# Patient Record
Sex: FEMALE | Race: BLACK OR AFRICAN AMERICAN | NOT HISPANIC OR LATINO
[De-identification: names, ages, dates, MRNs, and addresses within clinical notes are randomized per-mention and may not be internally consistent; named-entity substitution may affect disease eponyms.]

---

## 2018-01-09 ENCOUNTER — RESULT REVIEW (OUTPATIENT)
Age: 58
End: 2018-01-09

## 2018-06-26 ENCOUNTER — INPATIENT (INPATIENT)
Facility: HOSPITAL | Age: 58
LOS: 1 days | Discharge: HOME | DRG: 392 | End: 2018-06-28
Attending: INTERNAL MEDICINE | Admitting: INTERNAL MEDICINE
Payer: COMMERCIAL

## 2018-06-26 VITALS
DIASTOLIC BLOOD PRESSURE: 67 MMHG | RESPIRATION RATE: 20 BRPM | TEMPERATURE: 98 F | OXYGEN SATURATION: 100 % | SYSTOLIC BLOOD PRESSURE: 130 MMHG | HEART RATE: 78 BPM

## 2018-06-26 DIAGNOSIS — R13.10 DYSPHAGIA, UNSPECIFIED: ICD-10-CM

## 2018-06-26 LAB
ALBUMIN SERPL ELPH-MCNC: 4.9 G/DL — SIGNIFICANT CHANGE UP (ref 3.3–5)
ALP SERPL-CCNC: 58 U/L — SIGNIFICANT CHANGE UP (ref 40–120)
ALT FLD-CCNC: 14 U/L — SIGNIFICANT CHANGE UP (ref 10–45)
ANION GAP SERPL CALC-SCNC: 15 MMOL/L — SIGNIFICANT CHANGE UP (ref 5–17)
AST SERPL-CCNC: 12 U/L — SIGNIFICANT CHANGE UP (ref 10–40)
BASOPHILS # BLD AUTO: 0 K/UL — SIGNIFICANT CHANGE UP (ref 0–0.2)
BASOPHILS NFR BLD AUTO: 0.6 % — SIGNIFICANT CHANGE UP (ref 0–2)
BILIRUB SERPL-MCNC: 0.6 MG/DL — SIGNIFICANT CHANGE UP (ref 0.2–1.2)
BUN SERPL-MCNC: 11 MG/DL — SIGNIFICANT CHANGE UP (ref 7–23)
CALCIUM SERPL-MCNC: 10 MG/DL — SIGNIFICANT CHANGE UP (ref 8.4–10.5)
CHLORIDE SERPL-SCNC: 99 MMOL/L — SIGNIFICANT CHANGE UP (ref 96–108)
CO2 SERPL-SCNC: 26 MMOL/L — SIGNIFICANT CHANGE UP (ref 22–31)
CREAT SERPL-MCNC: 0.76 MG/DL — SIGNIFICANT CHANGE UP (ref 0.5–1.3)
EOSINOPHIL # BLD AUTO: 0 K/UL — SIGNIFICANT CHANGE UP (ref 0–0.5)
EOSINOPHIL NFR BLD AUTO: 0.5 % — SIGNIFICANT CHANGE UP (ref 0–6)
GLUCOSE SERPL-MCNC: 91 MG/DL — SIGNIFICANT CHANGE UP (ref 70–99)
HCT VFR BLD CALC: 44.9 % — SIGNIFICANT CHANGE UP (ref 34.5–45)
HGB BLD-MCNC: 15.6 G/DL — HIGH (ref 11.5–15.5)
LYMPHOCYTES # BLD AUTO: 1.6 K/UL — SIGNIFICANT CHANGE UP (ref 1–3.3)
LYMPHOCYTES # BLD AUTO: 27.9 % — SIGNIFICANT CHANGE UP (ref 13–44)
MCHC RBC-ENTMCNC: 31.2 PG — SIGNIFICANT CHANGE UP (ref 27–34)
MCHC RBC-ENTMCNC: 34.7 GM/DL — SIGNIFICANT CHANGE UP (ref 32–36)
MCV RBC AUTO: 90 FL — SIGNIFICANT CHANGE UP (ref 80–100)
MONOCYTES # BLD AUTO: 0.4 K/UL — SIGNIFICANT CHANGE UP (ref 0–0.9)
MONOCYTES NFR BLD AUTO: 6.2 % — SIGNIFICANT CHANGE UP (ref 2–14)
NEUTROPHILS # BLD AUTO: 3.8 K/UL — SIGNIFICANT CHANGE UP (ref 1.8–7.4)
NEUTROPHILS NFR BLD AUTO: 64.9 % — SIGNIFICANT CHANGE UP (ref 43–77)
PLATELET # BLD AUTO: 283 K/UL — SIGNIFICANT CHANGE UP (ref 150–400)
POTASSIUM SERPL-MCNC: 4.1 MMOL/L — SIGNIFICANT CHANGE UP (ref 3.5–5.3)
POTASSIUM SERPL-SCNC: 4.1 MMOL/L — SIGNIFICANT CHANGE UP (ref 3.5–5.3)
PROT SERPL-MCNC: 8 G/DL — SIGNIFICANT CHANGE UP (ref 6–8.3)
RBC # BLD: 4.99 M/UL — SIGNIFICANT CHANGE UP (ref 3.8–5.2)
RBC # FLD: 12 % — SIGNIFICANT CHANGE UP (ref 10.3–14.5)
SODIUM SERPL-SCNC: 140 MMOL/L — SIGNIFICANT CHANGE UP (ref 135–145)
WBC # BLD: 5.8 K/UL — SIGNIFICANT CHANGE UP (ref 3.8–10.5)
WBC # FLD AUTO: 5.8 K/UL — SIGNIFICANT CHANGE UP (ref 3.8–10.5)

## 2018-06-26 PROCEDURE — 99285 EMERGENCY DEPT VISIT HI MDM: CPT

## 2018-06-26 PROCEDURE — 74177 CT ABD & PELVIS W/CONTRAST: CPT | Mod: 26

## 2018-06-26 RX ORDER — SODIUM CHLORIDE 9 MG/ML
1000 INJECTION INTRAMUSCULAR; INTRAVENOUS; SUBCUTANEOUS ONCE
Qty: 0 | Refills: 0 | Status: COMPLETED | OUTPATIENT
Start: 2018-06-26 | End: 2018-06-26

## 2018-06-26 RX ORDER — PANTOPRAZOLE SODIUM 20 MG/1
40 TABLET, DELAYED RELEASE ORAL
Qty: 0 | Refills: 0 | Status: DISCONTINUED | OUTPATIENT
Start: 2018-06-26 | End: 2018-06-28

## 2018-06-26 RX ORDER — AMLODIPINE BESYLATE 2.5 MG/1
5 TABLET ORAL DAILY
Qty: 0 | Refills: 0 | Status: DISCONTINUED | OUTPATIENT
Start: 2018-06-26 | End: 2018-06-28

## 2018-06-26 RX ORDER — FAMOTIDINE 10 MG/ML
20 INJECTION INTRAVENOUS ONCE
Qty: 0 | Refills: 0 | Status: COMPLETED | OUTPATIENT
Start: 2018-06-26 | End: 2018-06-26

## 2018-06-26 RX ORDER — LATANOPROST 0.05 MG/ML
1 SOLUTION/ DROPS OPHTHALMIC; TOPICAL AT BEDTIME
Qty: 0 | Refills: 0 | Status: DISCONTINUED | OUTPATIENT
Start: 2018-06-26 | End: 2018-06-28

## 2018-06-26 RX ORDER — INFLUENZA VIRUS VACCINE 15; 15; 15; 15 UG/.5ML; UG/.5ML; UG/.5ML; UG/.5ML
0.5 SUSPENSION INTRAMUSCULAR ONCE
Qty: 0 | Refills: 0 | Status: COMPLETED | OUTPATIENT
Start: 2018-06-26 | End: 2018-06-26

## 2018-06-26 RX ORDER — VALSARTAN 80 MG/1
160 TABLET ORAL DAILY
Qty: 0 | Refills: 0 | Status: DISCONTINUED | OUTPATIENT
Start: 2018-06-26 | End: 2018-06-28

## 2018-06-26 RX ORDER — SODIUM CHLORIDE 9 MG/ML
1000 INJECTION INTRAMUSCULAR; INTRAVENOUS; SUBCUTANEOUS
Qty: 0 | Refills: 0 | Status: DISCONTINUED | OUTPATIENT
Start: 2018-06-26 | End: 2018-06-28

## 2018-06-26 RX ORDER — HEPARIN SODIUM 5000 [USP'U]/ML
5000 INJECTION INTRAVENOUS; SUBCUTANEOUS EVERY 12 HOURS
Qty: 0 | Refills: 0 | Status: DISCONTINUED | OUTPATIENT
Start: 2018-06-26 | End: 2018-06-28

## 2018-06-26 RX ADMIN — LATANOPROST 1 DROP(S): 0.05 SOLUTION/ DROPS OPHTHALMIC; TOPICAL at 23:46

## 2018-06-26 RX ADMIN — SODIUM CHLORIDE 60 MILLILITER(S): 9 INJECTION INTRAMUSCULAR; INTRAVENOUS; SUBCUTANEOUS at 23:47

## 2018-06-26 RX ADMIN — SODIUM CHLORIDE 1000 MILLILITER(S): 9 INJECTION INTRAMUSCULAR; INTRAVENOUS; SUBCUTANEOUS at 15:03

## 2018-06-26 RX ADMIN — FAMOTIDINE 20 MILLIGRAM(S): 10 INJECTION INTRAVENOUS at 15:03

## 2018-06-26 NOTE — ED PROVIDER NOTE - MEDICAL DECISION MAKING DETAILS
ATTG: : abd / epigastric pain concern for stenosis / GERD / obstruction / will check labs, check US, check lipase, check CT a/p, ivf, pain medication, re eval for dispo

## 2018-06-26 NOTE — CONSULT NOTE ADULT - ASSESSMENT
57 year old female with PMH GERD, HTN Glaucoma presents to ER with 3-4 days history of dysphagia (solids and liquids) and episodes of near syncope - appears clinically dehydrated      PLAN  -follow up ER labs  -Admit to Medicine (discussed with PMD office, admit to Dr Ngo)  -IV fluid, clears PO as tolerated for now (no carbonated beverages)  -Zofran prn  -IV PPI  -esophagram r/o peptic stricture  -possible EGD +/- dilation pending results of esophagram      Discussed with pt and family at bedside  Discussed with ER team    Thank you for the courtesy of this consult.  Shane Shahid PA-C    [facility]  [phone] 57 year old female with PMH GERD, HTN Glaucoma presents to ER with 3-4 days history of dysphagia (solids and liquids) and episodes of near syncope - appears clinically dehydrated    PLAN  -Follow up ER labs  -Admit to Medicine (discussed with PMD office, admit to Dr Ngo)  -IV fluid, clears PO as tolerated for now (no carbonated beverages)  -Zofran prn  -IV PPI  -Esophagram r/o peptic stricture  -Possible EGD +/- dilation pending results of esophagram    Discussed with pt and family at bedside  Discussed with ER team    Thank you for the courtesy of this consult.  Shane Shahid PA-C    [facility]  [phone]

## 2018-06-26 NOTE — ED PROVIDER NOTE - ATTENDING CONTRIBUTION TO CARE
56 y/o f with pmhx HTN, GERD, Esophagitis, Gastritis, fibroids s/p fibroidectomy presents for pain in middle of chest which starts after eating. started on Friday and then got worse on sat. Last BM was on Sat. no vomiting. no diarrhea. able to tolerate small amounts of food. no fever. no chills. no cough. no URI symptoms. patient was seen earlier at the GI MD office and sent to ED for further evaluation. states she started feeling weak on Friday after work. did not take any meds for this. pain is desribed as sharp, non radiating.   Gen.  jovanna cute distress  HEENT:  PERRL EOMI   Lungs:  ctab/l  CVS: S1S2   Abd;  soft, no guarding no distention, + tend to palp llq and epigastric area. no referred tend.  Ext: no edema  Neuro: aaox3 no focal deficits.  MSK: 5/5 x 4 ext

## 2018-06-26 NOTE — CONSULT NOTE ADULT - SUBJECTIVE AND OBJECTIVE BOX
Patient is a 57y old  Female who presents with a chief complaint of dysphagia    HPI:  56 y/o female presents to ED from our office c/o difficulty swallowing and epigastric pain/dyspepsia. Patient reports decreased ability to swallow and decreased PO intake. Patient reports since Saturday she has had difficulty eating and has had several episodes of near syncope. Patient reports nausea, dizziness. Patient reports long history of reflux; she only takes prn PPI for GERD, last EGD 2016.  Patient denies SOB, CP, fever/chills, recent illness/travel, falls/LOC.    She reports was able to eat normally on Friday and developed acute dysphagia with sensation of food feeling "stuck" in mid chest area on Saturday and associated nausea.  Also reports difficulty with PO liquids - had been able to take PO Indonesian and popsicles yesterday. tried PO ginger  ale and felt lightheaded and had more dyspepsia with nausea.  She is able to manage secretions there is no vomiting.  small firm BM yesterday.    no recent NSAIDs or steroids    PMD [facility]  Cardiologist Stiven - recent angio this year at [facility], no intervention    PAST MEDICAL & SURGICAL HISTORY:  Hypertension  Glaucoma  No significant past surgical history      Allergies  No Known Allergies      MEDICATIONS  (STANDING):    MEDICATIONS  (PRN):      Social History:    Denies tobacco or ETOH use    Family History   IBD (  ) Yes   (X  ) No  GI Malignancy (  )  Yes    ( X ) No        Advanced Directives: ( X    ) None    (      ) DNR    (     ) DNI    (     ) Health Care Proxy:     Review of Systems:    General:  No wt loss, fevers, chills, night sweats,   CV:  No pain, palpitations, +near syncope  Resp:  No dyspnea, cough, tachypnea, wheezing  GI:  see HPI  :  No pain, bleeding, incontinence, nocturia  Muscle:  No pain, weakness  Neuro:  No focal weakness, tingling, memory problems +near syncope  Psych:  No fatigue, insomnia, mood problems, depression  Endocrine:  No polyuria, polydypsia, cold/heat intolerance  Heme:  No petechiae, ecchymosis, easy bruisability  Skin:  No rash, tattoos, scars, edema      Vital Signs Last 24 Hrs  T(C): 36.7 (26 Jun 2018 13:24), Max: 36.7 (26 Jun 2018 13:24)  T(F): 98.1 (26 Jun 2018 13:24), Max: 98.1 (26 Jun 2018 13:24)  HR: 78 (26 Jun 2018 13:24) (78 - 78)  BP: 130/67 (26 Jun 2018 13:24) (130/67 - 130/67)  BP(mean): --  RR: 20 (26 Jun 2018 13:24) (20 - 20)  SpO2: 100% (26 Jun 2018 13:24) (100% - 100%)    PHYSICAL EXAM:    Constitutional: NAD, well-developed AA female, mother at bedside. Not drooling, no stridor. conversant and appears comfortable  Skin: decreased turgor, anicteric  Neck: No LAD, supple  no retractions, trachea midline  Respiratory: CTA b/l  Cardiovascular: S1 and S2, RRR, no Murmur  Gastrointestinal: BS+, soft, NT/ND, neg HSM,  Extremities: No peripheral edema, neg clubbing, cyanosis  Vascular: 2+ peripheral pulses  Neurological: A/O x 3, no focal deficits  Psychiatric: Normal mood, normal affect      LABS:                  RADIOLOGY & ADDITIONAL TESTS: Patient is a 57y old  Female who presents with a chief complaint of dysphagia    HPI:  56 y/o female presents to ED from our office c/o difficulty swallowing and epigastric pain/dyspepsia. Patient reports decreased ability to swallow and decreased PO intake. Patient reports since Saturday she has had difficulty eating and has had several episodes of near syncope. Patient reports nausea, dizziness. Patient reports long history of reflux; she only takes prn PPI for GERD, last EGD 2016.  Patient denies SOB, CP, fever/chills, recent illness/travel, falls/LOC.    She reports was able to eat normally on Friday and developed acute dysphagia with sensation of food feeling "stuck" in mid chest area on Saturday and associated nausea.  Also reports difficulty with PO liquids - had been able to take PO Luxembourgish and popsicles yesterday. tried PO ginger  ale and felt lightheaded and had more dyspepsia with nausea.  She is able to manage secretions there is no vomiting.  small firm BM yesterday.    no recent NSAIDs or steroids    PMD [facility]  Cardiologist [place] - recent angio this year at [facility], no intervention    PAST MEDICAL & SURGICAL HISTORY:  Hypertension  Glaucoma  No significant past surgical history    Allergies  No Known Allergies    MEDICATIONS  (STANDING):    MEDICATIONS  (PRN):    Social History:  Denies tobacco or ETOH use    Family History   IBD (  ) Yes   (X  ) No  GI Malignancy (  )  Yes    ( X ) No    Advanced Directives: ( X    ) None    (      ) DNR    (     ) DNI    (     ) Health Care Proxy:     Review of Systems:  General:  No wt loss, fevers, chills, night sweats,   CV:  No pain, palpitations, +near syncope  Resp:  No dyspnea, cough, tachypnea, wheezing  GI:  see HPI  :  No pain, bleeding, incontinence, nocturia  Muscle:  No pain, weakness  Neuro:  No focal weakness, tingling, memory problems +near syncope  Psych:  No fatigue, insomnia, mood problems, depression  Endocrine:  No polyuria, polydypsia, cold/heat intolerance  Heme:  No petechiae, ecchymosis, easy bruisability  Skin:  No rash, tattoos, scars, edema    Vital Signs Last 24 Hrs  T(C): 36.7 (26 Jun 2018 13:24), Max: 36.7 (26 Jun 2018 13:24)  T(F): 98.1 (26 Jun 2018 13:24), Max: 98.1 (26 Jun 2018 13:24)  HR: 78 (26 Jun 2018 13:24) (78 - 78)  BP: 130/67 (26 Jun 2018 13:24) (130/67 - 130/67)  BP(mean): --  RR: 20 (26 Jun 2018 13:24) (20 - 20)  SpO2: 100% (26 Jun 2018 13:24) (100% - 100%)    PHYSICAL EXAM:  Constitutional: NAD, well-developed AA female, mother at bedside. Not drooling, no stridor. conversant and appears comfortable  Skin: decreased turgor, anicteric  Neck: No LAD, supple  no retractions, trachea midline  Respiratory: CTA b/l  Cardiovascular: S1 and S2, RRR, no Murmur  Gastrointestinal: BS+, soft, NT/ND, neg HSM,  Extremities: No peripheral edema, neg clubbing, cyanosis  Vascular: 2+ peripheral pulses  Neurological: A/O x 3, no focal deficits  Psychiatric: Normal mood, normal affect    LABS:    RADIOLOGY & ADDITIONAL TESTS:

## 2018-06-26 NOTE — H&P ADULT - HISTORY OF PRESENT ILLNESS
58 y/o female presents to ED from gi office  c/o difficulty swallowing and epigastric pain/dyspepsia. Patient reports decreased ability to swallow and decreased PO intake. Patient reports since Saturday she has had difficulty eating   . Patient reports nausea, dizziness. Patient reports long history of reflux; she only takes prn PPI for GERD, last EGD 2016.  Patient denies SOB, CP, fever/chills, recent illness/travel, falls/LOC.    She reports was able to eat normally on Friday and developed acute dysphagia with sensation of food feeling "stuck" in mid chest area on Saturday and associated nausea.  Also reports difficulty with PO liquids -

## 2018-06-26 NOTE — ED PROVIDER NOTE - OBJECTIVE STATEMENT
58yo female history of HTN, fibroids, GERD, esophagitis, gastritis pw difficulty swallowing from GI office. Pt. reports difficulty eating and intermittent lightheadedness. Denies cp, sob, fevers, trauma. Pt. rpeorts that she felt that "something was stuck" when eating on Saturday. Pt. able to take PO yesterday, but reports persistent sensation. GI requesting admission for workup.

## 2018-06-26 NOTE — H&P ADULT - NSHPLABSRESULTS_GEN_ALL_CORE
15.6   5.8   )-----------( 283      ( 26 Jun 2018 14:51 )             44.9       06-26    140  |  99  |  11  ----------------------------<  91  4.1   |  26  |  0.76    Ca    10.0      26 Jun 2018 14:51    TPro  8.0  /  Alb  4.9  /  TBili  0.6  /  DBili  x   /  AST  12  /  ALT  14  /  AlkPhos  58  06-26                      Lactate Trend            CAPILLARY BLOOD GLUCOSE

## 2018-06-26 NOTE — ED PROVIDER NOTE - PHYSICAL EXAMINATION
Gen.  jovanna cute distress  HEENT:  PERRL EOMI   Lungs:  ctab/l  CVS: S1S2   Abd;  soft, no guarding no distention, + tend to palp llq and epigastric area. no referred tend.  Ext: no edema  Neuro: aaox3 no focal deficits.  MSK: 5/5 x 4 ext

## 2018-06-26 NOTE — PATIENT PROFILE ADULT. - REASON FOR ADMISSION
c/o difficulty swallowing and epigastric pain/dyspepsia. Patient reports decreased ability to swallow and decreased PO intake.  unconsciousness

## 2018-06-26 NOTE — H&P ADULT - ASSESSMENT
pt w/ dysphagia/epigastric discomfort  r/o stricture  cta/p  egd as per gi  iv fluids  clear  ppis  htn con t  meds

## 2018-06-26 NOTE — ED ADULT NURSE NOTE - OBJECTIVE STATEMENT
58 y/o female presents to ED from home c/o difficulty swallowing and epigastric pain. Patient reports decreased ability to swallow and decreased PO intake. Patient reports for the past week she has had difficulty eating and has had several episodes of near syncope. Patient reports nausea, dizziness. Patient reports long history of reflux; does not take medications.  Patient denies SOB, CP, fever/chills, recent illness/travel, falls/LOC. Patient A&Ox4, breathing spontaneously, airway patent, b/l clear lungs, abdomen tender to palpation, +pulses, cap refill <2 seconds. patient resting in bed, side rails up, plan of care explained. 56 y/o female presents to ED from home c/o difficulty swallowing and epigastric pain. Patient reports decreased ability to swallow and decreased PO intake. Patient reports since Friday she has had difficulty eating and has had several episodes of near syncope. Patient reports nausea, dizziness. Patient reports long history of reflux; does not take medications.  Patient denies SOB, CP, fever/chills, recent illness/travel, falls/LOC. Patient A&Ox4, breathing spontaneously, airway patent, b/l clear lungs, abdomen tender to palpation, +pulses, cap refill <2 seconds. patient resting in bed, side rails up, plan of care explained.

## 2018-06-27 ENCOUNTER — TRANSCRIPTION ENCOUNTER (OUTPATIENT)
Age: 58
End: 2018-06-27

## 2018-06-27 PROCEDURE — 74220 X-RAY XM ESOPHAGUS 1CNTRST: CPT | Mod: 26

## 2018-06-27 RX ORDER — ATORVASTATIN CALCIUM 80 MG/1
10 TABLET, FILM COATED ORAL AT BEDTIME
Qty: 0 | Refills: 0 | Status: DISCONTINUED | OUTPATIENT
Start: 2018-06-27 | End: 2018-06-28

## 2018-06-27 RX ADMIN — PANTOPRAZOLE SODIUM 40 MILLIGRAM(S): 20 TABLET, DELAYED RELEASE ORAL at 06:44

## 2018-06-27 RX ADMIN — SODIUM CHLORIDE 60 MILLILITER(S): 9 INJECTION INTRAMUSCULAR; INTRAVENOUS; SUBCUTANEOUS at 21:02

## 2018-06-27 RX ADMIN — ATORVASTATIN CALCIUM 10 MILLIGRAM(S): 80 TABLET, FILM COATED ORAL at 21:06

## 2018-06-27 RX ADMIN — AMLODIPINE BESYLATE 5 MILLIGRAM(S): 2.5 TABLET ORAL at 06:44

## 2018-06-27 RX ADMIN — HEPARIN SODIUM 5000 UNIT(S): 5000 INJECTION INTRAVENOUS; SUBCUTANEOUS at 06:44

## 2018-06-27 RX ADMIN — LATANOPROST 1 DROP(S): 0.05 SOLUTION/ DROPS OPHTHALMIC; TOPICAL at 21:04

## 2018-06-27 RX ADMIN — VALSARTAN 160 MILLIGRAM(S): 80 TABLET ORAL at 07:57

## 2018-06-27 RX ADMIN — SODIUM CHLORIDE 60 MILLILITER(S): 9 INJECTION INTRAMUSCULAR; INTRAVENOUS; SUBCUTANEOUS at 06:45

## 2018-06-27 NOTE — DISCHARGE NOTE ADULT - HOSPITAL COURSE
56 y/o female presents to ED from our office c/o difficulty swallowing and epigastric pain/dyspepsia. Patient reports decreased ability to swallow and decreased PO intake. Patient reports since Saturday she has had difficulty eating and has had several episodes of near syncope. Patient reports nausea, dizziness. Patient reports long history of reflux; she only takes prn PPI for GERD, last EGD 2016. Esophagram showed Tertiary contractions of the esophagus. EGD with dilation performed .  EGD showed mild gastritis, normal duodenum. Pt can resume regular diet. Start Trial of Reglan 10 mg PO QID; 30 min AC and HS. Await pathology results. Follow-up outpatient with Gastroenerologist Dr. Nascimento in 2 weeks.   Pt feels improved but is concerned with adverse side effects of tardive dyskinesia with  Reglan. 56 y/o female presents to ED from our office c/o difficulty swallowing and epigastric pain/dyspepsia. Patient reports decreased ability to swallow and decreased PO intake. Patient reports since Saturday she has had difficulty eating and has had several episodes of near syncope. Patient reports nausea, dizziness. Patient reports long history of reflux; she only takes prn PPI for GERD, last EGD 2016. Esophagram showed Tertiary contractions of the esophagus. EGD with dilation performed .  EGD showed mild gastritis, normal duodenum. Pt can resume regular diet. Start Trial of Reglan 10 mg PO QID; 30 min AC and HS. Await pathology results. Pt feels improved but is concerned with adverse side effects of tardive dyskinesia with  Reglan. Pt will follow-up outpatient with Dr. Nascimento who will arrange for alternative medication to help with dysphagia.  Pt is cleared by gastroenterologist for discharge.

## 2018-06-27 NOTE — DISCHARGE NOTE ADULT - PATIENT PORTAL LINK FT
You can access the [facility] Patient Portal, offered by [facility], by registering with the following website: [email or web address]

## 2018-06-27 NOTE — DISCHARGE NOTE ADULT - PLAN OF CARE
Improved Upper endoscopy showed mild gastritis.   Continue with medication as prescribed by your doctor.  Follow-up with Gastroenterologist Dr. Nascimento in 2 weeks. Call for appointment. Low salt diet  Activity as tolerated.  Take all medication as prescribed.  Follow up with your medical doctor for routine blood pressure monitoring at your next visit.  Notify your doctor if you have any of the following symptoms:   Dizziness, Lightheadedness, Blurry vision, Headache, Chest pain, Shortness of breath

## 2018-06-27 NOTE — PROGRESS NOTE ADULT - SUBJECTIVE AND OBJECTIVE BOX
CHIEF COMPLAINT:Patient is a 57y old  Female who presents with a chief complaint of c/o difficulty swallowing and epigastric pain/dyspepsia. Patient reports decreased ability to swallow and decreased PO intake.  unconsciousness (26 Jun 2018 21:48)    	        PAST MEDICAL & SURGICAL HISTORY:  Hypertension  No significant past surgical history          REVIEW OF SYSTEMS:  CONSTITUTIONAL: No fever, weight loss, or fatigue  EYES: No eye pain, visual disturbances, or discharge  NECK: No pain or stiffness  RESPIRATORY: No cough, wheezing, chills or hemoptysis; No Shortness of Breath  CARDIOVASCULAR: No chest pain, palpitations, passing out, dizziness, or leg swelling  GASTROINTESTINAL:abd discomfort  GENITOURINARY: No dysuria, frequency, hematuria, or incontinence  NEUROLOGICAL: No headaches, memory loss, loss of strength, numbness, or tremors  SKIN: No itching, burning, rashes, or lesions   LYMPH Nodes: No enlarged glands  ENDOCRINE: No heat or cold intolerance; No hair loss  MUSCULOSKELETAL: No joint pain or swelling; No muscle, back, or extremity pain    Medications:  MEDICATIONS  (STANDING):  amLODIPine   Tablet 5 milliGRAM(s) Oral daily  heparin  Injectable 5000 Unit(s) SubCutaneous every 12 hours  latanoprost 0.005% Ophthalmic Solution 1 Drop(s) Both EYES at bedtime  pantoprazole    Tablet 40 milliGRAM(s) Oral before breakfast  sodium chloride 0.9%. 1000 milliLiter(s) (60 mL/Hr) IV Continuous <Continuous>  valsartan 160 milliGRAM(s) Oral daily    MEDICATIONS  (PRN):    	    PHYSICAL EXAM:  T(C): 36.7 (06-27-18 @ 03:33), Max: 36.7 (06-26-18 @ 13:24)  HR: 68 (06-27-18 @ 03:33) (53 - 78)  BP: 112/60 (06-27-18 @ 03:33) (112/60 - 130/67)  RR: 16 (06-27-18 @ 03:33) (14 - 20)  SpO2: 98% (06-27-18 @ 03:33) (98% - 100%)  Wt(kg): --  I&O's Summary      Appearance: Normal	  HEENT:   Normal oral mucosa, PERRL, EOMI	  Lymphatic: No lymphadenopathy  Cardiovascular: Normal S1 S2, No JVD, No murmurs, No edema  Respiratory: Lungs clear to auscultation	  Psychiatry: A & O x 3, Mood & affect appropriate  Gastrointestinal:  Soft, tender mid epigastric no r/  Skin: No rashes, No ecchymoses, No cyanosis	  Neurologic: Non-focal  Extremities: Normal range of motion, No clubbing, cyanosis or edema  Vascular: Peripheral pulses palpable 2+ bilaterally    TELEMETRY: 	    ECG:  	  RADIOLOGY:  OTHER: 	  	  LABS:	 	    CARDIAC MARKERS:                                15.6   5.8   )-----------( 283      ( 26 Jun 2018 14:51 )             44.9     06-26    140  |  99  |  11  ----------------------------<  91  4.1   |  26  |  0.76    Ca    10.0      26 Jun 2018 14:51    TPro  8.0  /  Alb  4.9  /  TBili  0.6  /  DBili  x   /  AST  12  /  ALT  14  /  AlkPhos  58  06-26    proBNP:   Lipid Profile:   HgA1c:   TSH:

## 2018-06-27 NOTE — DISCHARGE NOTE ADULT - MEDICATION SUMMARY - MEDICATIONS TO TAKE
I will START or STAY ON the medications listed below when I get home from the hospital:    Crestor 5 mg oral tablet  -- 1 tab(s) by mouth once a day (at bedtime)  -- Indication: For High cholesterol    Exforge 5 mg-160 mg oral tablet  -- 1 tab(s) by mouth once a day  -- Indication: For High blood pressure    latanoprost 0.005% ophthalmic solution  -- 1 drop(s) to both eyes once a day (in the evening)  -- Indication: For protect from increased intra-ocular pressure    AcipHex 20 mg oral delayed release tablet  -- 1 tab(s) by mouth once a day  -- Indication: For mild gastritis, dysphagia I will START or STAY ON the medications listed below when I get home from the hospital:    Crestor 5 mg oral tablet  -- 1 tab(s) by mouth once a day (at bedtime)  -- Indication: For High cholesterol    Exforge 5 mg-160 mg oral tablet  -- 1 tab(s) by mouth once a day  -- Indication: For High blood pressure    latanoprost 0.005% ophthalmic solution  -- 1 drop(s) to both eyes once a day (in the evening)  -- Indication: For protect from increased intra-ocular pressure    pantoprazole 40 mg oral delayed release tablet  -- 1 tab(s) by mouth once a day   -- It is very important that you take or use this exactly as directed.  Do not skip doses or discontinue unless directed by your doctor.  Obtain medical advice before taking any non-prescription drugs as some may affect the action of this medication.  Swallow whole.  Do not crush.    -- Indication: For Protect from indigestion, mild gastritis    AcipHex 20 mg oral delayed release tablet  -- 1 tab(s) by mouth once a day  -- Indication: For mild gastritis, dysphagia I will START or STAY ON the medications listed below when I get home from the hospital:    Crestor 5 mg oral tablet  -- 1 tab(s) by mouth once a day (at bedtime)  -- Indication: For High cholesterol    Exforge 5 mg-160 mg oral tablet  -- 1 tab(s) by mouth once a day  -- Indication: For High blood pressure    latanoprost 0.005% ophthalmic solution  -- 1 drop(s) to both eyes once a day (in the evening)  -- Indication: For protect from increased intra-ocular pressure    pantoprazole 40 mg oral delayed release tablet  -- 1 tab(s) by mouth once a day   -- It is very important that you take or use this exactly as directed.  Do not skip doses or discontinue unless directed by your doctor.  Obtain medical advice before taking any non-prescription drugs as some may affect the action of this medication.  Swallow whole.  Do not crush.    -- Indication: For Protect from indigestion, mild gastritis

## 2018-06-27 NOTE — DISCHARGE NOTE ADULT - ADDITIONAL INSTRUCTIONS
Follow-up with your primary care physician within 1 week.   Follow-up with Gastroenterologist,  Dr. Nascimento in 2 weeks. Call for appointment.

## 2018-06-27 NOTE — DISCHARGE NOTE ADULT - MEDICATION SUMMARY - MEDICATIONS TO STOP TAKING
I will STOP taking the medications listed below when I get home from the hospital:  None I will STOP taking the medications listed below when I get home from the hospital:    AcipHex 20 mg oral delayed release tablet  -- 1 tab(s) by mouth once a day

## 2018-06-27 NOTE — PROGRESS NOTE ADULT - SUBJECTIVE AND OBJECTIVE BOX
Patient is a 57y old  Female who presented with a chief complaint of c/o difficulty swallowing and epigastric pain/dyspepsia. Patient reports decreased ability to swallow and decreased PO intake.  unconsciousness (27 Jun 2018 09:54)      INTERVAL HPI/OVERNIGHT EVENTS:  tolerating clears  no vomiting  awaiting esophagram    MEDICATIONS  (STANDING):  amLODIPine   Tablet 5 milliGRAM(s) Oral daily  heparin  Injectable 5000 Unit(s) SubCutaneous every 12 hours  latanoprost 0.005% Ophthalmic Solution 1 Drop(s) Both EYES at bedtime  pantoprazole    Tablet 40 milliGRAM(s) Oral before breakfast  sodium chloride 0.9%. 1000 milliLiter(s) (60 mL/Hr) IV Continuous <Continuous>  valsartan 160 milliGRAM(s) Oral daily      Allergies  No Known Allergies    Review of Systems:  General:  No wt loss, fevers, chills, night sweats   CV:  No pain, palpitations  Resp:  No dyspnea, cough, tachypnea, wheezing  :  No pain, bleeding, incontinence, nocturia  Muscle:  No pain, weakness  Neuro:  No focal weakness, tingling, memory problems   Heme:  No petechiae, ecchymosis, easy bruisability  Skin:  No rash, tattoos, scars, edema    Vital Signs Last 24 Hrs  T(C): 36.7 (27 Jun 2018 03:33), Max: 36.7 (26 Jun 2018 13:24)  T(F): 98.1 (27 Jun 2018 03:33), Max: 98.1 (26 Jun 2018 13:24)  HR: 68 (27 Jun 2018 03:33) (53 - 78)  BP: 112/60 (27 Jun 2018 03:33) (112/60 - 130/67)  BP(mean): --  RR: 16 (27 Jun 2018 03:33) (14 - 20)  SpO2: 98% (27 Jun 2018 03:33) (98% - 100%)    PHYSICAL EXAM:  Constitutional: NAD, well-developed AA female Not drooling, no stridor. conversant and appears comfortable  Skin: improved turgor, anicteric  Neck: No LAD, supple  no retractions, trachea midline  Respiratory: CTA b/l  Cardiovascular: S1 and S2, RRR, no Murmur  Gastrointestinal: BS+, soft, NT/ND, neg HSM,  Extremities: No peripheral edema, neg clubbing, cyanosis  Vascular: 2+ peripheral pulses  Neurological: A/O x 3, no focal deficits  Psychiatric: Normal mood, normal affect    LABS:                        15.6   5.8   )-----------( 283      ( 26 Jun 2018 14:51 )             44.9     06-26    140  |  99  |  11  ----------------------------<  91  4.1   |  26  |  0.76    Ca    10.0      26 Jun 2018 14:51    TPro  8.0  /  Alb  4.9  /  TBili  0.6  /  DBili  x   /  AST  12  /  ALT  14  /  AlkPhos  58  06-26      RADIOLOGY & ADDITIONAL TESTS:  < from: CT Abdomen and Pelvis w/ Oral Cont and w/ IV Cont (06.26.18 @ 16:23) >  PROCEDURE:   CT of the Abdomen and Pelvis was performed with intravenous contrast.   Intravenous contrast: 90 ml Omnipaque 350. 10 ml discarded.  Oral contrast: positive contrast was administered.  Sagittal and coronal reformats were performed.    FINDINGS:    LOWER CHEST: Within normal limits.    LIVER:Within normal limits.  BILE DUCTS: Normal caliber.  GALLBLADDER: Within normal limits.  SPLEEN: Within normal limits.  PANCREAS: Within normal limits.  ADRENALS: Within normal limits.  KIDNEYS/URETERS: 3 mm left-sided nonobstructing stone.    BLADDER: Within normal limits.  REPRODUCTIVE ORGANS: The uterus is markedly enlarged measuring 14.0x 7.3   cm with multiple calcified and degenerating uterine fibroids. The adnexa   are otherwise within normal limits.    BOWEL: No bowel obstruction. Scattered colonic diverticulosis without   evidence of diverticulitis. Appendix is normal.  PERITONEUM: No ascites.  VESSELS:  Within normal limits.  RETROPERITONEUM: No lymphadenopathy.    ABDOMINAL WALL: Within normal limits.  BONES: Degenerative changes ofthe spine.    IMPRESSION:   No acute intra-abdominal pathology.    The uterus is markedly enlarged measuring 14.0x 7.3 cm with multiple   calcified and degenerating uterine fibroids.       < end of copied text > Patient is a 57y old  Female who presented with a chief complaint of c/o difficulty swallowing and epigastric pain/dyspepsia. Patient reports decreased ability to swallow and decreased PO intake.  unconsciousness (27 Jun 2018 09:54)    INTERVAL HPI/OVERNIGHT EVENTS:  tolerating clears  no vomiting  awaiting esophagram    MEDICATIONS  (STANDING):  amLODIPine   Tablet 5 milliGRAM(s) Oral daily  heparin  Injectable 5000 Unit(s) SubCutaneous every 12 hours  latanoprost 0.005% Ophthalmic Solution 1 Drop(s) Both EYES at bedtime  pantoprazole    Tablet 40 milliGRAM(s) Oral before breakfast  sodium chloride 0.9%. 1000 milliLiter(s) (60 mL/Hr) IV Continuous <Continuous>  valsartan 160 milliGRAM(s) Oral daily    Allergies  No Known Allergies    Review of Systems:  General:  No wt loss, fevers, chills, night sweats   CV:  No pain, palpitations  Resp:  No dyspnea, cough, tachypnea, wheezing  :  No pain, bleeding, incontinence, nocturia  Muscle:  No pain, weakness  Neuro:  No focal weakness, tingling, memory problems   Heme:  No petechiae, ecchymosis, easy bruisability  Skin:  No rash, tattoos, scars, edema    Vital Signs Last 24 Hrs  T(C): 36.7 (27 Jun 2018 03:33), Max: 36.7 (26 Jun 2018 13:24)  T(F): 98.1 (27 Jun 2018 03:33), Max: 98.1 (26 Jun 2018 13:24)  HR: 68 (27 Jun 2018 03:33) (53 - 78)  BP: 112/60 (27 Jun 2018 03:33) (112/60 - 130/67)  BP(mean): --  RR: 16 (27 Jun 2018 03:33) (14 - 20)  SpO2: 98% (27 Jun 2018 03:33) (98% - 100%)    PHYSICAL EXAM:  Constitutional: NAD, well-developed AA female Not drooling, no stridor. conversant and appears comfortable  Skin: improved turgor, anicteric  Neck: No LAD, supple  no retractions, trachea midline  Respiratory: CTA b/l  Cardiovascular: S1 and S2, RRR, no Murmur  Gastrointestinal: BS+, soft, NT/ND, neg HSM,  Extremities: No peripheral edema, neg clubbing, cyanosis  Vascular: 2+ peripheral pulses  Neurological: A/O x 3, no focal deficits  Psychiatric: Normal mood, normal affect    LABS:                        15.6   5.8   )-----------( 283      ( 26 Jun 2018 14:51 )             44.9     06-26    140  |  99  |  11  ----------------------------<  91  4.1   |  26  |  0.76    Ca    10.0      26 Jun 2018 14:51    TPro  8.0  /  Alb  4.9  /  TBili  0.6  /  DBili  x   /  AST  12  /  ALT  14  /  AlkPhos  58  06-26    RADIOLOGY & ADDITIONAL TESTS:  < from: CT Abdomen and Pelvis w/ Oral Cont and w/ IV Cont (06.26.18 @ 16:23) >  PROCEDURE:   CT of the Abdomen and Pelvis was performed with intravenous contrast.   Intravenous contrast: 90 ml Omnipaque 350. 10 ml discarded.  Oral contrast: positive contrast was administered.  Sagittal and coronal reformats were performed.    FINDINGS:    LOWER CHEST: Within normal limits.    LIVER:Within normal limits.  BILE DUCTS: Normal caliber.  GALLBLADDER: Within normal limits.  SPLEEN: Within normal limits.  PANCREAS: Within normal limits.  ADRENALS: Within normal limits.  KIDNEYS/URETERS: 3 mm left-sided nonobstructing stone.    BLADDER: Within normal limits.  REPRODUCTIVE ORGANS: The uterus is markedly enlarged measuring 14.0x 7.3   cm with multiple calcified and degenerating uterine fibroids. The adnexa   are otherwise within normal limits.    BOWEL: No bowel obstruction. Scattered colonic diverticulosis without   evidence of diverticulitis. Appendix is normal.  PERITONEUM: No ascites.  VESSELS:  Within normal limits.  RETROPERITONEUM: No lymphadenopathy.    ABDOMINAL WALL: Within normal limits.  BONES: Degenerative changes ofthe spine.    IMPRESSION:   No acute intra-abdominal pathology.    The uterus is markedly enlarged measuring 14.0x 7.3 cm with multiple   calcified and degenerating uterine fibroids.       < end of copied text >

## 2018-06-27 NOTE — PROGRESS NOTE ADULT - ASSESSMENT
pt w/ dysphagia/epigastric discomfort  r/o stricture  cta/p noted  esophagram today  egd as per gi  iv fluids  clears  ppis  htn con t  meds

## 2018-06-27 NOTE — DISCHARGE NOTE ADULT - CARE PLAN
Principal Discharge DX:	Difficulty swallowing  Goal:	Improved  Assessment and plan of treatment:	Upper endoscopy showed mild gastritis.   Continue with medication as prescribed by your doctor.  Follow-up with Gastroenterologist Dr. Nascimento in 2 weeks. Call for appointment.  Secondary Diagnosis:	Hypertension  Assessment and plan of treatment:	Low salt diet  Activity as tolerated.  Take all medication as prescribed.  Follow up with your medical doctor for routine blood pressure monitoring at your next visit.  Notify your doctor if you have any of the following symptoms:   Dizziness, Lightheadedness, Blurry vision, Headache, Chest pain, Shortness of breath

## 2018-06-27 NOTE — PROGRESS NOTE ADULT - ASSESSMENT
57 year old female with PMH GERD, HTN Glaucoma presents to ER with 3-4 days history of dysphagia (solids and liquids) and episodes of near syncope - appears clinically dehydrated    PLAN  -IV fluid, clears PO as tolerated for now (no carbonated beverages)  -Zofran prn  -IV PPI  -Esophagram r/o peptic stricture  -EGD +/- dilation planned tomorrow pending esophagram findings    Discussed with pt at bedside  Discussed with Medicine attending    hSane Shahid PA-C    [facility]  [phone] 57 year old female with PMH GERD, HTN Glaucoma presents to ER with 3-4 days history of dysphagia (solids and liquids) and episodes of near syncope - appears clinically dehydrated.    PLAN  -IV fluid, clears PO as tolerated for now (no carbonated beverages)  -Zofran prn  -IV PPI  -Esophagram r/o peptic stricture  -EGD +/- dilation planned tomorrow pending esophagram findings    Discussed with pt at bedside  Discussed with Medicine attending    Shane Shahid PA-C    [facility]  [phone]

## 2018-06-27 NOTE — DISCHARGE NOTE ADULT - CARE PROVIDER_API CALL
Teresita Nascimento), Gastroenterology; Internal Medicine  [address] [id number]  Phone: [phone]  Fax: [phone]

## 2018-06-28 ENCOUNTER — RESULT REVIEW (OUTPATIENT)
Age: 58
End: 2018-06-28

## 2018-06-28 VITALS
TEMPERATURE: 98 F | HEART RATE: 60 BPM | SYSTOLIC BLOOD PRESSURE: 111 MMHG | RESPIRATION RATE: 18 BRPM | OXYGEN SATURATION: 98 % | DIASTOLIC BLOOD PRESSURE: 65 MMHG

## 2018-06-28 PROCEDURE — 99285 EMERGENCY DEPT VISIT HI MDM: CPT | Mod: 25

## 2018-06-28 PROCEDURE — 88305 TISSUE EXAM BY PATHOLOGIST: CPT | Mod: 26

## 2018-06-28 PROCEDURE — 74177 CT ABD & PELVIS W/CONTRAST: CPT

## 2018-06-28 PROCEDURE — 88312 SPECIAL STAINS GROUP 1: CPT

## 2018-06-28 PROCEDURE — 80053 COMPREHEN METABOLIC PANEL: CPT

## 2018-06-28 PROCEDURE — 85027 COMPLETE CBC AUTOMATED: CPT

## 2018-06-28 PROCEDURE — 96374 THER/PROPH/DIAG INJ IV PUSH: CPT | Mod: XU

## 2018-06-28 PROCEDURE — 88305 TISSUE EXAM BY PATHOLOGIST: CPT

## 2018-06-28 PROCEDURE — 74220 X-RAY XM ESOPHAGUS 1CNTRST: CPT

## 2018-06-28 PROCEDURE — 88312 SPECIAL STAINS GROUP 1: CPT | Mod: 26

## 2018-06-28 RX ORDER — PANTOPRAZOLE SODIUM 20 MG/1
1 TABLET, DELAYED RELEASE ORAL
Qty: 30 | Refills: 0
Start: 2018-06-28 | End: 2018-07-27

## 2018-06-28 RX ORDER — RABEPRAZOLE 20 MG/1
1 TABLET, DELAYED RELEASE ORAL
Qty: 0 | Refills: 0 | COMMUNITY

## 2018-06-28 RX ADMIN — PANTOPRAZOLE SODIUM 40 MILLIGRAM(S): 20 TABLET, DELAYED RELEASE ORAL at 06:16

## 2018-06-28 RX ADMIN — AMLODIPINE BESYLATE 5 MILLIGRAM(S): 2.5 TABLET ORAL at 06:16

## 2018-06-28 RX ADMIN — VALSARTAN 160 MILLIGRAM(S): 80 TABLET ORAL at 06:16

## 2018-06-28 NOTE — CHART NOTE - NSCHARTNOTEFT_GEN_A_CORE
D/w GI Dr. Daniel  re: pt does not tolerate generic Aciphex and prefers pantoprazole. Per Dr. Daniel: okay to discharge on pantoprazole.   Mary Shepherd [facility] [id number]

## 2018-06-28 NOTE — PROGRESS NOTE ADULT - ASSESSMENT
pt w/ dysphagia/epigastric discomfort  cta/p noted  esophagram noted  s/p EGD w/dilation  tolerating PO  diet per GI  ppis  htn con t  meds  d/c planning if cleared by GI

## 2018-06-28 NOTE — PROGRESS NOTE ADULT - ATTENDING COMMENTS
Teresita Nascimento MD, FACP, FACG, AGAF  [facility]  [phone]
Teresita Nascimento MD, FACP, FACG, AGAF  [facility]  [phone]

## 2018-06-28 NOTE — PROGRESS NOTE ADULT - SUBJECTIVE AND OBJECTIVE BOX
Pre-Endoscopy Evaluation      Referring Physician:  Dr. Alexy Ngo                                 Procedure:  EGD    Indication for Procedure: Dysphagia    Pertinent History: 57 year old female with PMH GERD, HTN, Glaucoma with dysphagia     Sedation by Anesthesia [X]    PAST MEDICAL & SURGICAL HISTORY:  Hypertension  No significant past surgical history      PMH of Gastroparesis [ ]  Gastric Surgery [ ]  Gastric Outlet Obstruction [ ]    Allergies:    No Known Allergies    Intolerances:    Latex allergy: [ ] yes [X] no    Medications:MEDICATIONS  (STANDING):  amLODIPine   Tablet 5 milliGRAM(s) Oral daily  atorvastatin 10 milliGRAM(s) Oral at bedtime  heparin  Injectable 5000 Unit(s) SubCutaneous every 12 hours  latanoprost 0.005% Ophthalmic Solution 1 Drop(s) Both EYES at bedtime  pantoprazole    Tablet 40 milliGRAM(s) Oral before breakfast  sodium chloride 0.9%. 1000 milliLiter(s) (60 mL/Hr) IV Continuous <Continuous>  valsartan 160 milliGRAM(s) Oral daily    MEDICATIONS  (PRN):      Smoking: [ ] yes  [X] no    AICD/PPM: [ ] yes   [X] no    Pertinent lab data:                        15.6   5.8   )-----------( 283      ( 26 Jun 2018 14:51 )             44.9     06-26    140  |  99  |  11  ----------------------------<  91  4.1   |  26  |  0.76    Ca    10.0      26 Jun 2018 14:51    TPro  8.0  /  Alb  4.9  /  TBili  0.6  /  DBili  x   /  AST  12  /  ALT  14  /  AlkPhos  58  06-26      Physical Examination:  Daily   Vital Signs Last 24 Hrs  T(C): 37.2 (28 Jun 2018 08:37), Max: 37.2 (28 Jun 2018 08:21)  T(F): 99 (28 Jun 2018 08:37), Max: 99 (28 Jun 2018 08:21)  HR: 62 (28 Jun 2018 08:37) (53 - 62)  BP: 120/69 (28 Jun 2018 08:37) (111/62 - 132/73)  BP(mean): --  RR: 18 (28 Jun 2018 08:37) (17 - 18)  SpO2: 98% (28 Jun 2018 08:37) (98% - 99%)    Drug Dosing Weight  Height (cm): 152.4 (26 Jun 2018 20:25)  Weight (kg): 62.2 (26 Jun 2018 20:25)  BMI (kg/m2): 26.8 (26 Jun 2018 20:25)  BSA (m2): 1.59 (26 Jun 2018 20:25)    Constitutional: NAD    Neck:  No JVD    Respiratory: CTAB/L    Cardiovascular: S1 and S2    Gastrointestinal: BS+, soft, NT/ND    Extremities: No peripheral edema    Neurological: A/O x 3    : No Romero    Skin: No rashes    Comments:    ASA Class: I [ ]  II [X]  III [ ]  IV [ ]    The patient is a suitable candidate for the planned procedure unless box checked [ ]  No, explain: Pre-Endoscopy Evaluation    Referring Physician:  Dr. Alexy Ngo                                 Procedure:  EGD    Indication for Procedure: Dysphagia    Pertinent History: 57 year old female with PMH GERD, HTN, Glaucoma with dysphagia     Sedation by Anesthesia [X]    PAST MEDICAL & SURGICAL HISTORY:  Hypertension  No significant past surgical history    PMH of Gastroparesis [ ]  Gastric Surgery [ ]  Gastric Outlet Obstruction [ ]    Allergies:  No Known Allergies    Latex allergy: [ ] yes [X] no    Medications:MEDICATIONS  (STANDING):  amLODIPine   Tablet 5 milliGRAM(s) Oral daily  atorvastatin 10 milliGRAM(s) Oral at bedtime  heparin  Injectable 5000 Unit(s) SubCutaneous every 12 hours  latanoprost 0.005% Ophthalmic Solution 1 Drop(s) Both EYES at bedtime  pantoprazole    Tablet 40 milliGRAM(s) Oral before breakfast  sodium chloride 0.9%. 1000 milliLiter(s) (60 mL/Hr) IV Continuous <Continuous>  valsartan 160 milliGRAM(s) Oral daily    MEDICATIONS  (PRN):    Smoking: [ ] yes  [X] no    AICD/PPM: [ ] yes   [X] no    Pertinent lab data:                        15.6   5.8   )-----------( 283      ( 26 Jun 2018 14:51 )             44.9     06-26    140  |  99  |  11  ----------------------------<  91  4.1   |  26  |  0.76    Ca    10.0      26 Jun 2018 14:51    TPro  8.0  /  Alb  4.9  /  TBili  0.6  /  DBili  x   /  AST  12  /  ALT  14  /  AlkPhos  58  06-26    Physical Examination:  Daily   Vital Signs Last 24 Hrs  T(C): 37.2 (28 Jun 2018 08:37), Max: 37.2 (28 Jun 2018 08:21)  T(F): 99 (28 Jun 2018 08:37), Max: 99 (28 Jun 2018 08:21)  HR: 62 (28 Jun 2018 08:37) (53 - 62)  BP: 120/69 (28 Jun 2018 08:37) (111/62 - 132/73)  BP(mean): --  RR: 18 (28 Jun 2018 08:37) (17 - 18)  SpO2: 98% (28 Jun 2018 08:37) (98% - 99%)    Drug Dosing Weight  Height (cm): 152.4 (26 Jun 2018 20:25)  Weight (kg): 62.2 (26 Jun 2018 20:25)  BMI (kg/m2): 26.8 (26 Jun 2018 20:25)  BSA (m2): 1.59 (26 Jun 2018 20:25)    Constitutional: NAD    Neck:  No JVD    Respiratory: CTAB/L    Cardiovascular: S1 and S2    Gastrointestinal: BS+, soft, NT/ND    Extremities: No peripheral edema    Neurological: A/O x 3    : No Romero    Skin: No rashes    Comments:    ASA Class: I [ ]  II [X]  III [ ]  IV [ ]

## 2018-06-28 NOTE — PROGRESS NOTE ADULT - SUBJECTIVE AND OBJECTIVE BOX
CHIEF COMPLAINT:Patient is a 57y old  Female who presents with a chief complaint of c/o difficulty swallowing and epigastric pain/dyspepsia. Patient reports decreased ability to swallow and decreased PO intake.  unconsciousness (26 Jun 2018 21:48)    now s/p EGD w/dilation      PAST MEDICAL & SURGICAL HISTORY:  Hypertension  No significant past surgical history          REVIEW OF SYSTEMS:  CONSTITUTIONAL: No fever, weight loss, or fatigue  EYES: No eye pain, visual disturbances, or discharge  NECK: No pain or stiffness  RESPIRATORY: No cough, wheezing, chills or hemoptysis; No Shortness of Breath  CARDIOVASCULAR: No chest pain, palpitations, passing out, dizziness, or leg swelling  GASTROINTESTINAL:abd discomfort  GENITOURINARY: No dysuria, frequency, hematuria, or incontinence  NEUROLOGICAL: No headaches, memory loss, loss of strength, numbness, or tremors  SKIN: No itching, burning, rashes, or lesions   LYMPH Nodes: No enlarged glands  ENDOCRINE: No heat or cold intolerance; No hair loss  MUSCULOSKELETAL: No joint pain or swelling; No muscle, back, or extremity pain    Medications:  MEDICATIONS  (STANDING):  amLODIPine   Tablet 5 milliGRAM(s) Oral daily  heparin  Injectable 5000 Unit(s) SubCutaneous every 12 hours  latanoprost 0.005% Ophthalmic Solution 1 Drop(s) Both EYES at bedtime  pantoprazole    Tablet 40 milliGRAM(s) Oral before breakfast  sodium chloride 0.9%. 1000 milliLiter(s) (60 mL/Hr) IV Continuous <Continuous>  valsartan 160 milliGRAM(s) Oral daily    MEDICATIONS  (PRN):    	    PHYSICAL EXAM:  T(C): 36.7 (06-27-18 @ 03:33), Max: 36.7 (06-26-18 @ 13:24)  HR: 68 (06-27-18 @ 03:33) (53 - 78)  BP: 112/60 (06-27-18 @ 03:33) (112/60 - 130/67)  RR: 16 (06-27-18 @ 03:33) (14 - 20)  SpO2: 98% (06-27-18 @ 03:33) (98% - 100%)  Wt(kg): --  I&O's Summary      Appearance: Normal	  HEENT:   Normal oral mucosa, PERRL, EOMI	  Lymphatic: No lymphadenopathy  Cardiovascular: Normal S1 S2, No JVD, No murmurs, No edema  Respiratory: Lungs clear to auscultation	  Psychiatry: A & O x 3, Mood & affect appropriate  Gastrointestinal:  Soft, tender mid epigastric no r/  Skin: No rashes, No ecchymoses, No cyanosis	  Neurologic: Non-focal  Extremities: Normal range of motion, No clubbing, cyanosis or edema  Vascular: Peripheral pulses palpable 2+ bilaterally    TELEMETRY: 	    ECG:  	  RADIOLOGY:  OTHER: 	  	  LABS:	 	    CARDIAC MARKERS:                                15.6   5.8   )-----------( 283      ( 26 Jun 2018 14:51 )             44.9     06-26    140  |  99  |  11  ----------------------------<  91  4.1   |  26  |  0.76    Ca    10.0      26 Jun 2018 14:51    TPro  8.0  /  Alb  4.9  /  TBili  0.6  /  DBili  x   /  AST  12  /  ALT  14  /  AlkPhos  58  06-26    proBNP:   Lipid Profile:   HgA1c:   TSH:

## 2018-06-29 LAB — SURGICAL PATHOLOGY STUDY: SIGNIFICANT CHANGE UP

## 2019-03-02 ENCOUNTER — EMERGENCY (EMERGENCY)
Facility: HOSPITAL | Age: 59
LOS: 1 days | Discharge: HOME | End: 2019-03-02
Attending: EMERGENCY MEDICINE | Admitting: EMERGENCY MEDICINE
Payer: COMMERCIAL

## 2019-03-02 VITALS
OXYGEN SATURATION: 98 % | WEIGHT: 132.94 LBS | DIASTOLIC BLOOD PRESSURE: 89 MMHG | HEART RATE: 77 BPM | RESPIRATION RATE: 18 BRPM | TEMPERATURE: 99 F | SYSTOLIC BLOOD PRESSURE: 143 MMHG | HEIGHT: 59 IN

## 2019-03-02 LAB
ALBUMIN SERPL ELPH-MCNC: 4.6 G/DL — SIGNIFICANT CHANGE UP (ref 3.3–5)
ALP SERPL-CCNC: 67 U/L — SIGNIFICANT CHANGE UP (ref 40–120)
ALT FLD-CCNC: 11 U/L — SIGNIFICANT CHANGE UP (ref 4–33)
ANION GAP SERPL CALC-SCNC: 15 MMO/L — HIGH (ref 7–14)
AST SERPL-CCNC: 15 U/L — SIGNIFICANT CHANGE UP (ref 4–32)
BILIRUB SERPL-MCNC: 0.3 MG/DL — SIGNIFICANT CHANGE UP (ref 0.2–1.2)
BUN SERPL-MCNC: 12 MG/DL — SIGNIFICANT CHANGE UP (ref 7–23)
CALCIUM SERPL-MCNC: 9.8 MG/DL — SIGNIFICANT CHANGE UP (ref 8.4–10.5)
CHLORIDE SERPL-SCNC: 106 MMOL/L — SIGNIFICANT CHANGE UP (ref 98–107)
CO2 SERPL-SCNC: 22 MMOL/L — SIGNIFICANT CHANGE UP (ref 22–31)
CREAT SERPL-MCNC: 0.89 MG/DL — SIGNIFICANT CHANGE UP (ref 0.5–1.3)
D DIMER BLD IA.RAPID-MCNC: 174 NG/ML — SIGNIFICANT CHANGE UP
GLUCOSE SERPL-MCNC: 97 MG/DL — SIGNIFICANT CHANGE UP (ref 70–99)
HCT VFR BLD CALC: 43.7 % — SIGNIFICANT CHANGE UP (ref 34.5–45)
HGB BLD-MCNC: 14.2 G/DL — SIGNIFICANT CHANGE UP (ref 11.5–15.5)
MCHC RBC-ENTMCNC: 29.3 PG — SIGNIFICANT CHANGE UP (ref 27–34)
MCHC RBC-ENTMCNC: 32.5 % — SIGNIFICANT CHANGE UP (ref 32–36)
MCV RBC AUTO: 90.3 FL — SIGNIFICANT CHANGE UP (ref 80–100)
NRBC # FLD: 0 K/UL — LOW (ref 25–125)
PLATELET # BLD AUTO: 270 K/UL — SIGNIFICANT CHANGE UP (ref 150–400)
PMV BLD: 10.7 FL — SIGNIFICANT CHANGE UP (ref 7–13)
POTASSIUM SERPL-MCNC: 3.9 MMOL/L — SIGNIFICANT CHANGE UP (ref 3.5–5.3)
POTASSIUM SERPL-SCNC: 3.9 MMOL/L — SIGNIFICANT CHANGE UP (ref 3.5–5.3)
PROT SERPL-MCNC: 7.8 G/DL — SIGNIFICANT CHANGE UP (ref 6–8.3)
RBC # BLD: 4.84 M/UL — SIGNIFICANT CHANGE UP (ref 3.8–5.2)
RBC # FLD: 12.8 % — SIGNIFICANT CHANGE UP (ref 10.3–14.5)
SODIUM SERPL-SCNC: 143 MMOL/L — SIGNIFICANT CHANGE UP (ref 135–145)
TROPONIN T, HIGH SENSITIVITY: < 6 NG/L — SIGNIFICANT CHANGE UP (ref ?–14)
WBC # BLD: 7.29 K/UL — SIGNIFICANT CHANGE UP (ref 3.8–10.5)
WBC # FLD AUTO: 7.29 K/UL — SIGNIFICANT CHANGE UP (ref 3.8–10.5)

## 2019-03-02 PROCEDURE — 71046 X-RAY EXAM CHEST 2 VIEWS: CPT | Mod: 26

## 2019-03-02 PROCEDURE — 99284 EMERGENCY DEPT VISIT MOD MDM: CPT

## 2019-03-02 RX ORDER — CYCLOBENZAPRINE HYDROCHLORIDE 10 MG/1
1 TABLET, FILM COATED ORAL
Qty: 21 | Refills: 0
Start: 2019-03-02 | End: 2019-03-08

## 2019-03-02 RX ORDER — IBUPROFEN 200 MG
1 TABLET ORAL
Qty: 30 | Refills: 0
Start: 2019-03-02 | End: 2019-03-11

## 2019-03-02 RX ORDER — KETOROLAC TROMETHAMINE 30 MG/ML
15 SYRINGE (ML) INJECTION ONCE
Qty: 0 | Refills: 0 | Status: DISCONTINUED | OUTPATIENT
Start: 2019-03-02 | End: 2019-03-02

## 2019-03-02 RX ADMIN — Medication 15 MILLIGRAM(S): at 15:16

## 2019-03-02 NOTE — ED PROVIDER NOTE - OBJECTIVE STATEMENT
57 y/o female with PMHx of HLD, GERD, HTN presents to the ED c/o right upper back pain by right scapular. She reports she got up this morning perfectly but after taking her BP medicine, she started experiencing back pain that radiated towards the front. She feels tingling sometimes. Pain upon breathing and motion. No other acute complaints present at time of eval.

## 2019-03-02 NOTE — ED PROVIDER NOTE - CROS ED CONS ALL NEG
Patient had sleep study last night at [facility]  Patient is upset about what happened with sleep study:    Patient had PSG done in March 2017 - scanned into Epic  Patient with RUBENS with AHI of 10.8  Went to Dr. Annamaria Franklin to discuss surgery - states Dr. Anna Marie Kinney negative...

## 2019-03-07 ENCOUNTER — EMERGENCY (EMERGENCY)
Facility: HOSPITAL | Age: 59
LOS: 1 days | Discharge: HOME | End: 2019-03-07
Attending: EMERGENCY MEDICINE | Admitting: EMERGENCY MEDICINE
Payer: COMMERCIAL

## 2019-03-07 VITALS
RESPIRATION RATE: 16 BRPM | DIASTOLIC BLOOD PRESSURE: 86 MMHG | OXYGEN SATURATION: 98 % | TEMPERATURE: 99 F | SYSTOLIC BLOOD PRESSURE: 144 MMHG | HEART RATE: 90 BPM

## 2019-03-07 VITALS
OXYGEN SATURATION: 100 % | DIASTOLIC BLOOD PRESSURE: 90 MMHG | SYSTOLIC BLOOD PRESSURE: 158 MMHG | RESPIRATION RATE: 16 BRPM | HEART RATE: 70 BPM

## 2019-03-07 PROCEDURE — 99283 EMERGENCY DEPT VISIT LOW MDM: CPT

## 2019-03-07 NOTE — ED ADULT TRIAGE NOTE - CHIEF COMPLAINT QUOTE
Pt evaluated on 3/2/19 for back pain and numbness and tingling to left side.   Presents today with complaints that it's getting worse, "I'm losing my balance" since yesterday.  Endorses a pre-syncopal episode yesterday.  As per pt "It felt like I was going to past out, I sat down", pt endorses that she was able to hear what was happening around her.  As per pt my mouth feels like its down.  No facial droop noted. Grasp equal bilaterally, no neuro deficits noted at this time. Pt evaluated on 3/2/19 for back pain and numbness and tingling to left side.   Presents today with complaints that it's getting worse, "I'm losing my balance" since yesterday.  Endorses a pre-syncopal episode yesterday.  As per pt "It felt like I was going to past out, I sat down", pt endorses that she was able to hear what was happening around her.  As per pt my mouth feels like its down.  No facial droop noted. Grasp equal bilaterally, no neuro deficits noted at this time.  FIT evaluation, no stroke

## 2019-03-07 NOTE — ED PROVIDER NOTE - NEUROLOGICAL, MLM
Alert and oriented, no focal deficits, no motor or sensory deficits. Cranial nerves normal, no weakness or decrease sensation on upper and lower extremities.

## 2019-03-07 NOTE — ED PROVIDER NOTE - NSFOLLOWUPINSTRUCTIONS_ED_ALL_ED_FT
1) Follow up with your doctor in 2 - 3 days. Dr. Cruz's office will call you.   2) Return to the ER immediately for new or worsening symptoms including shortness of breath, vomiting or other issues.   3) continue to take your home medications as prescribed.

## 2019-03-07 NOTE — ED ADULT NURSE NOTE - CHIEF COMPLAINT QUOTE
Pt evaluated on 3/2/19 for back pain and numbness and tingling to left side.   Presents today with complaints that it's getting worse, "I'm losing my balance" since yesterday.  Endorses a pre-syncopal episode yesterday.  As per pt "It felt like I was going to past out, I sat down", pt endorses that she was able to hear what was happening around her.  As per pt my mouth feels like its down.  No facial droop noted. Grasp equal bilaterally, no neuro deficits noted at this time.  FIT evaluation, no stroke

## 2019-03-07 NOTE — ED PROVIDER NOTE - CLINICAL SUMMARY MEDICAL DECISION MAKING FREE TEXT BOX
female with vague tingling. normal neuro exam, very well appearing. discussed with Dr. Cruz who will see patient in office in 2-3 days and follow up with outpatient MRI.

## 2019-03-07 NOTE — ED ADULT NURSE NOTE - OBJECTIVE STATEMENT
Patient received to room 1, A&Ox4, respirations even and unlabored, skin warm and dry good for color, no neuro deficits observed, speaks in clear and full sentences, ambulates with slow and steady gait. Patient reports weakness and numbness to left side accompanied by lightheadedness. Denies chest pain, SOB, LOC. As per MD Vega, hold labs at time. Hx HTN. Will continue to monitor patient.

## 2019-03-07 NOTE — ED ADULT NURSE NOTE - NSIMPLEMENTINTERV_GEN_ALL_ED
Implemented All Universal Safety Interventions:  [place] to call system. Call bell, personal items and telephone within reach. Instruct patient to call for assistance. Room bathroom lighting operational. Non-slip footwear when patient is off stretcher. Physically safe environment: no spills, clutter or unnecessary equipment. Stretcher in lowest position, wheels locked, appropriate side rails in place.

## 2019-03-07 NOTE — ED PROVIDER NOTE - OBJECTIVE STATEMENT
58yoF hx of htn pw intermittent tingling and lightheadedness. Tingling is left sided, intermittent, lasting minutes, then resolving, associated with subjective weakness, and some feelings of  off balance. This then improves. Patient has had similar symptoms for months. Denies chest pain, sob, nausea, vomiting, diarrhea, fevers, chills, abd pain, headaches, visual changes, slurred speech, falls or other issues.     Discussed with Dr. Cruz (neurology outpatient) who has worked patient up for this including MRI, carotid doppler with no concerning findings. Requesting follow up for repeat MRI as outpatient.

## 2019-03-12 ENCOUNTER — RESULT REVIEW (OUTPATIENT)
Age: 59
End: 2019-03-12

## 2019-11-02 ENCOUNTER — EMERGENCY (EMERGENCY)
Facility: HOSPITAL | Age: 59
LOS: 1 days | Discharge: HOME | End: 2019-11-02
Attending: EMERGENCY MEDICINE | Admitting: STUDENT IN AN ORGANIZED HEALTH CARE EDUCATION/TRAINING PROGRAM
Payer: COMMERCIAL

## 2019-11-02 VITALS
TEMPERATURE: 98 F | DIASTOLIC BLOOD PRESSURE: 74 MMHG | SYSTOLIC BLOOD PRESSURE: 147 MMHG | RESPIRATION RATE: 16 BRPM | HEART RATE: 62 BPM | OXYGEN SATURATION: 100 %

## 2019-11-02 LAB
ALBUMIN SERPL ELPH-MCNC: 4.7 G/DL — SIGNIFICANT CHANGE UP (ref 3.3–5)
ALP SERPL-CCNC: 59 U/L — SIGNIFICANT CHANGE UP (ref 40–120)
ALT FLD-CCNC: 10 U/L — SIGNIFICANT CHANGE UP (ref 4–33)
ANION GAP SERPL CALC-SCNC: 12 MMO/L — SIGNIFICANT CHANGE UP (ref 7–14)
APPEARANCE UR: CLEAR — SIGNIFICANT CHANGE UP
APTT BLD: 28.8 SEC — SIGNIFICANT CHANGE UP (ref 27.5–36.3)
AST SERPL-CCNC: 14 U/L — SIGNIFICANT CHANGE UP (ref 4–32)
BASOPHILS # BLD AUTO: 0.05 K/UL — SIGNIFICANT CHANGE UP (ref 0–0.2)
BASOPHILS NFR BLD AUTO: 0.9 % — SIGNIFICANT CHANGE UP (ref 0–2)
BILIRUB SERPL-MCNC: 0.4 MG/DL — SIGNIFICANT CHANGE UP (ref 0.2–1.2)
BILIRUB UR-MCNC: NEGATIVE — SIGNIFICANT CHANGE UP
BLD GP AB SCN SERPL QL: NEGATIVE — SIGNIFICANT CHANGE UP
BLOOD UR QL VISUAL: NEGATIVE — SIGNIFICANT CHANGE UP
BUN SERPL-MCNC: 9 MG/DL — SIGNIFICANT CHANGE UP (ref 7–23)
CALCIUM SERPL-MCNC: 9.5 MG/DL — SIGNIFICANT CHANGE UP (ref 8.4–10.5)
CHLORIDE SERPL-SCNC: 103 MMOL/L — SIGNIFICANT CHANGE UP (ref 98–107)
CK MB BLD-MCNC: 1.97 NG/ML — SIGNIFICANT CHANGE UP (ref 1–4.7)
CK SERPL-CCNC: 141 U/L — SIGNIFICANT CHANGE UP (ref 25–170)
CO2 SERPL-SCNC: 25 MMOL/L — SIGNIFICANT CHANGE UP (ref 22–31)
COLOR SPEC: COLORLESS — SIGNIFICANT CHANGE UP
CREAT SERPL-MCNC: 0.77 MG/DL — SIGNIFICANT CHANGE UP (ref 0.5–1.3)
EOSINOPHIL # BLD AUTO: 0.02 K/UL — SIGNIFICANT CHANGE UP (ref 0–0.5)
EOSINOPHIL NFR BLD AUTO: 0.4 % — SIGNIFICANT CHANGE UP (ref 0–6)
GLUCOSE SERPL-MCNC: 122 MG/DL — HIGH (ref 70–99)
GLUCOSE UR-MCNC: NEGATIVE — SIGNIFICANT CHANGE UP
HBA1C BLD-MCNC: 6 % — HIGH (ref 4–5.6)
HCT VFR BLD CALC: 42.1 % — SIGNIFICANT CHANGE UP (ref 34.5–45)
HGB BLD-MCNC: 14.3 G/DL — SIGNIFICANT CHANGE UP (ref 11.5–15.5)
IMM GRANULOCYTES NFR BLD AUTO: 0.2 % — SIGNIFICANT CHANGE UP (ref 0–1.5)
INR BLD: 1.02 — SIGNIFICANT CHANGE UP (ref 0.88–1.17)
KETONES UR-MCNC: NEGATIVE — SIGNIFICANT CHANGE UP
LEUKOCYTE ESTERASE UR-ACNC: NEGATIVE — SIGNIFICANT CHANGE UP
LYMPHOCYTES # BLD AUTO: 1.1 K/UL — SIGNIFICANT CHANGE UP (ref 1–3.3)
LYMPHOCYTES # BLD AUTO: 20.7 % — SIGNIFICANT CHANGE UP (ref 13–44)
MCHC RBC-ENTMCNC: 29.5 PG — SIGNIFICANT CHANGE UP (ref 27–34)
MCHC RBC-ENTMCNC: 34 % — SIGNIFICANT CHANGE UP (ref 32–36)
MCV RBC AUTO: 87 FL — SIGNIFICANT CHANGE UP (ref 80–100)
MONOCYTES # BLD AUTO: 0.4 K/UL — SIGNIFICANT CHANGE UP (ref 0–0.9)
MONOCYTES NFR BLD AUTO: 7.5 % — SIGNIFICANT CHANGE UP (ref 2–14)
NEUTROPHILS # BLD AUTO: 3.74 K/UL — SIGNIFICANT CHANGE UP (ref 1.8–7.4)
NEUTROPHILS NFR BLD AUTO: 70.3 % — SIGNIFICANT CHANGE UP (ref 43–77)
NITRITE UR-MCNC: NEGATIVE — SIGNIFICANT CHANGE UP
NRBC # FLD: 0 K/UL — SIGNIFICANT CHANGE UP (ref 0–0)
PH UR: 7 — SIGNIFICANT CHANGE UP (ref 5–8)
PLATELET # BLD AUTO: 237 K/UL — SIGNIFICANT CHANGE UP (ref 150–400)
PMV BLD: 9.4 FL — SIGNIFICANT CHANGE UP (ref 7–13)
POTASSIUM SERPL-MCNC: 3.4 MMOL/L — LOW (ref 3.5–5.3)
POTASSIUM SERPL-SCNC: 3.4 MMOL/L — LOW (ref 3.5–5.3)
PROT SERPL-MCNC: 7.9 G/DL — SIGNIFICANT CHANGE UP (ref 6–8.3)
PROT UR-MCNC: NEGATIVE — SIGNIFICANT CHANGE UP
PROTHROM AB SERPL-ACNC: 11.3 SEC — SIGNIFICANT CHANGE UP (ref 9.8–13.1)
RBC # BLD: 4.84 M/UL — SIGNIFICANT CHANGE UP (ref 3.8–5.2)
RBC # FLD: 13.1 % — SIGNIFICANT CHANGE UP (ref 10.3–14.5)
RH IG SCN BLD-IMP: POSITIVE — SIGNIFICANT CHANGE UP
SODIUM SERPL-SCNC: 140 MMOL/L — SIGNIFICANT CHANGE UP (ref 135–145)
SP GR SPEC: 1.01 — SIGNIFICANT CHANGE UP (ref 1–1.04)
TROPONIN T, HIGH SENSITIVITY: < 6 NG/L — SIGNIFICANT CHANGE UP (ref ?–14)
UROBILINOGEN FLD QL: NORMAL — SIGNIFICANT CHANGE UP
WBC # BLD: 5.32 K/UL — SIGNIFICANT CHANGE UP (ref 3.8–10.5)
WBC # FLD AUTO: 5.32 K/UL — SIGNIFICANT CHANGE UP (ref 3.8–10.5)

## 2019-11-02 PROCEDURE — 70496 CT ANGIOGRAPHY HEAD: CPT | Mod: 26

## 2019-11-02 PROCEDURE — 70552 MRI BRAIN STEM W/DYE: CPT | Mod: 26

## 2019-11-02 PROCEDURE — 70450 CT HEAD/BRAIN W/O DYE: CPT | Mod: 26,59

## 2019-11-02 PROCEDURE — 70545 MR ANGIOGRAPHY HEAD W/DYE: CPT | Mod: 26,59

## 2019-11-02 PROCEDURE — 70498 CT ANGIOGRAPHY NECK: CPT | Mod: 26

## 2019-11-02 PROCEDURE — 99281 EMR DPT VST MAYX REQ PHY/QHP: CPT

## 2019-11-02 PROCEDURE — 70548 MR ANGIOGRAPHY NECK W/DYE: CPT | Mod: 26

## 2019-11-02 PROCEDURE — 95819 EEG AWAKE AND ASLEEP: CPT | Mod: 26

## 2019-11-02 PROCEDURE — 99218: CPT | Mod: 25

## 2019-11-02 RX ORDER — ASPIRIN/CALCIUM CARB/MAGNESIUM 324 MG
81 TABLET ORAL ONCE
Refills: 0 | Status: COMPLETED | OUTPATIENT
Start: 2019-11-02 | End: 2019-11-02

## 2019-11-02 RX ORDER — AMLODIPINE BESYLATE 2.5 MG/1
5 TABLET ORAL DAILY
Refills: 0 | Status: DISCONTINUED | OUTPATIENT
Start: 2019-11-02 | End: 2019-11-09

## 2019-11-02 RX ORDER — CLOPIDOGREL BISULFATE 75 MG/1
75 TABLET, FILM COATED ORAL EVERY 24 HOURS
Refills: 0 | Status: DISCONTINUED | OUTPATIENT
Start: 2019-11-02 | End: 2019-11-09

## 2019-11-02 RX ORDER — ATORVASTATIN CALCIUM 80 MG/1
40 TABLET, FILM COATED ORAL AT BEDTIME
Refills: 0 | Status: DISCONTINUED | OUTPATIENT
Start: 2019-11-02 | End: 2019-11-09

## 2019-11-02 RX ORDER — ASPIRIN/CALCIUM CARB/MAGNESIUM 324 MG
81 TABLET ORAL EVERY 24 HOURS
Refills: 0 | Status: DISCONTINUED | OUTPATIENT
Start: 2019-11-02 | End: 2019-11-09

## 2019-11-02 RX ORDER — SODIUM CHLORIDE 9 MG/ML
1000 INJECTION INTRAMUSCULAR; INTRAVENOUS; SUBCUTANEOUS ONCE
Refills: 0 | Status: COMPLETED | OUTPATIENT
Start: 2019-11-02 | End: 2019-11-02

## 2019-11-02 RX ORDER — CLOPIDOGREL BISULFATE 75 MG/1
75 TABLET, FILM COATED ORAL ONCE
Refills: 0 | Status: COMPLETED | OUTPATIENT
Start: 2019-11-02 | End: 2019-11-02

## 2019-11-02 RX ORDER — PANTOPRAZOLE SODIUM 20 MG/1
40 TABLET, DELAYED RELEASE ORAL
Refills: 0 | Status: DISCONTINUED | OUTPATIENT
Start: 2019-11-02 | End: 2019-11-09

## 2019-11-02 RX ADMIN — SODIUM CHLORIDE 1000 MILLILITER(S): 9 INJECTION INTRAMUSCULAR; INTRAVENOUS; SUBCUTANEOUS at 12:02

## 2019-11-02 RX ADMIN — ATORVASTATIN CALCIUM 40 MILLIGRAM(S): 80 TABLET, FILM COATED ORAL at 21:29

## 2019-11-02 RX ADMIN — AMLODIPINE BESYLATE 5 MILLIGRAM(S): 2.5 TABLET ORAL at 17:01

## 2019-11-02 RX ADMIN — Medication 81 MILLIGRAM(S): at 13:16

## 2019-11-02 RX ADMIN — CLOPIDOGREL BISULFATE 75 MILLIGRAM(S): 75 TABLET, FILM COATED ORAL at 13:16

## 2019-11-02 NOTE — ED CDU PROVIDER INITIAL DAY NOTE - MEDICAL DECISION MAKING DETAILS
59F hx htn p/w episode of LUE and LLE weakness today that lasted 3 hours and then resolved, with similar episode earlier this week. NIHSS 0 at this time and pt not given tpa. given simlar presentation in march with negative workup, unlikely to be stroke/tia, but will evaluate by keeping on cardiac monitor and obtaining MRI. alternative diagnoses include complex migraine or atypical seizure. per neurology will also obtain spot EEG. neuro to follow as well.

## 2019-11-02 NOTE — ED PROVIDER NOTE - CLINICAL SUMMARY MEDICAL DECISION MAKING FREE TEXT BOX
58 y/o F hx of HTN, HLD presenting with subjective left sided hemiplegia now resolved on exam.  CODE Stroke called prior to primary team assessment. CTH negative. Neurology recommending CTA head/neck  DDx includes neuropathy, complex migraine. Less likely TIA/CVA  Plan for CTA, EKG, IVF, chemistry and reassessment. Likely f/u for outpatient MRI if all results normal.

## 2019-11-02 NOTE — ED ADULT NURSE NOTE - NSIMPLEMENTINTERV_GEN_ALL_ED
Implemented All Universal Safety Interventions:  [place] to call system. Call bell, personal items and telephone within reach. Instruct patient to call for assistance. Room bathroom lighting operational. Non-slip footwear when patient is off stretcher. Physically safe environment: no spills, clutter or unnecessary equipment. Stretcher in lowest position, wheels locked, appropriate side rails in place.

## 2019-11-02 NOTE — ED PROVIDER NOTE - OBJECTIVE STATEMENT
60 y/o F hx of HTN presenting with left sided weakness. 60 y/o F hx of HTN, HLD presenting with left sided weakness.    Patient noted sx around 8am while walking out of grocery store lasting 20-25 min with residual sx upon arrival to ED. Noted light-headedness and nausea but no vomiting, diaphoresis, chest pain, palpitations or syncope. Has been in usual state of health; no fevers/chills, dysuria, diarrhea, cough or abdominal pain. She noted similar sx earlier this week and was seen in ED in 03/2019 for left sided paresthesias/weakness that was followed up with outpatient neurologist with negative MRI and carotid duplex which resulted negative. Since March and this week, patient denies recurrent sx. Treated for HZV earlier this week with no residual post-herpetic pain.

## 2019-11-02 NOTE — CONSULT NOTE ADULT - ATTENDING COMMENTS
I performed a history and physical examination of the patient and discussed the management of the patient with the resident. I reviewed the resident's note and agree with the documented findings and plan of care with the following additions/exceptions.    DOS 11/3/19    she reports monday she felt numb in left arm and leg, felt fatigued and drained, lasted 20 min then resolved. no headache.  same thing happened yesterday. she fele left arm and leg numb, front and back, both started at same time not progressing from 1 limb to next. no involvement of face or chest or trunk. no headache. no visual changes. happened while she was walking. she felt weak but could walk, wasn't falling. she stood for a while to rest and then sat in her car. when resolved after 20 min was able to drive home. no cp or sob with it.  currently feels normal but with little headache    on exam alert, fluent, no paraphasic errors, normal comprehension  eomi, face symmetric  no drift  full strength  no dysmetria  no sensory change now    mri didn't show any stroke  incidental L A2 aneurysm  eeg was normal    unclear etiology. no thalamic stroke which could have been fitting with sx. no hx migraine and no headache with this to tihnk it was migraine. less likely seizure.  could be TIA.  resident d/w vascular attending Dr. Libman overnight who recommended treating as a TIA with asa and plavix for 3 weeks followed by asa, and to f/u with [facility] neuro outpatient. Patient has a neurologist Dr. Dwight Cruz and prefers to f/u with him (sees him for vertigo).

## 2019-11-02 NOTE — CONSULT NOTE ADULT - ASSESSMENT
58 y/o F hx of HTN, HLD presenting with left sided weakness. note symptom onset at 8AM with severe symptoms for 20-30 minutes, mild sxs upon arrival at ED which quickly stopped. No cp no sob no syncope no abd pain no n/v no f/c no ha.noted similar symptoms earlier this week that lasted 20 min and resolved, and similar issues in march for which she had a workup including carotid doppler and brain mri which were wnl at that time, in ED pt was activated as a code stroke. ct and cta were negative for acute pathology.  MRS 0 NIHSS 0.      Impression:  Likely TIA in right MCA syndrome pattern, moderate risk per ABCD2 score of 4  - Left A2 segment anterior cerebral aneuyrsm, 2mm depth and 2mm neck, but please advise patient she needs to have yearly imaging follow up to monitor it.  - EEG  - MRI / MRA  - aspirin and plavix  - follow up with Tamia Meredith NP Neurology [phone] within one week

## 2019-11-02 NOTE — ED PROVIDER NOTE - ATTENDING CONTRIBUTION TO CARE
I, Jennifer Cabot, MD, have performed a history and physical exam of the patient and discussed their management with the resident. I reviewed the resident's note and agree with the documented findings and plan of care. My medical decision making and observations are found above.    Cabot: 59F with PMH of HTN and months of intermittent L sided tingling and weakness, worked up by Dr. Cruz (outpatient neurologist) with MRI and carotid US (both normal), here with recurrent LUE and LLE weakness and tingling and lightheadedness since 8am today.  No vision changes, speech changes, CP, SOB, HA.  No trauma.  On exam at this time, HDS, NAD, MMM, eyes clear, lungs CTAB, heart sounds normal, abd soft, NT, ND, no CVAT, LEs without edema, wwp, skin normal temperature and color, neuro: AAOx3, PERRLA, EOMIB, CN 2-12 intact, 5/5 strength, SILT, no drift.  Code stroke called.  CT head normal.  DDx includes complex migraine, very unlikely stroke.  Pending CTA head.  Will also check basic lytes, trop, EKG, CXR. I, Jennifer Cabot, MD, have performed a history and physical exam of the patient and discussed their management with the resident. I reviewed the resident's note and agree with the documented findings and plan of care. My medical decision making and observations are found above.    Cabot: 59F with PMH of HTN and months of intermittent L sided tingling and weakness, worked up by Dr. Cruz (outpatient neurologist) with MRI and carotid US (both normal), here with recurrent LUE and LLE weakness and tingling and lightheadedness since 8am today.  No vision changes, speech changes, CP, SOB, HA.  No trauma.  Patient has not had an EEG performed.  On exam at this time, HDS, NAD, MMM, eyes clear, lungs CTAB, heart sounds normal, abd soft, NT, ND, no CVAT, LEs without edema, wwp, skin normal temperature and color, neuro: AAOx3, PERRLA, EOMIB, CN 2-12 intact, 5/5 strength, SILT, no drift.  Code stroke called.  CT head normal.  DDx includes complex migraine, seizure, very unlikely stroke.  Pending CTA head.  Will also check basic lytes, trop, EKG, CXR.

## 2019-11-02 NOTE — ED CDU PROVIDER INITIAL DAY NOTE - ATTENDING CONTRIBUTION TO CARE
Emely Lombardi M.D: note written by myself in its entirety  unless otherwise stated and reflects my medical decision making

## 2019-11-02 NOTE — ED ADULT NURSE NOTE - CHIEF COMPLAINT QUOTE
Pt c/o L sided weakness , " heaviness to her head "nausea and "feeling faint "at 8am, Denies dizziness, blurry vision, chest pain, sob.  No slurred speech or facial droop noted.  MD Lombardi called for eval. Stroke Code called

## 2019-11-02 NOTE — ED ADULT NURSE NOTE - OBJECTIVE STATEMENT
patient is 59y female, A&Ox3, ambulatory at baseline, complaining of weakness, nausea, and dizziness at around 8am that lasted 25 minutes. At this time denies pain/dizziness/headache/Chest pain/Shortness of breath. Equal bilateral strength in all four extremities. No facial drooping. Initiated 20gauge IV in Left antecubital. Labs drawn and sent. Normal sinus rhythm on cardiac monitor. Patient's mother is at bedside. Bed in lowest locked position. Call bell in reach. Will continue to monitor.

## 2019-11-02 NOTE — ED ADULT NURSE REASSESSMENT NOTE - NS ED NURSE REASSESS COMMENT FT1
covering primary RN for break pt is in bed A and OX  3 in NAD resting comfortably, dysphagia screening performed, medicated as pe order, pending CT studies, PERRLA extremities are strong and equal, no change in pt's baseline mental status.

## 2019-11-02 NOTE — ED PROVIDER NOTE - PROGRESS NOTE DETAILS
Cabot: Neuro recommends repeat MRI and also a spot EEG. Emely Lombardi M.D: FIT eval- called to eval 59F for possible code stroke. pt with hx htn and hld p/w left arm and leg weakness that started acutely at 8am with near syncope and subjective sensory deficit. notes similar episode earlier this week that lasted 20 minutes and resolved. on exam cn 2-12 intact, 4+/5 strength in LUE and LLE as compared to right, toehrwise rest of neuro exam nonfocal. given within window for possible TPA, code stroke called and pt taken to CT. sign out given to Dr Cabot and neurology. Cabot: Neuro recommends repeat MRI/MRA and also a spot EEG. Cabot: Neuro also recommends ASA 81mg and plavix 75mg daily.

## 2019-11-02 NOTE — ED ADULT TRIAGE NOTE - CHIEF COMPLAINT QUOTE
Pt c/o L sided weakness , " heaviness to her head "nausea and "feeling faint "at 8am, Denies dizziness, blurry vision, chest pain, sob.  No slurred speech or facial droop noted Pt c/o L sided weakness , " heaviness to her head "nausea and "feeling faint "at 8am, Denies dizziness, blurry vision, chest pain, sob.  No slurred speech or facial droop noted.  MD Lombardi called for abdiel Pt c/o L sided weakness , " heaviness to her head "nausea and "feeling faint "at 8am, Denies dizziness, blurry vision, chest pain, sob.  No slurred speech or facial droop noted.  MD Lombardi called for eval. Stroke Code called

## 2019-11-02 NOTE — ED CDU PROVIDER INITIAL DAY NOTE - PHYSICAL EXAMINATION
Emely Lombardi M.D.:   patient awake alert NAD .   LUNGS CTAB no wheeze no crackle.   CARD RRR no m/r/g.    Abdomen soft NT ND no rebound no guarding no CVA tenderness.   EXT WWP no edema no calf tenderness CV 2+DP/PT bilaterally.   neuro A&Ox3 cn2-12 intact, 4+/5 strength in LUE and LLE as compared to RUE and RLE, sensory intact throughout extremities.    skin warm and dry no rash  HEENT: moist mucous membranes, PERRL, EOMI

## 2019-11-02 NOTE — CONSULT NOTE ADULT - SUBJECTIVE AND OBJECTIVE BOX
HPI:    58 y/o F hx of HTN, HLD presenting with left sided weakness. note symptom onset at 8AM with severe symptoms for 20-30 minutes, mild sxs upon arrival at ED which quickly stopped. No cp no sob no syncope no abd pain no n/v no f/c no ha.noted similar symptoms earlier this week that lasted 20 min and resolved, and similar issues in march for which she had a workup including carotid doppler and brain mri which were wnl at that time, in ED pt was activated as a code stroke. ct and cta were negative for acute pathology.  MRS 0 NIHSS 0.    REVIEW OF SYSTEMS:  Constitutional: No fever, chills, fatigue, weakness.  Eyes: No eye pain, visual disturbances, or discharge.  ENT:  No difficulty hearing, tinnitus, vertigo. No sinus or throat pain.  Neck: No pain or stiffness.  Respiratory: No cough, dyspnea, wheezing.  Cardiovascular: No chest pain, palpitations.  Gastrointestinal: No abdominal pain. No nausea, vomiting, diarrhea, or constipation.   Genitourinary: No dysuria, frequency, hematuria or incontinence.  Neurological: No headaches, lightheadedness, vertigo, numbness or tremors.  Psychiatric: No depression, anxiety, mood swings, or difficulty sleeping.  Musculoskeletal: No joint pain or swelling. No muscle, back, or extremity pain.  Skin: No itching, burning, rashes or lesions.   Lymph Nodes: No enlarged glands  Endocrine: No heat or cold intolerance, no hair loss.  Allergy and Immunologic: No hives or eczema.    PAST MEDICAL & SURGICAL HISTORY:  Hyperlipemia  Hypertension  No significant past surgical history    FAMILY HISTORY:  No pertinent family history in first degree relatives    No history of dementia, strokes, or seizures     SOCIAL HISTORY:   No history of tobacco or alcohol use     MEDICATIONS (HOME):  Home Medications:  Crestor 5 mg oral tablet: 1 tab(s) orally once a day (at bedtime) (2018 18:07)  Exforge 5 mg-160 mg oral tablet: 1 tab(s) orally once a day (2018 18:07)  latanoprost 0.005% ophthalmic solution: 1 drop(s) to both eyes once a day (in the evening) (2018 18:07)    MEDICATIONS  (STANDING):  amLODIPine   Tablet 5 milliGRAM(s) Oral daily  atorvastatin 40 milliGRAM(s) Oral at bedtime  pantoprazole    Tablet 40 milliGRAM(s) Oral before breakfast    MEDICATIONS  (PRN):    ALLERGIES/INTOLERANCES:  Allergies  No Known Allergies    Intolerances    VITALS & EXAMINATION:  Vital Signs Last 24 Hrs  T(C): 36.8 (2019 16:00), Max: 36.8 (2019 16:00)  T(F): 98.3 (2019 16:00), Max: 98.3 (2019 16:00)  HR: 54 (2019 16:00) (54 - 62)  BP: 154/78 (2019 16:00) (147/74 - 154/78)  BP(mean): --  RR: 16 (2019 16:00) (16 - 16)  SpO2: 100% (2019 16:00) (100% - 100%)    PHYSICAL EXAMINATION  Constitutional: No apparent distress.  Head: Normocephalic & atraumatic.  Extremities: No edema, clubbing, cyanosis  Skin: No rashes    Neurological:  Mental Status: Awake, alert, oriented to person, place, situation, time. Normal affect. Follows all commands.    Language: Speech is clear, fluent with good repetition & comprehension (able to name objects___)    Cranial Nerves: PERRLA (R = 3mm, L = 3mm). VFF. EOMI. No nystagmus, no diplopia. V1-3 intact to light touch. No facial asymmetry. Full eye closure strength B/L. Hearing grossly normal B/L. Symmetric palate elevation. Gag reflex deferred. Head turning & shoulder shrug intact B/L. Tongue midline.    Fundoscopic: Pale w/ sharp discs margins No vascular changes.      Motor: Normal muscle bulk & tone. No noticeable tremor or seizure. No pronator drift.              Deltoid	Biceps	Triceps	Wrist	Finger ABd	   R	5	5	5	5	5		5 	  L	5	5	5	5	5		5    	H-Flex	H-Ext	H-ABd	H-ADd	K-Flex	K-Ext	D-Flex	P-Flex  R	5	5	5	5	5	5	5	5 	   L	5	5	5	5	5	5	5	5	     Sensation: Intact to LT/PP/Temp/Vibration/Position B/L throughout. No extinction.    Reflexes:              Biceps(C5)       BR(C6)     Triceps(C7)               Patellar(L4)    Achilles(S1)    Plantar Resp  R	2+	          2+             2+		        2+		    1+		Down   L	2+	          2+             2+		        2+		    1+		Down     Coordination: Intact rapid-alt movements. No dysmetria on finger to nose or heel to shin.    Gait: Normal Romberg. No postural instability. Normal stance and tandem gait.     LABORATORY:  CBC                       14.3   5.32  )-----------( 237      ( 2019 11:30 )             42.1     Chem 11    140  |  103  |  9   ----------------------------<  122<H>  3.4<L>   |  25  |  0.77    Ca    9.5      2019 11:30    TPro  7.9  /  Alb  4.7  /  TBili  0.4  /  DBili  x   /  AST  14  /  ALT  10  /  AlkPhos  59  11-02    LFTs LIVER FUNCTIONS - ( 2019 11:30 )  Alb: 4.7 g/dL / Pro: 7.9 g/dL / ALK PHOS: 59 u/L / ALT: 10 u/L / AST: 14 u/L / GGT: x           Coagulopathy PT/INR - ( 2019 11:30 )   PT: 11.3 SEC;   INR: 1.02          PTT - ( 2019 11:30 )  PTT:28.8 SEC  Lipid Panel   A1c   Cardiac enzymes CARDIAC MARKERS ( 2019 11:30 )  x     / x     / 141 u/L / 1.97 ng/mL / x          U/A Urinalysis Basic - ( 2019 12:00 )    Color: COLORLESS / Appearance: CLEAR / S.007 / pH: 7.0  Gluc: NEGATIVE / Ketone: NEGATIVE  / Bili: NEGATIVE / Urobili: NORMAL   Blood: NEGATIVE / Protein: NEGATIVE / Nitrite: NEGATIVE   Leuk Esterase: NEGATIVE / RBC: x / WBC x   Sq Epi: x / Non Sq Epi: x / Bacteria: x    < from: CT Angio Neck w/ IV Cont (19 @ 13:48) >  IMPRESSION:     CTA BRAIN: Left A2 segment anterior cerebral (pericallosal) aneurysm   measuring 2 mm in depth and 2 mm of the neck. Patent intracranial   circulation. No flow-limiting stenosis or occlusion.     CTA NECK: Patent cervical vasculature. No flow limiting stenosis or   occlusion.     All measurements are performed using standard NASCET criteria.    Dr. Jeffrey discussed these findings with Dr. Jason Snow on 2019   1:58 PM with read back.    < end of copied text > HPI:    60 y/o F hx of HTN, HLD presenting with left sided weakness. note symptom onset at 8AM with severe symptoms for 20-30 minutes, mild sxs upon arrival at ED which quickly stopped. No cp no sob no syncope no abd pain no n/v no f/c no ha.noted similar symptoms earlier this week that lasted 20 min and resolved, and similar issues in march for which she had a workup including carotid doppler and brain mri which were wnl at that time, in ED pt was activated as a code stroke. ct and cta were negative for acute pathology.  MRS 0 NIHSS 0.    REVIEW OF SYSTEMS:  Constitutional: No fever, chills, fatigue, weakness.  Eyes: No eye pain, visual disturbances, or discharge.  ENT:  No difficulty hearing, tinnitus, vertigo. No sinus or throat pain.  Neck: No pain or stiffness.  Respiratory: No cough, dyspnea, wheezing.  Cardiovascular: No chest pain, palpitations.  Gastrointestinal: No abdominal pain. No nausea, vomiting, diarrhea, or constipation.   Genitourinary: No dysuria, frequency, hematuria or incontinence.  Neurological: No headaches, lightheadedness, vertigo, numbness or tremors.  Psychiatric: No depression, anxiety, mood swings, or difficulty sleeping.  Musculoskeletal: No joint pain or swelling. No muscle, back, or extremity pain.  Skin: No itching, burning, rashes or lesions.   Lymph Nodes: No enlarged glands  Endocrine: No heat or cold intolerance, no hair loss.  Allergy and Immunologic: No hives or eczema.    PAST MEDICAL & SURGICAL HISTORY:  Hyperlipemia  Hypertension  No significant past surgical history    FAMILY HISTORY:  No pertinent family history in first degree relatives    No history of dementia, strokes, or seizures     SOCIAL HISTORY:   No history of tobacco or alcohol use     MEDICATIONS (HOME):  Home Medications:  Crestor 5 mg oral tablet: 1 tab(s) orally once a day (at bedtime) (2018 18:07)  Exforge 5 mg-160 mg oral tablet: 1 tab(s) orally once a day (2018 18:07)  latanoprost 0.005% ophthalmic solution: 1 drop(s) to both eyes once a day (in the evening) (2018 18:07)    MEDICATIONS  (STANDING):  amLODIPine   Tablet 5 milliGRAM(s) Oral daily  atorvastatin 40 milliGRAM(s) Oral at bedtime  pantoprazole    Tablet 40 milliGRAM(s) Oral before breakfast    MEDICATIONS  (PRN):    ALLERGIES/INTOLERANCES:  Allergies  No Known Allergies    Intolerances    VITALS & EXAMINATION:  Vital Signs Last 24 Hrs  T(C): 36.8 (2019 16:00), Max: 36.8 (2019 16:00)  T(F): 98.3 (2019 16:00), Max: 98.3 (2019 16:00)  HR: 54 (2019 16:00) (54 - 62)  BP: 154/78 (2019 16:00) (147/74 - 154/78)  BP(mean): --  RR: 16 (2019 16:00) (16 - 16)  SpO2: 100% (2019 16:00) (100% - 100%)    PHYSICAL EXAMINATION  Constitutional: No apparent distress.  Head: Normocephalic & atraumatic.  Extremities: No edema, clubbing, cyanosis  Skin: No rashes    Neurological:    Neurological Exam    Mental Status:  alert and oriented x3, fluent speech, following commands, repetition and naming intact    Cranial Nerves: PERRL, EOMI without nystagmus, visual fields intact, no facial droop, pallate and uvula midline, no dysarthria, head turn strength normal, shoulder shrug strength normal.    Motor:   Tone:   normal               Strength:  Upper extremity                          Delt       Bicep    Tricep                                                  R         5/5        5/5        5/5       5/5                                               L          5/5        5/5        5/5      5/5    Lower extremity                           HF          KE          KF        DF         PF                                               R        5/5        5/5        5/5       5/5       5/5                                               L         5/5        5/5        5/5      5/5        5/5  Pronator drift:   none          Sensation: intact grossly to light touch  Tremor:  none appreciated at rest or in action    Deep Tendon Reflexes: 2+ throughout  Dysmetria: none with finger-to-nose testing        LABORATORY:  CBC                       14.3   5.32  )-----------( 237      ( 2019 11:30 )             42.1     Chem 11-    140  |  103  |  9   ----------------------------<  122<H>  3.4<L>   |  25  |  0.77    Ca    9.5      2019 11:30    TPro  7.9  /  Alb  4.7  /  TBili  0.4  /  DBili  x   /  AST  14  /  ALT  10  /  AlkPhos  59  11-02    LFTs LIVER FUNCTIONS - ( 2019 11:30 )  Alb: 4.7 g/dL / Pro: 7.9 g/dL / ALK PHOS: 59 u/L / ALT: 10 u/L / AST: 14 u/L / GGT: x           Coagulopathy PT/INR - ( 2019 11:30 )   PT: 11.3 SEC;   INR: 1.02          PTT - ( 2019 11:30 )  PTT:28.8 SEC  Lipid Panel   A1c   Cardiac enzymes CARDIAC MARKERS ( 2019 11:30 )  x     / x     / 141 u/L / 1.97 ng/mL / x          U/A Urinalysis Basic - ( 2019 12:00 )    Color: COLORLESS / Appearance: CLEAR / S.007 / pH: 7.0  Gluc: NEGATIVE / Ketone: NEGATIVE  / Bili: NEGATIVE / Urobili: NORMAL   Blood: NEGATIVE / Protein: NEGATIVE / Nitrite: NEGATIVE   Leuk Esterase: NEGATIVE / RBC: x / WBC x   Sq Epi: x / Non Sq Epi: x / Bacteria: x    < from: CT Angio Neck w/ IV Cont (19 @ 13:48) >  IMPRESSION:     CTA BRAIN: Left A2 segment anterior cerebral (pericallosal) aneurysm   measuring 2 mm in depth and 2 mm of the neck. Patent intracranial   circulation. No flow-limiting stenosis or occlusion.     CTA NECK: Patent cervical vasculature. No flow limiting stenosis or   occlusion.     All measurements are performed using standard NASCET criteria.    Dr. Jeffrey discussed these findings with Dr. Jason Snow on 2019   1:58 PM with read back.    < end of copied text >

## 2019-11-02 NOTE — ED CDU PROVIDER INITIAL DAY NOTE - OBJECTIVE STATEMENT
58 y/o F hx of HTN, HLD presenting with left sided weakness. note symptom onset at 8AM with severe symptoms for 20-30 minutes, but still with some complaints upon ED eval. no cp no sob no syncope no abd pain no n/v no f/c no ha.noted similar symptoms earlier this week that lasted 20 min and resolved, and similar issues in march for which she had a workup including carotid doppler and brain mri which were wnl. in ED pt was activated as a code stroke. ct and cta were negative for acute pathology. neurology decided against tpa, but requested cdu for tele, spot EEG, and MR.     labs notable for minimal hypoK

## 2019-11-03 VITALS
SYSTOLIC BLOOD PRESSURE: 130 MMHG | DIASTOLIC BLOOD PRESSURE: 67 MMHG | HEART RATE: 55 BPM | TEMPERATURE: 98 F | OXYGEN SATURATION: 98 % | RESPIRATION RATE: 17 BRPM

## 2019-11-03 LAB
CHOLEST SERPL-MCNC: 168 MG/DL — SIGNIFICANT CHANGE UP (ref 120–199)
HDLC SERPL-MCNC: 59 MG/DL — SIGNIFICANT CHANGE UP (ref 45–65)
LIPID PNL WITH DIRECT LDL SERPL: 107 MG/DL — SIGNIFICANT CHANGE UP
TRIGL SERPL-MCNC: 74 MG/DL — SIGNIFICANT CHANGE UP (ref 10–149)

## 2019-11-03 PROCEDURE — 99217: CPT

## 2019-11-03 RX ORDER — CLOPIDOGREL BISULFATE 75 MG/1
1 TABLET, FILM COATED ORAL
Qty: 7 | Refills: 0
Start: 2019-11-03 | End: 2019-11-09

## 2019-11-03 RX ADMIN — AMLODIPINE BESYLATE 5 MILLIGRAM(S): 2.5 TABLET ORAL at 06:00

## 2019-11-03 RX ADMIN — Medication 81 MILLIGRAM(S): at 12:52

## 2019-11-03 RX ADMIN — CLOPIDOGREL BISULFATE 75 MILLIGRAM(S): 75 TABLET, FILM COATED ORAL at 12:52

## 2019-11-03 RX ADMIN — PANTOPRAZOLE SODIUM 40 MILLIGRAM(S): 20 TABLET, DELAYED RELEASE ORAL at 06:00

## 2019-11-03 NOTE — ED CDU PROVIDER SUBSEQUENT DAY NOTE - PROGRESS NOTE DETAILS
received sign out from BIANCA Burris. pt seen and examined by Dr. Davis and I. MRI negative for stroke, pt with received sign out from BIANCA Burris. pt seen and examined by Dr. Davis and I. MRI negative for stroke, pt with 2mm aneurysm discussed results with pt and f/u. received sign out from BIANCA Burris. pt seen and examined by Dr. Davis and I. MRI negative for stroke, pt with 2mm aneurysm discussed results with pt and f/u. as per neuro, suggesting asa and plavix x 3 weeks, and would like EEG performed in CDU prior to discharge. EEG tech aware #[id number] and will come see pt. as per neuro, EEG negative will dc home on asa and plavix x 3 w

## 2019-11-03 NOTE — ED CDU PROVIDER DISPOSITION NOTE - NSFOLLOWUPINSTRUCTIONS_ED_ALL_ED_FT
Follow up with Tamia Meredith NP Neurology in 1 week  Call [phone]    Take Aspirin 81mg daily (over the counter)  Take Plavix daily for 3 weeks    Return to ER for any new or worsening symptoms, weakness, numbness, dizziness, headache, facial droop, slurred speech or any other concerns.

## 2019-11-03 NOTE — ED CDU PROVIDER SUBSEQUENT DAY NOTE - HISTORY
58 yo female, PMH of HTN, hyperlipidemia, presented to the ED for left sided weakness; hx/o similar symptoms in March 2019 for which she had a workup including carotid doppler and brain mri which were stated WNL.  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 Patent intracranial circulation.  No flow-limiting stenosis or occlusion.   CTA NECK: Patent cervical vasculature. No flow limiting stenosis or occlusion."  Neurology decided against TPA, but requested continued management in CDU for Tele monitoring, neuro checks, MRI/A, and spot EEG; Neuro team following.  In the interim thus far, pt objectively noted to be resting comfortably; no issues thus far.  MRI/A studies were completed; pending official radiology report.  Neuro team following pt.

## 2019-11-03 NOTE — CHART NOTE - NSCHARTNOTEFT_GEN_A_CORE
[facility]   COMPREHENSIVE EPILEPSY CENTER   REPORT OF ROUTINE VIDEO EEG     [facility]: [address], [place] [address], Ph#: [phone]  [place]: [address], [place] [address], Ph#: [phone]  [facility]: [address] [place] [address], Ph#: [phone]    Patient Name: ELIAN JOHNSON  Age and : 59y (60)  MRN #: [id number]  Location: [place] [address]  Referring Physician: Not Available Doctor    Study Date: 19    _____________________________________________________________  TECHNICAL INFORMATION    Placement and Labeling of Electrodes:  The EEG was performed utilizing 20 channels referential EEG connections (coronal over temporal over parasagittal montage) using all standard 10-20 electrode placements with EKG.  Recording was at a sampling rate of 256 samples per second per channel.  Time synchronized digital video recording was done simultaneously with EEG recording.  A low light infrared camera was used for low light recording.  Cirlio and seizure detection algorithms were utilized.    _____________________________________________________________  HISTORY    Patient is a 59y old  Female who presents with a chief complaint of     PERTINENT MEDICATION:  None.  _____________________________________________________________  STUDY INTERPRETATION    Findings: The background was continuous, spontaneously variable and reactive. During wakefulness, the posterior dominant rhythm consisted of symmetric, well-modulated 8.5 Hz activity, with amplitude to 20 uV, that attenuated to eye opening.  Low amplitude frontal beta was noted in wakefulness.    Background Slowing:  No generalized background slowing was present.    Focal Slowing:   None were present.    Sleep Background:  Drowsiness and stage II sleep transients were not recorded.      Other Non-Epileptiform Findings:  None were present.    Interictal Epileptiform Activity:   None were present.    Events:  Clinical events: None recorded.  Seizures: None recorded.    Activation Procedures:   Hyperventilation was performed and did not elicit any abnormality.  Photic stimulation was performed and did not elicit any abnormality.     Artifacts:  Intermittent myogenic and movement artifacts were noted.    ECG:  The heart rate on single channel ECG was predominantly between 60-70 BPM.    _____________________________________________________________  EEG SUMMARY/CLASSIFICATION    Normal  EEG in the awake state.    _____________________________________________________________  EEG IMPRESSION/CLINICAL CORRELATE    Normal EEG in the awake state  No epileptiform pattern or seizure seen.    Preliminary Fellow Report    _____________________________________________________________    Stephania Toribio MD  Epilepsy Fellow    Jhon August MD  Attending Physician, [facility] Epilepsy [facility]   COMPREHENSIVE EPILEPSY CENTER   REPORT OF ROUTINE VIDEO EEG     [facility]: [address], [place] [address], Ph#: [phone]  [place]: [address], [place] [address], Ph#: [phone]  [facility]: [address] [place] [address], Ph#: [phone]    Patient Name: ELIAN JOHNSON  Age and : 59y (60)  MRN #: [id number]  Location: [place] [address]  Referring Physician: Not Available Doctor    Study Date: 19    _____________________________________________________________  TECHNICAL INFORMATION    Placement and Labeling of Electrodes:  The EEG was performed utilizing 20 channels referential EEG connections (coronal over temporal over parasagittal montage) using all standard 10-20 electrode placements with EKG.  Recording was at a sampling rate of 256 samples per second per channel.  Time synchronized digital video recording was done simultaneously with EEG recording.  A low light infrared camera was used for low light recording.  Cirilo and seizure detection algorithms were utilized.    _____________________________________________________________  HISTORY    Patient is a 59y old  Female who presents with a chief complaint of     PERTINENT MEDICATION:  None.  _____________________________________________________________  STUDY INTERPRETATION    Findings: The background was continuous, spontaneously variable and reactive. During wakefulness, the posterior dominant rhythm consisted of symmetric, well-modulated 8.5 Hz activity, with amplitude to 20 uV, that attenuated to eye opening.  Low amplitude frontal beta was noted in wakefulness.    Background Slowing:  No generalized background slowing was present.    Focal Slowing:   None were present.    Sleep Background:  Drowsiness and stage II sleep transients were not recorded.      Other Non-Epileptiform Findings:  -Excessive beta activity, generalized    Interictal Epileptiform Activity:   None were present.    Events:  Clinical events: None recorded.  Seizures: None recorded.    Activation Procedures:   Hyperventilation was performed and did not elicit any abnormality.  Photic stimulation was performed and did not elicit any abnormality.     Artifacts:  Intermittent myogenic and movement artifacts were noted.    ECG:  The heart rate on single channel ECG was predominantly between 60-70 BPM.    _____________________________________________________________  EEG SUMMARY/CLASSIFICATION    Normal  EEG in the awake state.    _____________________________________________________________  EEG IMPRESSION/CLINICAL CORRELATE    Normal EEG in the awake state  Otherwise normal EEG, except for excessive generalized beta activity which can be an effect of medications (ie benzodiazepines or barbiturates).  No epileptiform pattern or seizure seen.    _____________________________________________________________    Stephania Toribio MD  Epilepsy Fellow    Jhon August MD  Attending Physician, [facility]

## 2019-11-03 NOTE — ED CDU PROVIDER DISPOSITION NOTE - PATIENT PORTAL LINK FT
You can access the FollowMyHealth Patient Portal offered by [facility] by registering at the following website: [email or web address]. By joining [employer]’s FollowMyHealth portal, you will also be able to view your health information using other applications (apps) compatible with our system.

## 2019-11-03 NOTE — ED CDU PROVIDER SUBSEQUENT DAY NOTE - MEDICAL DECISION MAKING DETAILS
Tele monitoring, neuro checks, follow up MRI/A official radiology reports; Neuro team following pt.; general observation care / monitoring.

## 2019-11-03 NOTE — ED CDU PROVIDER DISPOSITION NOTE - CLINICAL COURSE
58 yo female, PMH of HTN, hyperlipidemia, presented to the ED for left sided weakness; hx/o similar symptoms in March 2019 for which she had a workup including carotid doppler and brain mri which were stated WNL.  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 Patent intracranial circulation.  No flow-limiting stenosis or occlusion.   CTA NECK: Patent cervical vasculature. No flow limiting stenosis or occlusion."  Neurology decided against TPA, but requested continued management in CDU for Tele monitoring, neuro checks, MRI/A, and spot EEG; Neuro team following. MRI without evidence of acute stroke. Spot EEG no seizure. Discharged with outpt followup

## 2019-12-26 ENCOUNTER — EMERGENCY (EMERGENCY)
Facility: HOSPITAL | Age: 59
LOS: 1 days | Discharge: HOME | End: 2019-12-26
Admitting: EMERGENCY MEDICINE
Payer: COMMERCIAL

## 2019-12-26 VITALS
DIASTOLIC BLOOD PRESSURE: 73 MMHG | TEMPERATURE: 98 F | SYSTOLIC BLOOD PRESSURE: 134 MMHG | OXYGEN SATURATION: 100 % | RESPIRATION RATE: 18 BRPM | HEART RATE: 78 BPM

## 2019-12-26 PROBLEM — E78.5 HYPERLIPIDEMIA, UNSPECIFIED: Chronic | Status: ACTIVE | Noted: 2019-11-02

## 2019-12-26 LAB
APPEARANCE UR: CLEAR — SIGNIFICANT CHANGE UP
BILIRUB UR-MCNC: NEGATIVE — SIGNIFICANT CHANGE UP
BLOOD UR QL VISUAL: NEGATIVE — SIGNIFICANT CHANGE UP
COLOR SPEC: SIGNIFICANT CHANGE UP
EPI CELLS # UR: SIGNIFICANT CHANGE UP
GLUCOSE UR-MCNC: NEGATIVE — SIGNIFICANT CHANGE UP
KETONES UR-MCNC: NEGATIVE — SIGNIFICANT CHANGE UP
LEUKOCYTE ESTERASE UR-ACNC: NEGATIVE — SIGNIFICANT CHANGE UP
NITRITE UR-MCNC: POSITIVE — SIGNIFICANT CHANGE UP
PH UR: 7 — SIGNIFICANT CHANGE UP (ref 5–8)
PROT UR-MCNC: 10 — SIGNIFICANT CHANGE UP
SP GR SPEC: 1.02 — SIGNIFICANT CHANGE UP (ref 1–1.04)
UROBILINOGEN FLD QL: NORMAL — SIGNIFICANT CHANGE UP
WBC UR QL: SIGNIFICANT CHANGE UP (ref 0–?)

## 2019-12-26 PROCEDURE — 99284 EMERGENCY DEPT VISIT MOD MDM: CPT

## 2019-12-26 RX ORDER — KETOROLAC TROMETHAMINE 30 MG/ML
30 SYRINGE (ML) INJECTION ONCE
Refills: 0 | Status: DISCONTINUED | OUTPATIENT
Start: 2019-12-26 | End: 2019-12-26

## 2019-12-26 RX ORDER — DICLOFENAC SODIUM 75 MG/1
1 TABLET, DELAYED RELEASE ORAL
Qty: 10 | Refills: 0
Start: 2019-12-26 | End: 2019-12-30

## 2019-12-26 RX ADMIN — Medication 30 MILLIGRAM(S): at 13:52

## 2019-12-26 NOTE — ED ADULT TRIAGE NOTE - CHIEF COMPLAINT QUOTE
Pt with lower back pain x one week was seen at  urgent center given muscle relators. pt reports Pt today reports increased urination since this am but had negetive ua at urgent center.

## 2019-12-26 NOTE — ED PROVIDER NOTE - CLINICAL SUMMARY MEDICAL DECISION MAKING FREE TEXT BOX
60 y/o female c/o lower back pain and urinary frequency  -probable msk pain, r/o uti, low suspicion for pyelo  -pain control, ua ucx ucg  -reassess

## 2019-12-26 NOTE — ED PROVIDER NOTE - PATIENT PORTAL LINK FT
You can access the FollowMyHealth Patient Portal offered by [facility] by registering at the following website: [email or web address]. By joining [employer]’s FollowMyHealth portal, you will also be able to view your health information using other applications (apps) compatible with our system.

## 2019-12-26 NOTE — ED PROVIDER NOTE - OBJECTIVE STATEMENT
60 y/o female hx HLD HTN presents to ER c/o back pain x 4 days. Pt. states over the past 4 days she has been experiencing intermittent back pain since friday - states pain was initially on right side and now on left and radiates to bl buttocks - pain is worse with movement and ambulating. also c/o urinary urgency and frequency as well which has been intermittent. pt went to urgent care - had UA but was not given results - also dc with muslce relaxant with minimal relief. Denies fever chills nausea vomit weakness dizziness.

## 2019-12-27 LAB
BACTERIA UR CULT: SIGNIFICANT CHANGE UP
SPECIMEN SOURCE: SIGNIFICANT CHANGE UP

## 2020-01-10 NOTE — ED PROVIDER NOTE - NSTIMEPROVIDERCAREINITIATE_GEN_ER
Patient Education        Sinusitis: Care Instructions  Your Care Instructions    Sinusitis is an infection of the lining of the sinus cavities in your head. Sinusitis often follows a cold. It causes pain and pressure in your head and face. In most cases, sinusitis gets better on its own in 1 to 2 weeks. But some mild symptoms may last for several weeks. Sometimes antibiotics are needed. Follow-up care is a key part of your treatment and safety. Be sure to make and go to all appointments, and call your doctor if you are having problems. It's also a good idea to know your test results and keep a list of the medicines you take. How can you care for yourself at home? · Take an over-the-counter pain medicine, such as acetaminophen (Tylenol), ibuprofen (Advil, Motrin), or naproxen (Aleve). Read and follow all instructions on the label. · If the doctor prescribed antibiotics, take them as directed. Do not stop taking them just because you feel better. You need to take the full course of antibiotics. · Be careful when taking over-the-counter cold or flu medicines and Tylenol at the same time. Many of these medicines have acetaminophen, which is Tylenol. Read the labels to make sure that you are not taking more than the recommended dose. Too much acetaminophen (Tylenol) can be harmful. · Breathe warm, moist air from a steamy shower, a hot bath, or a sink filled with hot water. Avoid cold, dry air. Using a humidifier in your home may help. Follow the directions for cleaning the machine. · Use saline (saltwater) nasal washes to help keep your nasal passages open and wash out mucus and bacteria. You can buy saline nose drops at a grocery store or drugstore. Or you can make your own at home by adding 1 teaspoon of salt and 1 teaspoon of baking soda to 2 cups of distilled water. If you make your own, fill a bulb syringe with the solution, insert the tip into your nostril, and squeeze gently. Manohar Tamar your nose.   · Put a hot, wet towel or a warm gel pack on your face 3 or 4 times a day for 5 to 10 minutes each time. · Try a decongestant nasal spray like oxymetazoline (Afrin). Do not use it for more than 3 days in a row. Using it for more than 3 days can make your congestion worse. When should you call for help? Call your doctor now or seek immediate medical care if:    · You have new or worse swelling or redness in your face or around your eyes.     · You have a new or higher fever.    Watch closely for changes in your health, and be sure to contact your doctor if:    · You have new or worse facial pain.     · The mucus from your nose becomes thicker (like pus) or has new blood in it.     · You are not getting better as expected. Where can you learn more? Go to [email or web address] and sign in to your [employer] account. Enter A313 in the [employer] box to learn more about \"Sinusitis: Care Instructions. \"     If you do not have an account, please click on the \"Sign Up Now\" link. Current as of: July 28, 2019  Content Version: 12.3  © 8074-2116 Healthwise, Incorporated. Care instructions adapted under license by [facility] ([facility]). If you have questions about a medical condition or this instruction, always ask your healthcare professional. [address] any warranty or liability for your use of this information. 07-Mar-2019 10:37

## 2020-03-24 ENCOUNTER — EMERGENCY (EMERGENCY)
Facility: HOSPITAL | Age: 60
LOS: 1 days | Discharge: HOME | End: 2020-03-24
Attending: EMERGENCY MEDICINE
Payer: COMMERCIAL

## 2020-03-24 VITALS
TEMPERATURE: 99 F | SYSTOLIC BLOOD PRESSURE: 163 MMHG | RESPIRATION RATE: 24 BRPM | HEART RATE: 96 BPM | OXYGEN SATURATION: 97 % | DIASTOLIC BLOOD PRESSURE: 93 MMHG

## 2020-03-24 VITALS — RESPIRATION RATE: 16 BRPM

## 2020-03-24 PROCEDURE — 99283 EMERGENCY DEPT VISIT LOW MDM: CPT

## 2020-03-24 NOTE — ED PROVIDER NOTE - PATIENT PORTAL LINK FT
You can access the FollowMyHealth Patient Portal offered by [facility] by registering at the following website: [email or web address]. By joining [employer]’s FollowMyHealth portal, you will also be able to view your health information using other applications (apps) compatible with our system.

## 2020-03-24 NOTE — ED PROVIDER NOTE - CLINICAL SUMMARY MEDICAL DECISION MAKING FREE TEXT BOX
The patient does not have high-risk features of a severe infection (severe shortness of breath, light-headedness, or inability to perform ADLSs) or present with any concerning vital signs or symptoms that would indicate imminent respiratory failure.  I have had a long conversation with the patient regarding the need for return to the ED:  worsening cough, shortness of breath, syncope, near-syncope, or any other concerns.

## 2020-03-24 NOTE — ED PROVIDER NOTE - PHYSICAL EXAMINATION
pt alert and can phonate well  h at/nc  perrl, conj clear, sclera anicteric,  neck supple  throat no exudate  cor rrr pos s1s2  lungs clear to a,no wheeze  abd soft no r/g/t  ext no edema no deformities  neuro awake, lucid normal gait moves all extremities with strength  psych normal affect  vs reasonable    pt did amb puls ox with brisk near jo sat 100 at start, 98$ at finish

## 2020-03-24 NOTE — ED PROVIDER NOTE - OBJECTIVE STATEMENT
The patient presents out of a concern for a possible COVID infection. They have not recently travelled to any of the current high risk areas and have not been in close contact with a known positive COVID19-infected patient.  This patient complains of URI-like symptoms: sore, throat, cough, body aches, runny nose.  Associated Symptoms:  mild GONZALEZ, but able to walk briskly with o2 sat of 98%  Duration of symptoms:  5 days    pt with hx htn, glaucoma, high lipids, vertigo, gerd, denies dm, copd, asthma, smoking

## 2020-03-24 NOTE — ED PROVIDER NOTE - NSFOLLOWUPINSTRUCTIONS_ED_ALL_ED_FT
you appear to have a viral syndrome.  reasons to return to the ED include: worsening shortness of breath, cough, passing out, or feeling like you will pass out.    use tylenol every 4 hours 650 mg for fever and body aches    use loratadine 10 mg to help with nasal drip    use pseudoephedrine 30 mg every 6 hours for nasal congestion    treat the symptoms and return to the ED if any high risk symptoms     otherwise drink lots of fluids and get lots of rest     we did not test you for covid, so you need to self isolate for 14 days from the onset of symptoms    you [facility], please follow the directions listed

## 2020-06-29 ENCOUNTER — APPOINTMENT (OUTPATIENT)
Dept: ALLERGY | Facility: CLINIC | Age: 60
End: 2020-06-29
Payer: COMMERCIAL

## 2020-06-29 VITALS
HEART RATE: 63 BPM | SYSTOLIC BLOOD PRESSURE: 140 MMHG | WEIGHT: 110 LBS | TEMPERATURE: 98.9 F | RESPIRATION RATE: 16 BRPM | DIASTOLIC BLOOD PRESSURE: 82 MMHG | HEIGHT: 59 IN | BODY MASS INDEX: 22.18 KG/M2 | OXYGEN SATURATION: 97 %

## 2020-06-29 DIAGNOSIS — R09.81 NASAL CONGESTION: ICD-10-CM

## 2020-06-29 PROCEDURE — 95004 PERQ TESTS W/ALRGNC XTRCS: CPT

## 2020-06-29 PROCEDURE — 95018 ALL TSTG PERQ&IQ DRUGS/BIOL: CPT

## 2020-06-29 PROCEDURE — 99204 OFFICE O/P NEW MOD 45 MIN: CPT | Mod: 25

## 2020-06-29 RX ORDER — LATANOPROST/PF 0.005 %
0.01 DROPS OPHTHALMIC (EYE)
Refills: 0 | Status: ACTIVE | COMMUNITY

## 2020-06-29 RX ORDER — CLOPIDOGREL BISULFATE 75 MG/1
75 TABLET, FILM COATED ORAL
Refills: 0 | Status: ACTIVE | COMMUNITY

## 2020-06-29 RX ORDER — ROSUVASTATIN CALCIUM 5 MG/1
TABLET, FILM COATED ORAL
Refills: 0 | Status: ACTIVE | COMMUNITY

## 2020-06-29 RX ORDER — PANTOPRAZOLE 40 MG/1
TABLET, DELAYED RELEASE ORAL
Refills: 0 | Status: ACTIVE | COMMUNITY

## 2020-06-29 NOTE — PHYSICAL EXAM
[Alert] : alert [Well Nourished] : well nourished [Healthy Appearance] : healthy appearance [No Acute Distress] : no acute distress [Well Developed] : well developed [No Discharge] : no discharge [Normal Pupil & Iris Size/Symmetry] : normal pupil and iris size and symmetry [Sclera Not Icteric] : sclera not icteric [Normal Lips/Tongue] : the lips and tongue were normal [Normal Nasal Mucosa] : the nasal mucosa was normal [Normal Tonsils] : normal tonsils [Normal Dentition] : normal dentition [No Oral Lesions or Ulcers] : no oral lesions or ulcers [No LAD] : no lymphadenopathy [No Neck Mass] : no neck mass was observed [Supple] : the neck was supple [No Thyroid Mass] : no thyroid mass [Normal Rate and Effort] : normal respiratory rhythm and effort [No Crackles] : no crackles [No Retractions] : no retractions [Bilateral Audible Breath Sounds] : bilateral audible breath sounds [Normal Rate] : heart rate was normal  [Normal S1, S2] : normal S1 and S2 [No murmur] : no murmur [Regular Rhythm] : with a regular rhythm [Normal Axillary Lumph Nodes] : axillary [Normal Cervical Lymph Nodes] : cervical [Skin Intact] : skin intact  [No Rash] : no rash [No Skin Lesions] : no skin lesions [No Joint Swelling or Erythema] : no joint swelling or erythema [No clubbing] : no clubbing [No Edema] : no edema [Normal Mood] : mood was normal [No Cyanosis] : no cyanosis [Normal Affect] : affect was normal [Alert, Awake, Oriented as Age-Appropriate] : alert, awake, oriented as age appropriate [Boggy Nasal Turbinates] : no boggy and/or pale nasal turbinates [Posterior Pharyngeal Cobblestoning] : no posterior pharyngeal cobblestoning

## 2020-06-29 NOTE — IMPRESSION
[Allergy Testing Dust Mite] : dust mites [Allergy Testing Weeds] : weeds [Allergy Testing Dog] : dog [Allergy Testing Cockroach] : cockroach [Allergy Testing Cat] : cat [] : molds [Allergy Testing Trees] : trees [Allergy Testing Grasses] : grasses

## 2020-06-29 NOTE — SOCIAL HISTORY
[Apartment] : [unfilled] lives in an apartment  [Bedroom] :  in bedroom [Living Area] : in living area [Radiator/Baseboard] : heating provided by radiator(s)/baseboard(s) [None] : none [Single] : single [de-identified] : lives alone  [Smokers in Household] : there are no smokers in the home [de-identified] : air from unit on wall  [FreeTextEntry2] :

## 2020-06-29 NOTE — HISTORY OF PRESENT ILLNESS
[Asthma] : asthma [Allergic Rhinitis] : allergic rhinitis [Venom Reactions] : venom reactions [Food Allergies] : food allergies [de-identified] : Patient reports a 3 month history of difficulty breathing thru her nose and mouth - associated with increased post nasal drip - not producing any mucus.   She gets choking sensation from the mucus - interrupts her ability to sleep - patient has seen PCP - Medrol and Zpack - no improvement in her symptoms.   She saw ENT neomycin - gentamicin - OTC antihistamines - no improvement in her symptoms.  \par \par No previous history of sinus infections or asthma.  No evidence of a sinus infection on endoscopy.   CXR was normal.  \par \par Patient has GERD - controlled with PPI medications. \par \par Patient has noted some black patches adjacent to the heating/AC unit in her bedroom - she was advised to clean with bleach until a  can address the problem .

## 2020-06-29 NOTE — ASSESSMENT
[FreeTextEntry1] : Nasal congestion associated with SOB:\par \par RV environmental intradermal skin testing \par Atrovent 0.03% TID \par Consider sinus CT scan

## 2020-07-09 ENCOUNTER — APPOINTMENT (OUTPATIENT)
Dept: ALLERGY | Facility: CLINIC | Age: 60
End: 2020-07-09
Payer: COMMERCIAL

## 2020-07-09 VITALS
RESPIRATION RATE: 14 BRPM | DIASTOLIC BLOOD PRESSURE: 70 MMHG | SYSTOLIC BLOOD PRESSURE: 110 MMHG | OXYGEN SATURATION: 97 % | HEART RATE: 62 BPM

## 2020-07-09 PROCEDURE — 95018 ALL TSTG PERQ&IQ DRUGS/BIOL: CPT

## 2020-07-09 PROCEDURE — 99213 OFFICE O/P EST LOW 20 MIN: CPT | Mod: 25

## 2020-07-09 PROCEDURE — 95024 IQ TESTS W/ALLERGENIC XTRCS: CPT

## 2020-07-09 NOTE — REASON FOR VISIT
[Routine Follow-Up] : a routine follow-up visit for [FreeTextEntry3] : patient being seen for second panel of allergy skin testing and follow up

## 2020-07-09 NOTE — HISTORY OF PRESENT ILLNESS
[Asthma] : asthma [Eczematous rashes] : eczematous rashes [Food Allergies] : food allergies [Venom Reactions] : venom reactions [de-identified] : Patient continues to have sensation of nasal obstruction with difficulty breathing thru her nose despite medical therapy

## 2020-07-09 NOTE — PHYSICAL EXAM
[Well Nourished] : well nourished [Alert] : alert [Healthy Appearance] : healthy appearance [Well Developed] : well developed [No Acute Distress] : no acute distress [Normal Pupil & Iris Size/Symmetry] : normal pupil and iris size and symmetry [No Discharge] : no discharge [Sclera Not Icteric] : sclera not icteric [Normal Nasal Mucosa] : the nasal mucosa was normal [Normal Lips/Tongue] : the lips and tongue were normal [Normal Dentition] : normal dentition [Normal Tonsils] : normal tonsils [Boggy Nasal Turbinates] : no boggy and/or pale nasal turbinates [No Oral Lesions or Ulcers] : no oral lesions or ulcers [No Neck Mass] : no neck mass was observed [Posterior Pharyngeal Cobblestoning] : no posterior pharyngeal cobblestoning [No LAD] : no lymphadenopathy [No Thyroid Mass] : no thyroid mass [Normal Rate and Effort] : normal respiratory rhythm and effort [Supple] : the neck was supple [No Retractions] : no retractions [No Crackles] : no crackles [Bilateral Audible Breath Sounds] : bilateral audible breath sounds [Normal Rate] : heart rate was normal  [No murmur] : no murmur [Normal S1, S2] : normal S1 and S2 [Regular Rhythm] : with a regular rhythm [Normal Cervical Lymph Nodes] : cervical [Normal Axillary Lumph Nodes] : axillary [Skin Intact] : skin intact  [No Skin Lesions] : no skin lesions [No Rash] : no rash [No Joint Swelling or Erythema] : no joint swelling or erythema [No clubbing] : no clubbing [No Edema] : no edema [No Cyanosis] : no cyanosis [Normal Mood] : mood was normal [Alert, Awake, Oriented as Age-Appropriate] : alert, awake, oriented as age appropriate [Normal Affect] : affect was normal

## 2020-07-09 NOTE — ASSESSMENT
[FreeTextEntry1] : Perennial allergic rhinitis with nasal obstruction:\par \par CT sinuses pending\par Add hypertonic saline irrigation\par Continue Atrovent BID \par Call after CT done

## 2020-07-09 NOTE — SOCIAL HISTORY
[de-identified] : lives alone  [FreeTextEntry2] :   [Apartment] : [unfilled] lives in an apartment  [Radiator/Baseboard] : heating provided by radiator(s)/baseboard(s) [Living Area] : in living area [Bedroom] :  in bedroom [Single] : single [None] : none [Smokers in Household] : there are no smokers in the home [de-identified] : air from unit on wall

## 2020-07-13 ENCOUNTER — EMERGENCY (EMERGENCY)
Facility: HOSPITAL | Age: 60
LOS: 1 days | Discharge: HOME | End: 2020-07-13
Attending: EMERGENCY MEDICINE | Admitting: EMERGENCY MEDICINE
Payer: COMMERCIAL

## 2020-07-13 VITALS
RESPIRATION RATE: 17 BRPM | OXYGEN SATURATION: 100 % | DIASTOLIC BLOOD PRESSURE: 82 MMHG | HEART RATE: 62 BPM | SYSTOLIC BLOOD PRESSURE: 165 MMHG

## 2020-07-13 VITALS
OXYGEN SATURATION: 99 % | DIASTOLIC BLOOD PRESSURE: 84 MMHG | TEMPERATURE: 99 F | SYSTOLIC BLOOD PRESSURE: 154 MMHG | HEART RATE: 64 BPM | RESPIRATION RATE: 18 BRPM

## 2020-07-13 PROCEDURE — 99283 EMERGENCY DEPT VISIT LOW MDM: CPT

## 2020-07-13 RX ORDER — HYDROCHLOROTHIAZIDE 25 MG
25 TABLET ORAL ONCE
Refills: 0 | Status: COMPLETED | OUTPATIENT
Start: 2020-07-13 | End: 2020-07-13

## 2020-07-13 RX ADMIN — Medication 25 MILLIGRAM(S): at 13:44

## 2020-07-13 RX ADMIN — Medication 1 TABLET(S): at 13:44

## 2020-07-13 NOTE — ED ADULT NURSE NOTE - OBJECTIVE STATEMENT
Receive pt. in ER room 8 alert and oriented x 4, presenting to the ER with complaints of nasal congestion and thick mucus in throat. Pt. have a history of HTN, HLD and stated " for 4 months I cannot breathe out of my nose, I have mucus in my throat all the time". No respiratory distress, no c/o pain, no coughing, will continue to monitor.

## 2020-07-13 NOTE — ED PROVIDER NOTE - NS ED ROS FT
GENERAL: No fever or chills, //             EYES: no change in vision, //             HEENT:nasal congestion , //             CARDIAC: no chest pain, //              PULMONARY: no cough or SOB, //             GI: no abdominal pain, no nausea or no vomiting, no diarrhea or constipation, //             : No changes in urination,  //            SKIN: no rashes,  //            NEURO: no headache,  //             MSK: No joint pain otherwise as HPI or negative. ~Jamar Barbosa DO PGY3

## 2020-07-13 NOTE — ED ADULT TRIAGE NOTE - CHIEF COMPLAINT QUOTE
pt c/o nasal congestion and post nasal drip x 4 months. states she was prescribed multiple nasal sprays by ent and allergist with no improvement. reports hard time breathing. and has difficulty eating and swallowing " I feel like its sticking with the mucus..it feels like my throat is swelling up." denies fever/chills. pt noted having to pull down mask multiple times in triage to breathe

## 2020-07-13 NOTE — ED PROVIDER NOTE - PATIENT PORTAL LINK FT
You can access the FollowMyHealth Patient Portal offered by [facility] by registering at the following website: [email or web address]. By joining [employer]’s FollowMyHealth portal, you will also be able to view your health information using other applications (apps) compatible with our system.

## 2020-07-13 NOTE — ED PROVIDER NOTE - CLINICAL SUMMARY MEDICAL DECISION MAKING FREE TEXT BOX
jeovany pgy3: 58 yo f pw sinusitis since march 2020, persistent despite multiple outpatient interventions. hds, well appearing, afebrile, no neuro deficits. has close fu w/ ent and recent imaging performed however unable to locate within EMR. will place on course of augmentin as pt has not had that yet and pt states will fu w/ ent. strict return precautions given.

## 2020-07-13 NOTE — ED PROVIDER NOTE - ATTENDING CONTRIBUTION TO CARE
Attending Statement: I have personally seen and examined this patient. I have fully participated in the care of this patient. I have reviewed all pertinent clinical information, including history physical exam, plan and the Resident's note and agree except as noted   58yo F hx of HTN, HLD, hx vertigo, hx of sinusitis pw pain along sinuses. no fever/chills no headache no cough no neck pain. pt has been evaluated bvy pmd and ENT recently has been taking nasal decongestant, steroid, zithromax, w min relief. no change in taste no cp or sob no travel no sick contact. nonsmoker  Vital signs noted. nontoxic female. EOMI. PERRLA nontender along frontal/max sinuse. mmm. No resp distress. able to speak in full and clear sentences. no wheeze, rales or stridor.  plan missed her po bp med, will medicate. tx w po augmentin

## 2020-07-13 NOTE — ED PROVIDER NOTE - NSFOLLOWUPINSTRUCTIONS_ED_ALL_ED_FT
1) Please follow up with your Primary Care Provider in 24-48 hours  2) Seek immediate medical care for any new or returning symptoms including but not limited severe pain, high fevers, difficulty breathing  3) Take Augmentin twice a day  4) Please see your ENT doctor within one week

## 2020-07-13 NOTE — ED PROVIDER NOTE - OBJECTIVE STATEMENT
58 yo f pmh htn, hld, vertigo, sinusitis pw PND. pt reports hx of sx since march 2020, has been eval for sx multiple times by ED and ENT specialist, has attempted multiple interventions including nasal decongestants, steroids, azithromycin. last week had CT sinuses performed, unk results. presents today due to persistent diff breathing through nose and nasal congestion. denies f/c, cp, sob, n/v, cough, sick contacts.

## 2020-07-20 ENCOUNTER — EMERGENCY (EMERGENCY)
Facility: HOSPITAL | Age: 60
LOS: 1 days | Discharge: HOME | End: 2020-07-20
Attending: EMERGENCY MEDICINE | Admitting: EMERGENCY MEDICINE
Payer: COMMERCIAL

## 2020-07-20 VITALS
OXYGEN SATURATION: 98 % | RESPIRATION RATE: 18 BRPM | SYSTOLIC BLOOD PRESSURE: 157 MMHG | DIASTOLIC BLOOD PRESSURE: 85 MMHG | HEART RATE: 78 BPM | TEMPERATURE: 98 F

## 2020-07-20 VITALS
OXYGEN SATURATION: 99 % | DIASTOLIC BLOOD PRESSURE: 63 MMHG | SYSTOLIC BLOOD PRESSURE: 120 MMHG | RESPIRATION RATE: 18 BRPM | TEMPERATURE: 98 F | HEART RATE: 57 BPM

## 2020-07-20 PROCEDURE — 99283 EMERGENCY DEPT VISIT LOW MDM: CPT

## 2020-07-20 RX ORDER — CYCLOBENZAPRINE HYDROCHLORIDE 10 MG/1
10 TABLET, FILM COATED ORAL ONCE
Refills: 0 | Status: COMPLETED | OUTPATIENT
Start: 2020-07-20 | End: 2020-07-20

## 2020-07-20 RX ORDER — IBUPROFEN 200 MG
600 TABLET ORAL ONCE
Refills: 0 | Status: COMPLETED | OUTPATIENT
Start: 2020-07-20 | End: 2020-07-20

## 2020-07-20 RX ORDER — DIAZEPAM 5 MG
1 TABLET ORAL
Qty: 6 | Refills: 0
Start: 2020-07-20 | End: 2020-07-21

## 2020-07-20 RX ORDER — DIAZEPAM 5 MG
5 TABLET ORAL ONCE
Refills: 0 | Status: DISCONTINUED | OUTPATIENT
Start: 2020-07-20 | End: 2020-07-20

## 2020-07-20 RX ADMIN — CYCLOBENZAPRINE HYDROCHLORIDE 10 MILLIGRAM(S): 10 TABLET, FILM COATED ORAL at 11:39

## 2020-07-20 RX ADMIN — Medication 5 MILLIGRAM(S): at 13:09

## 2020-07-20 RX ADMIN — Medication 600 MILLIGRAM(S): at 11:39

## 2020-07-20 NOTE — ED PROVIDER NOTE - ATTENDING CONTRIBUTION TO CARE
DR. ENRIQUE, ATTENDING MD-  I performed a face to face bedside interview with the patient regarding history of present illness, review of symptoms and past medical history. I completed an independent physical exam.  I have discussed the patient's plan of care with the resident.   Documentation as above in the note.    60 y/o female h/o htn, hld, tia, gerd p/w gradually worsening neck, trapezius, ha pain x2 days.  Pain in neck is at b/l paraspinal cervical region, pain in head is band like global pain.  Denies f/c, neck stiffness, cp, sob, cough, abd pain, n/v/d, dysuria, rash.  Afebrile, vs wnl, grimacing, b/l ttp paraspinal cervical region, non ttp spinous midline, full rom neck, ctabil, s1s2 rrr no m/r/g, abd soft non ttp no r/g, no cva tenderness b/l, no leg swelling b/l, no rash, motor 5/5 x4 ext, distal sensory grossly intact x4 ext, normal gait, normal heel to shin, normal finger to nose.  MSK strain vs tension ha, will give pain meds, reassess, no red flags, antic dc with neuro and spine center f/u as o/p.

## 2020-07-20 NOTE — ED PROVIDER NOTE - CLINICAL SUMMARY MEDICAL DECISION MAKING FREE TEXT BOX
60 y/o F PMH HTN, HLD, TIA (2019, on plavix), GERD presents to the ED from home with c/o gradual onset neck pain radiating into her head and shoulders x2 days. Denies dizziness, n/v, visual changes, numbness/tingling, weakness, difficulty ambulating, fevers/chills. Denies trauma to the neck/spine. ddx tension headache, muscle spasm (most likely), also considered lower suspicion migraine, cluster headache. plan motrin, muscle relaxant, reassess.

## 2020-07-20 NOTE — ED PROVIDER NOTE - NSFOLLOWUPCLINICS_GEN_ALL_ED_FT
A Neurologist  Neurology  .  [place]   Phone:   Fax:     [facility]  Neurology  [address] - [address] [place] [address]  Phone: [phone]  Fax:     [facility] Spine [facility]  Ortho/Spine  [address] [place] [id number]  Phone: [phone]  Fax:   Follow Up Time:

## 2020-07-20 NOTE — ED PROVIDER NOTE - NSFOLLOWUPINSTRUCTIONS_ED_ALL_ED_FT
Take cyclobenzaprine (muscle relaxant) 10mg every 8 hours as needed for muscle spasm.     You can use 400-600mg Ibuprofen (such as motrin or advil) every 6 to 8 hours as needed for pain control.  Take ibuprofen with food or milk to lessen stomach upset.  This is an over-the-counter medication please respect the warnings on the label. All medications come with certain risks and side effects that you need to discuss with your doctor, especially if you are taking them for a prolonged period.    Follow-up with your Neurologist - call tomorrow to make an appointment in the next week for recheck.     Follow-up with an Orthopedic Surgeon to further discuss your neck pain.    Return to the ED for any worsening pain, weakness in your arms or legs, problems walking or speaking or any new concerning symptoms. Take Valium 5mg every 8 hours as needed for muscle spasm. DO NOT DRIVE OPERATE MACHINERY DANGEROUS TOOLS AND MAKE IMPORTANT DECISIONS WHILE TAKING THIS MEDICINE.    You can use 400-600mg Ibuprofen (such as motrin or advil) every 6 to 8 hours as needed for pain control.  Take ibuprofen with food or milk to lessen stomach upset.  This is an over-the-counter medication please respect the warnings on the label. All medications come with certain risks and side effects that you need to discuss with your doctor, especially if you are taking them for a prolonged period.    Follow-up with your Neurologist - call tomorrow to make an appointment in the next week for recheck.     Follow-up with an the spine center to further discuss your neck pain.    Return to the ED for any worsening pain, weakness in your arms or legs, problems walking or speaking or any new concerning symptoms.

## 2020-07-20 NOTE — ED PROVIDER NOTE - OBJECTIVE STATEMENT
58 y/o F PMH HTN, HLD, TIA (2019, on plavix), GERD presents to the ED from home with c/o gradual onset neck pain radiating into her head and shoulders x2 days, described as "tight, squeezing" Denies dizziness, n/v, visual changes, numbness/tingling, weakness, difficulty ambulating, fevers/chills. Denies trauma to the neck/spine. No spine surgeries. Pain unrelieved with tylenol at home. Denies other recent illness, cp, abd pain.

## 2020-07-20 NOTE — ED PROVIDER NOTE - PROGRESS NOTE DETAILS
Ford, PGY2 - pt reassessed, neck pain and stiffness mildly improved from arrival. Neuro exam unchanged, still neurologically intact. AROM neck rotation improved from arrival. will give valium, reass likely dispo with flexeril Ford, PGY2 - pt reassessed, neck pain and stiffness mildly improved from arrival. Neuro exam unchanged, no deficits. AROM neck rotation improved from arrival. Pt also notes tingling radiating down left arm that comes and goes, possible nerve impingement. denies at this time. will give valium, reass likely dispo with muscle relaxant, motrin, neuro and Ortho f/u. Discussed with pt, agrees with plan, all questions answered. Ford, PGY2 - pt reassessed after valium, reports pain improved from arrival. also discussed again dispo plan f/u with neuro and spine center. pt agrees with plan, all questions answered. sister picking her up.

## 2020-07-20 NOTE — ED ADULT NURSE NOTE - CHPI ED NUR SYMPTOMS NEG
no change in level of consciousness/no blurred vision/no fever/no dizziness/no loss of consciousness

## 2020-07-20 NOTE — ED ADULT TRIAGE NOTE - CHIEF COMPLAINT QUOTE
p/t c/o of neck pain radiating to head for past few days, denies any trauma no neuro deficits noted, p/t ambulatory

## 2020-07-20 NOTE — ED ADULT NURSE NOTE - OBJECTIVE STATEMENT
59-year-old female presents to ED complaining of neck pain.  Pt states pain started on the left side yesterday but has worsened and is not generalized.  Pain described as sharp, constant pain 10/10, not relieved by Tylenol.  Decreased range of motion noted, patient states she has difficulty lying flat.  Pt states she was "doing nothing" when pain started.  Denies tingling, numbness.  Left-sided neck pain worse on palpation.

## 2020-07-20 NOTE — ED PROVIDER NOTE - PHYSICAL EXAMINATION
Gen: well appearing female   HEENT: NCAT, EOMI, no nasal discharge, mucous membranes moist  CV: RRR, +S1/S2, no M/R/G  Resp: CTAB, no W/R/R  GI: Abdomen soft non-distended, NTTP, no masses  MSK: +TTP b/l trapezius muscles no midline bony tenderness to c/t spine   Neuro: CN2-12 grossly intact, A&Ox4, MS +5/5 in UE and LE BL, finger to nose smooth and rapid, gross sensation intact in UE and LE BL, gait smooth and coordinated, negative rhomberg, negative pronator drift  Psych: appropriate mood

## 2020-07-20 NOTE — ED ADULT NURSE REASSESSMENT NOTE - NS ED NURSE REASSESS COMMENT FT1
pt resting in results waiting. Pt alert and oriented x4. c.o  10/10 neck pain, escalated to md for pain medication. pt waiting for reassessment

## 2020-08-28 ENCOUNTER — RESULT REVIEW (OUTPATIENT)
Age: 60
End: 2020-08-28

## 2020-08-28 PROBLEM — K21.9 GASTRO-ESOPHAGEAL REFLUX DISEASE WITHOUT ESOPHAGITIS: Chronic | Status: ACTIVE | Noted: 2020-07-20

## 2020-09-18 ENCOUNTER — APPOINTMENT (OUTPATIENT)
Dept: GYNECOLOGIC ONCOLOGY | Facility: CLINIC | Age: 60
End: 2020-09-18
Payer: COMMERCIAL

## 2020-09-18 VITALS
DIASTOLIC BLOOD PRESSURE: 77 MMHG | SYSTOLIC BLOOD PRESSURE: 155 MMHG | WEIGHT: 110.38 LBS | HEART RATE: 72 BPM | HEIGHT: 59 IN | BODY MASS INDEX: 22.25 KG/M2

## 2020-09-18 DIAGNOSIS — H40.9 UNSPECIFIED GLAUCOMA: ICD-10-CM

## 2020-09-18 DIAGNOSIS — G45.9 TRANSIENT CEREBRAL ISCHEMIC ATTACK, UNSPECIFIED: ICD-10-CM

## 2020-09-18 DIAGNOSIS — E78.00 PURE HYPERCHOLESTEROLEMIA, UNSPECIFIED: ICD-10-CM

## 2020-09-18 DIAGNOSIS — K21.9 GASTRO-ESOPHAGEAL REFLUX DISEASE W/OUT ESOPHAGITIS: ICD-10-CM

## 2020-09-18 DIAGNOSIS — I10 ESSENTIAL (PRIMARY) HYPERTENSION: ICD-10-CM

## 2020-09-18 DIAGNOSIS — Z86.018 PERSONAL HISTORY OF OTHER BENIGN NEOPLASM: ICD-10-CM

## 2020-09-18 DIAGNOSIS — R63.4 ABNORMAL WEIGHT LOSS: ICD-10-CM

## 2020-09-18 PROCEDURE — 99244 OFF/OP CNSLTJ NEW/EST MOD 40: CPT

## 2020-09-18 RX ORDER — VALSARTAN 40 MG/1
TABLET, COATED ORAL
Refills: 0 | Status: COMPLETED | COMMUNITY
End: 2020-09-18

## 2020-09-18 RX ORDER — AMLODIPINE BESYLATE 5 MG/1
TABLET ORAL
Refills: 0 | Status: COMPLETED | COMMUNITY
End: 2020-09-18

## 2020-09-18 RX ORDER — HYDROCHLOROTHIAZIDE 12.5 MG/1
TABLET ORAL
Refills: 0 | Status: ACTIVE | COMMUNITY

## 2020-09-28 ENCOUNTER — RX RENEWAL (OUTPATIENT)
Age: 60
End: 2020-09-28

## 2020-09-28 RX ORDER — IPRATROPIUM BROMIDE 21 UG/1
0.03 SPRAY NASAL 3 TIMES DAILY
Qty: 1 | Refills: 2 | Status: ACTIVE | COMMUNITY
Start: 2020-06-29 | End: 1900-01-01

## 2020-10-16 ENCOUNTER — APPOINTMENT (OUTPATIENT)
Dept: GYNECOLOGIC ONCOLOGY | Facility: CLINIC | Age: 60
End: 2020-10-16
Payer: COMMERCIAL

## 2020-10-16 VITALS
SYSTOLIC BLOOD PRESSURE: 174 MMHG | HEIGHT: 59 IN | WEIGHT: 110 LBS | BODY MASS INDEX: 22.18 KG/M2 | DIASTOLIC BLOOD PRESSURE: 69 MMHG | HEART RATE: 74 BPM

## 2020-10-16 PROCEDURE — ZZZZZ: CPT

## 2020-10-19 ENCOUNTER — NON-APPOINTMENT (OUTPATIENT)
Age: 60
End: 2020-10-19

## 2020-10-26 ENCOUNTER — OUTPATIENT (OUTPATIENT)
Dept: OUTPATIENT SERVICES | Facility: HOSPITAL | Age: 60
LOS: 1 days | End: 2020-10-26
Payer: COMMERCIAL

## 2020-10-26 VITALS
RESPIRATION RATE: 16 BRPM | WEIGHT: 108.91 LBS | SYSTOLIC BLOOD PRESSURE: 124 MMHG | OXYGEN SATURATION: 99 % | DIASTOLIC BLOOD PRESSURE: 80 MMHG | HEART RATE: 70 BPM | HEIGHT: 61 IN | TEMPERATURE: 98 F

## 2020-10-26 DIAGNOSIS — Z98.890 OTHER SPECIFIED POSTPROCEDURAL STATES: Chronic | ICD-10-CM

## 2020-10-26 DIAGNOSIS — R63.4 ABNORMAL WEIGHT LOSS: ICD-10-CM

## 2020-10-26 DIAGNOSIS — Z87.898 PERSONAL HISTORY OF OTHER SPECIFIED CONDITIONS: ICD-10-CM

## 2020-10-26 LAB
ANION GAP SERPL CALC-SCNC: 12 MMO/L — SIGNIFICANT CHANGE UP (ref 7–14)
BLD GP AB SCN SERPL QL: NEGATIVE — SIGNIFICANT CHANGE UP
BUN SERPL-MCNC: 9 MG/DL — SIGNIFICANT CHANGE UP (ref 7–23)
CALCIUM SERPL-MCNC: 10.1 MG/DL — SIGNIFICANT CHANGE UP (ref 8.4–10.5)
CHLORIDE SERPL-SCNC: 99 MMOL/L — SIGNIFICANT CHANGE UP (ref 98–107)
CO2 SERPL-SCNC: 29 MMOL/L — SIGNIFICANT CHANGE UP (ref 22–31)
CREAT SERPL-MCNC: 0.77 MG/DL — SIGNIFICANT CHANGE UP (ref 0.5–1.3)
GLUCOSE SERPL-MCNC: 95 MG/DL — SIGNIFICANT CHANGE UP (ref 70–99)
HCT VFR BLD CALC: 46.9 % — HIGH (ref 34.5–45)
HGB BLD-MCNC: 15.2 G/DL — SIGNIFICANT CHANGE UP (ref 11.5–15.5)
MCHC RBC-ENTMCNC: 29.2 PG — SIGNIFICANT CHANGE UP (ref 27–34)
MCHC RBC-ENTMCNC: 32.4 % — SIGNIFICANT CHANGE UP (ref 32–36)
MCV RBC AUTO: 90.2 FL — SIGNIFICANT CHANGE UP (ref 80–100)
NRBC # FLD: 0 K/UL — SIGNIFICANT CHANGE UP (ref 0–0)
PLATELET # BLD AUTO: 238 K/UL — SIGNIFICANT CHANGE UP (ref 150–400)
PMV BLD: 10.3 FL — SIGNIFICANT CHANGE UP (ref 7–13)
POTASSIUM SERPL-MCNC: 3.7 MMOL/L — SIGNIFICANT CHANGE UP (ref 3.5–5.3)
POTASSIUM SERPL-SCNC: 3.7 MMOL/L — SIGNIFICANT CHANGE UP (ref 3.5–5.3)
RBC # BLD: 5.2 M/UL — SIGNIFICANT CHANGE UP (ref 3.8–5.2)
RBC # FLD: 12.4 % — SIGNIFICANT CHANGE UP (ref 10.3–14.5)
RH IG SCN BLD-IMP: POSITIVE — SIGNIFICANT CHANGE UP
SODIUM SERPL-SCNC: 140 MMOL/L — SIGNIFICANT CHANGE UP (ref 135–145)
WBC # BLD: 3.88 K/UL — SIGNIFICANT CHANGE UP (ref 3.8–10.5)
WBC # FLD AUTO: 3.88 K/UL — SIGNIFICANT CHANGE UP (ref 3.8–10.5)

## 2020-10-26 PROCEDURE — 93010 ELECTROCARDIOGRAM REPORT: CPT

## 2020-10-26 RX ORDER — AMLODIPINE AND VALSARTAN 5; 320 MG/1; MG/1
1 TABLET, FILM COATED ORAL
Qty: 0 | Refills: 0 | DISCHARGE

## 2020-10-26 RX ORDER — SODIUM CHLORIDE 9 MG/ML
1000 INJECTION, SOLUTION INTRAVENOUS
Refills: 0 | Status: DISCONTINUED | OUTPATIENT
Start: 2020-11-02 | End: 2020-11-02

## 2020-10-26 NOTE — H&P PST ADULT - NEGATIVE GENERAL SYMPTOMS
no chills/no sweating/no anorexia/no weight gain/no polyphagia/no fever/no malaise/no fatigue/no polydipsia/no polyuria

## 2020-10-26 NOTE — H&P PST ADULT - NSICDXPROBLEM_GEN_ALL_CORE_FT
PROBLEM DIAGNOSES  Problem: H/O abnormal weight loss  Assessment and Plan: Preop instructions provided including npo status, Hibiclens wash for infection control and ppi/pepcid for GI prophylasix. Pt to c/w current meds, (qam hctz (will take in am dos), qpm drops to eyes, and crestor, aware to stop any NSAIDS, OTC herbals or MVI on 10/26/20. Verbilized understanding. BW done, pending results. DVT prophylasix as per primary team. MC pending, form provided. seen last wk, awaiting bw/ecg from us.  Aware to f/u on covid testing prior to sx as per pst protocol and has appt.

## 2020-10-26 NOTE — H&P PST ADULT - NSICDXPASTMEDICALHX_GEN_ALL_CORE_FT
PAST MEDICAL HISTORY:  Fibroid uterus     GERD (gastroesophageal reflux disease)     History of TIAs     Hyperlipemia     Hypertension     OA (osteoarthritis)

## 2020-10-26 NOTE — H&P PST ADULT - NEGATIVE FEMALE-SPECIFIC SYMPTOMS
no dysmenorrhea/no menorrhagia/no amenorrhea/no spotting/no abnormal vaginal bleeding/no irregular menses/no vaginal discharge

## 2020-10-26 NOTE — H&P PST ADULT - ASSESSMENT
DX: abnormal weight loss and evaluated for a scheduled exploratory laparotomy, Total laparoscopic hysterectomy, BSO, poss. staging, debulking on 11/2/20.

## 2020-10-26 NOTE — H&P PST ADULT - NEGATIVE ENMT SYMPTOMS
no recurrent cold sores/no abnormal taste sensation/no post-nasal discharge/no ear pain/no gum bleeding/no throat pain/no dysphagia/no tinnitus/no nasal obstruction/no nasal congestion/no nose bleeds/no dry mouth/no hearing difficulty/no sinus symptoms

## 2020-10-26 NOTE — H&P PST ADULT - NEGATIVE GENERAL GENITOURINARY SYMPTOMS
no flank pain R/no urine discoloration/no urinary hesitancy/no nocturia/no incontinence/no hematuria/no flank pain L/no bladder infections/no dysuria/no renal colic

## 2020-10-26 NOTE — H&P PST ADULT - HISTORY OF PRESENT ILLNESS
59 y/o F presents to PST w/ a preop dx of abnormal weight loss and to be evaluated for a scheduled exploratory laparotomy, Total laparoscopic hysterectomy, BSO, poss. staging, debulking on 11/2/20.  Pt states " A tumor noted on my pelvis and monitored, told not need to intervene then but now it is pressing on my bladder, increasing in size and 25 lbs wt loss noted as well so I was recommended surgical intervention". States hx of fibroids (past and present) and is S/P myomectomy in the past and embolization in the past.

## 2020-10-26 NOTE — H&P PST ADULT - ATTENDING COMMENTS
Patient seen in presurgical holding area for the informed consent discussion; R/B/A were reviewed & understood with consent signed & witnessed in the chart.    Laila Cardoza MD

## 2020-10-26 NOTE — H&P PST ADULT - VISION (WITH CORRECTIVE LENSES IF THE PATIENT USUALLY WEARS THEM):
glasses prn/Partially impaired: cannot see medication labels or newsprint, but can see obstacles in path, and the surrounding layout; can count fingers at arm's length

## 2020-10-26 NOTE — H&P PST ADULT - NSANTHOSAYNRD_GEN_A_CORE
No. HOPE screening performed.  STOP BANG Legend: 0-2 = LOW Risk; 3-4 = INTERMEDIATE Risk; 5-8 = HIGH Risk/Denies sleep studies done in the past

## 2020-10-26 NOTE — H&P PST ADULT - NEGATIVE GASTROINTESTINAL SYMPTOMS
no nausea/no melena/no vomiting/no hematochezia/no hiccoughs/no jaundice/no constipation/no change in bowel habits/no diarrhea/no flatulence/no steatorrhea

## 2020-10-29 DIAGNOSIS — Z01.818 ENCOUNTER FOR OTHER PREPROCEDURAL EXAMINATION: ICD-10-CM

## 2020-10-30 ENCOUNTER — APPOINTMENT (OUTPATIENT)
Dept: DISASTER EMERGENCY | Facility: CLINIC | Age: 60
End: 2020-10-30

## 2020-10-30 RX ORDER — METRONIDAZOLE 500 MG
1 TABLET ORAL
Qty: 3 | Refills: 0
Start: 2020-10-30

## 2020-10-30 RX ORDER — NEOMYCIN SULFATE 500 MG/1
2 TABLET ORAL
Qty: 6 | Refills: 0
Start: 2020-10-30

## 2020-10-30 NOTE — ASU PATIENT PROFILE, ADULT - PMH
Fibroid uterus    GERD (gastroesophageal reflux disease)    History of TIAs    Hyperlipemia    Hypertension    OA (osteoarthritis)

## 2020-10-30 NOTE — ASU PATIENT PROFILE, ADULT - MEDICATION HERBAL REMEDIES, PROFILE
Left message for team member to call back:    Possible exposure to:  [place]  Room 8122e1 starting on 6/17 at 1049  Exposure concern:  6/20 @0000- 6/22 @ 1524  Covid results positive: on 6/23 at 0705  Isolation ordered: on 6/22 at 1524       no

## 2020-10-31 LAB — SARS-COV-2 N GENE NPH QL NAA+PROBE: NOT DETECTED

## 2020-11-01 ENCOUNTER — TRANSCRIPTION ENCOUNTER (OUTPATIENT)
Age: 60
End: 2020-11-01

## 2020-11-02 ENCOUNTER — INPATIENT (INPATIENT)
Facility: HOSPITAL | Age: 60
LOS: 3 days | Discharge: HOME | End: 2020-11-06
Attending: OBSTETRICS & GYNECOLOGY | Admitting: OBSTETRICS & GYNECOLOGY
Payer: COMMERCIAL

## 2020-11-02 ENCOUNTER — APPOINTMENT (OUTPATIENT)
Dept: GYNECOLOGIC ONCOLOGY | Facility: HOSPITAL | Age: 60
End: 2020-11-02

## 2020-11-02 ENCOUNTER — RESULT REVIEW (OUTPATIENT)
Age: 60
End: 2020-11-02

## 2020-11-02 VITALS
DIASTOLIC BLOOD PRESSURE: 75 MMHG | HEIGHT: 61 IN | OXYGEN SATURATION: 98 % | HEART RATE: 72 BPM | SYSTOLIC BLOOD PRESSURE: 140 MMHG | WEIGHT: 108.91 LBS | TEMPERATURE: 99 F | RESPIRATION RATE: 16 BRPM

## 2020-11-02 DIAGNOSIS — R63.4 ABNORMAL WEIGHT LOSS: ICD-10-CM

## 2020-11-02 DIAGNOSIS — Z98.890 OTHER SPECIFIED POSTPROCEDURAL STATES: Chronic | ICD-10-CM

## 2020-11-02 PROBLEM — M19.90 UNSPECIFIED OSTEOARTHRITIS, UNSPECIFIED SITE: Chronic | Status: ACTIVE | Noted: 2020-10-26

## 2020-11-02 PROBLEM — D25.9 LEIOMYOMA OF UTERUS, UNSPECIFIED: Chronic | Status: ACTIVE | Noted: 2020-10-26

## 2020-11-02 PROCEDURE — 88305 TISSUE EXAM BY PATHOLOGIST: CPT | Mod: 26

## 2020-11-02 PROCEDURE — 88307 TISSUE EXAM BY PATHOLOGIST: CPT | Mod: 26

## 2020-11-02 PROCEDURE — 88331 PATH CONSLTJ SURG 1 BLK 1SPC: CPT | Mod: 26

## 2020-11-02 PROCEDURE — 58150 TOTAL HYSTERECTOMY: CPT | Mod: 22,GC

## 2020-11-02 RX ORDER — METOPROLOL TARTRATE 50 MG
5 TABLET ORAL EVERY 6 HOURS
Refills: 0 | Status: DISCONTINUED | OUTPATIENT
Start: 2020-11-02 | End: 2020-11-04

## 2020-11-02 RX ORDER — HEPARIN SODIUM 5000 [USP'U]/ML
5000 INJECTION INTRAVENOUS; SUBCUTANEOUS EVERY 8 HOURS
Refills: 0 | Status: DISCONTINUED | OUTPATIENT
Start: 2020-11-02 | End: 2020-11-03

## 2020-11-02 RX ORDER — NALOXONE HYDROCHLORIDE 4 MG/.1ML
0.1 SPRAY NASAL
Refills: 0 | Status: DISCONTINUED | OUTPATIENT
Start: 2020-11-02 | End: 2020-11-03

## 2020-11-02 RX ORDER — ONDANSETRON 8 MG/1
4 TABLET, FILM COATED ORAL EVERY 6 HOURS
Refills: 0 | Status: DISCONTINUED | OUTPATIENT
Start: 2020-11-02 | End: 2020-11-02

## 2020-11-02 RX ORDER — ACETAMINOPHEN 500 MG
1000 TABLET ORAL EVERY 6 HOURS
Refills: 0 | Status: DISCONTINUED | OUTPATIENT
Start: 2020-11-02 | End: 2020-11-03

## 2020-11-02 RX ORDER — ONDANSETRON 8 MG/1
4 TABLET, FILM COATED ORAL EVERY 6 HOURS
Refills: 0 | Status: DISCONTINUED | OUTPATIENT
Start: 2020-11-02 | End: 2020-11-03

## 2020-11-02 RX ORDER — FAMOTIDINE 10 MG/ML
20 INJECTION INTRAVENOUS DAILY
Refills: 0 | Status: DISCONTINUED | OUTPATIENT
Start: 2020-11-02 | End: 2020-11-06

## 2020-11-02 RX ORDER — ACETAMINOPHEN 500 MG
1000 TABLET ORAL ONCE
Refills: 0 | Status: COMPLETED | OUTPATIENT
Start: 2020-11-02 | End: 2020-11-02

## 2020-11-02 RX ORDER — KETOROLAC TROMETHAMINE 30 MG/ML
30 SYRINGE (ML) INJECTION EVERY 8 HOURS
Refills: 0 | Status: DISCONTINUED | OUTPATIENT
Start: 2020-11-02 | End: 2020-11-03

## 2020-11-02 RX ORDER — SODIUM CHLORIDE 9 MG/ML
1000 INJECTION, SOLUTION INTRAVENOUS
Refills: 0 | Status: DISCONTINUED | OUTPATIENT
Start: 2020-11-02 | End: 2020-11-03

## 2020-11-02 RX ORDER — HYDROMORPHONE HYDROCHLORIDE 2 MG/ML
30 INJECTION INTRAMUSCULAR; INTRAVENOUS; SUBCUTANEOUS
Refills: 0 | Status: DISCONTINUED | OUTPATIENT
Start: 2020-11-02 | End: 2020-11-03

## 2020-11-02 RX ADMIN — HEPARIN SODIUM 5000 UNIT(S): 5000 INJECTION INTRAVENOUS; SUBCUTANEOUS at 14:57

## 2020-11-02 RX ADMIN — Medication 30 MILLIGRAM(S): at 19:13

## 2020-11-02 RX ADMIN — SODIUM CHLORIDE 90 MILLILITER(S): 9 INJECTION, SOLUTION INTRAVENOUS at 12:00

## 2020-11-02 RX ADMIN — HEPARIN SODIUM 5000 UNIT(S): 5000 INJECTION INTRAVENOUS; SUBCUTANEOUS at 21:10

## 2020-11-02 RX ADMIN — Medication 400 MILLIGRAM(S): at 16:31

## 2020-11-02 RX ADMIN — Medication 1000 MILLIGRAM(S): at 17:00

## 2020-11-02 RX ADMIN — HYDROMORPHONE HYDROCHLORIDE 30 MILLILITER(S): 2 INJECTION INTRAMUSCULAR; INTRAVENOUS; SUBCUTANEOUS at 19:06

## 2020-11-02 RX ADMIN — SODIUM CHLORIDE 90 MILLILITER(S): 9 INJECTION, SOLUTION INTRAVENOUS at 23:52

## 2020-11-02 RX ADMIN — Medication 1000 MILLIGRAM(S): at 05:46

## 2020-11-02 RX ADMIN — FAMOTIDINE 20 MILLIGRAM(S): 10 INJECTION INTRAVENOUS at 17:29

## 2020-11-02 RX ADMIN — SODIUM CHLORIDE 90 MILLILITER(S): 9 INJECTION, SOLUTION INTRAVENOUS at 14:52

## 2020-11-02 RX ADMIN — Medication 30 MILLIGRAM(S): at 18:43

## 2020-11-02 RX ADMIN — Medication 400 MILLIGRAM(S): at 23:41

## 2020-11-02 RX ADMIN — HYDROMORPHONE HYDROCHLORIDE 30 MILLILITER(S): 2 INJECTION INTRAMUSCULAR; INTRAVENOUS; SUBCUTANEOUS at 14:52

## 2020-11-02 RX ADMIN — HYDROMORPHONE HYDROCHLORIDE 30 MILLILITER(S): 2 INJECTION INTRAMUSCULAR; INTRAVENOUS; SUBCUTANEOUS at 12:30

## 2020-11-02 NOTE — PROGRESS NOTE ADULT - SUBJECTIVE AND OBJECTIVE BOX
PA GynOnc Post Op Note    Pt seen and examined at bedside. Pt transferred to her room. Pt resting comfortably.  Pt denies fever, chills, chest pain, SOB, nausea, vomiting, lightheadedness, dizziness.  Romero catheter in place.      T(F): 97.8 (11-02-20 @ 14:59), Max: 99.3 (11-02-20 @ 05:37)  HR: 81 (11-02-20 @ 14:59) (58 - 85)  BP: 122/74 (11-02-20 @ 14:59) (119/68 - 140/75)  RR: 16 (11-02-20 @ 14:59) (12 - 16)  SpO2: 99% (11-02-20 @ 14:59) (96% - 100%)  Wt(kg): --  I&O's Detail    02 Nov 2020 07:01  -  02 Nov 2020 15:02  --------------------------------------------------------  IN:    dextrose 5% + sodium chloride 0.45%: 180 mL    Oral Fluid: 120 mL  Total IN: 300 mL    OUT:    Indwelling Catheter - Urethral (mL): 235 mL  Total OUT: 235 mL    Total NET: 65 mL    Physical Exam:  Constitutional: WDWN female, NAD AxOx3  Skin: warm and dry, no breakdowns noted  Chest: s1s2+, RRR, clear to auscultation bilaterally, no w/r/r    Abdomen: soft, nondistended, no guarding, no rebound, hypoactive bowel sounds,  Appropriate tenderness noted   Incisional site:    Extremities: no lower extremity edema or calf tenderness bilaterally; intermittent compression stockings in place     a/p: This 60y female, s/p STERLING, BSO, FADUMO, Frozen=benign, pt stable    CV: hemodynamically stable  PUL: adequate on RA  GI:   : Romero in place,   ID: afebrile, WBC stable,   DVT prophylaxis: SQ Heparin  Pain Management:   continue IV Fluids  d/w gyn onc team    Katarina Alba, PAC  #[id number] spectra PA GynOn Post Op Note    Pt seen and examined at bedside. Pt transferred to her room. Pt resting comfortably.  Pt denies fever, chills, chest pain, SOB, nausea, vomiting, lightheadedness, dizziness.  Romero catheter in place.  PCA in hand    T(F): 97.8 (11-02-20 @ 14:59), Max: 99.3 (11-02-20 @ 05:37)  HR: 81 (11-02-20 @ 14:59) (58 - 85)  BP: 122/74 (11-02-20 @ 14:59) (119/68 - 140/75)  RR: 16 (11-02-20 @ 14:59) (12 - 16)  SpO2: 99% (11-02-20 @ 14:59) (96% - 100%)  Wt(kg): --  I&O's Detail    02 Nov 2020 07:01  -  02 Nov 2020 15:02  --------------------------------------------------------  IN:    dextrose 5% + sodium chloride 0.45%: 180 mL    Oral Fluid: 120 mL  Total IN: 300 mL    OUT:    Indwelling Catheter - Urethral (mL): 235 mL  Total OUT: 235 mL    Total NET: 65 mL    Physical Exam:  Constitutional: WDWN female, NAD AxOx3  Skin: warm and dry, no breakdowns noted  Chest: s1s2+, RRR, clear to auscultation bilaterally, no w/r/r    Abdomen: soft, nondistended, no guarding, no rebound, hypoactive bowel sounds,  Appropriate tenderness noted   Incisional site: vertical incision clean, dry with dressing intact. Abdominal binder in place  Extremities: no lower extremity edema or calf tenderness bilaterally; intermittent compression stockings in place     a/p: This 60y female, s/p STERLING, BSO, FADUMO, Frozen=benign, pt stable. EBL: 200cc    CV: hemodynamically stable, IV Lopressor prn   PUL: adequate on RA, encourage Incentive spirometry  GI: tolerating clear fluids, Pepcid to start 11/3  : Romero in place with adequate output, urine clear  ID: afebrile, WBC stable,   DVT prophylaxis: SQ Heparin  Pain Management: PCA in place, Tylenol and Toradol prn  continue IV Fluids  labs ordered for 11/3 am  OOB as tolerated,   d/w gyn onc team    PAT Ruiz  #[id number] spectra

## 2020-11-02 NOTE — BRIEF OPERATIVE NOTE - NSICDXBRIEFPROCEDURE_GEN_ALL_CORE_FT
PROCEDURES:  Lysis of adhesions, pelvic 02-Nov-2020 12:13:53  Madeline Rojas  Hysterectomy, total, abdominal, with BSO 02-Nov-2020 12:13:24  Madeline Rojas

## 2020-11-02 NOTE — BRIEF OPERATIVE NOTE - OPERATION/FINDINGS
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

## 2020-11-03 ENCOUNTER — TRANSCRIPTION ENCOUNTER (OUTPATIENT)
Age: 60
End: 2020-11-03

## 2020-11-03 DIAGNOSIS — D25.9 LEIOMYOMA OF UTERUS, UNSPECIFIED: ICD-10-CM

## 2020-11-03 DIAGNOSIS — I10 ESSENTIAL (PRIMARY) HYPERTENSION: ICD-10-CM

## 2020-11-03 LAB
ANION GAP SERPL CALC-SCNC: 9 MMO/L — SIGNIFICANT CHANGE UP (ref 7–14)
BASOPHILS # BLD AUTO: 0.03 K/UL — SIGNIFICANT CHANGE UP (ref 0–0.2)
BASOPHILS NFR BLD AUTO: 0.4 % — SIGNIFICANT CHANGE UP (ref 0–2)
BLD GP AB SCN SERPL QL: NEGATIVE — SIGNIFICANT CHANGE UP
BUN SERPL-MCNC: 5 MG/DL — LOW (ref 7–23)
CALCIUM SERPL-MCNC: 8.4 MG/DL — SIGNIFICANT CHANGE UP (ref 8.4–10.5)
CHLORIDE SERPL-SCNC: 99 MMOL/L — SIGNIFICANT CHANGE UP (ref 98–107)
CO2 SERPL-SCNC: 28 MMOL/L — SIGNIFICANT CHANGE UP (ref 22–31)
CREAT SERPL-MCNC: 0.7 MG/DL — SIGNIFICANT CHANGE UP (ref 0.5–1.3)
EOSINOPHIL # BLD AUTO: 0 K/UL — SIGNIFICANT CHANGE UP (ref 0–0.5)
EOSINOPHIL NFR BLD AUTO: 0 % — SIGNIFICANT CHANGE UP (ref 0–6)
GLUCOSE BLDC GLUCOMTR-MCNC: 116 MG/DL — HIGH (ref 70–99)
GLUCOSE SERPL-MCNC: 123 MG/DL — HIGH (ref 70–99)
HCT VFR BLD CALC: 38.8 % — SIGNIFICANT CHANGE UP (ref 34.5–45)
HGB BLD-MCNC: 12.8 G/DL — SIGNIFICANT CHANGE UP (ref 11.5–15.5)
IMM GRANULOCYTES NFR BLD AUTO: 0.3 % — SIGNIFICANT CHANGE UP (ref 0–1.5)
LYMPHOCYTES # BLD AUTO: 1.12 K/UL — SIGNIFICANT CHANGE UP (ref 1–3.3)
LYMPHOCYTES # BLD AUTO: 14 % — SIGNIFICANT CHANGE UP (ref 13–44)
MAGNESIUM SERPL-MCNC: 1.5 MG/DL — LOW (ref 1.6–2.6)
MCHC RBC-ENTMCNC: 29.4 PG — SIGNIFICANT CHANGE UP (ref 27–34)
MCHC RBC-ENTMCNC: 33 % — SIGNIFICANT CHANGE UP (ref 32–36)
MCV RBC AUTO: 89.2 FL — SIGNIFICANT CHANGE UP (ref 80–100)
MONOCYTES # BLD AUTO: 0.89 K/UL — SIGNIFICANT CHANGE UP (ref 0–0.9)
MONOCYTES NFR BLD AUTO: 11.1 % — SIGNIFICANT CHANGE UP (ref 2–14)
NEUTROPHILS # BLD AUTO: 5.93 K/UL — SIGNIFICANT CHANGE UP (ref 1.8–7.4)
NEUTROPHILS NFR BLD AUTO: 74.2 % — SIGNIFICANT CHANGE UP (ref 43–77)
NRBC # FLD: 0 K/UL — SIGNIFICANT CHANGE UP (ref 0–0)
PHOSPHATE SERPL-MCNC: 2.9 MG/DL — SIGNIFICANT CHANGE UP (ref 2.5–4.5)
PLATELET # BLD AUTO: 213 K/UL — SIGNIFICANT CHANGE UP (ref 150–400)
PMV BLD: 10.9 FL — SIGNIFICANT CHANGE UP (ref 7–13)
POTASSIUM SERPL-MCNC: 3.1 MMOL/L — LOW (ref 3.5–5.3)
POTASSIUM SERPL-SCNC: 3.1 MMOL/L — LOW (ref 3.5–5.3)
RBC # BLD: 4.35 M/UL — SIGNIFICANT CHANGE UP (ref 3.8–5.2)
RBC # FLD: 12.5 % — SIGNIFICANT CHANGE UP (ref 10.3–14.5)
RH IG SCN BLD-IMP: POSITIVE — SIGNIFICANT CHANGE UP
SODIUM SERPL-SCNC: 136 MMOL/L — SIGNIFICANT CHANGE UP (ref 135–145)
WBC # BLD: 7.99 K/UL — SIGNIFICANT CHANGE UP (ref 3.8–10.5)
WBC # FLD AUTO: 7.99 K/UL — SIGNIFICANT CHANGE UP (ref 3.8–10.5)

## 2020-11-03 RX ORDER — SIMETHICONE 80 MG/1
80 TABLET, CHEWABLE ORAL EVERY 6 HOURS
Refills: 0 | Status: DISCONTINUED | OUTPATIENT
Start: 2020-11-03 | End: 2020-11-06

## 2020-11-03 RX ORDER — IBUPROFEN 200 MG
600 TABLET ORAL EVERY 6 HOURS
Refills: 0 | Status: DISCONTINUED | OUTPATIENT
Start: 2020-11-03 | End: 2020-11-04

## 2020-11-03 RX ORDER — OXYCODONE HYDROCHLORIDE 5 MG/1
1 TABLET ORAL
Qty: 5 | Refills: 0
Start: 2020-11-03

## 2020-11-03 RX ORDER — MAGNESIUM SULFATE 500 MG/ML
2 VIAL (ML) INJECTION ONCE
Refills: 0 | Status: COMPLETED | OUTPATIENT
Start: 2020-11-03 | End: 2020-11-03

## 2020-11-03 RX ORDER — OXYCODONE HYDROCHLORIDE 5 MG/1
10 TABLET ORAL
Refills: 0 | Status: DISCONTINUED | OUTPATIENT
Start: 2020-11-03 | End: 2020-11-06

## 2020-11-03 RX ORDER — SODIUM CHLORIDE 9 MG/ML
3 INJECTION INTRAMUSCULAR; INTRAVENOUS; SUBCUTANEOUS EVERY 8 HOURS
Refills: 0 | Status: DISCONTINUED | OUTPATIENT
Start: 2020-11-03 | End: 2020-11-06

## 2020-11-03 RX ORDER — ACETAMINOPHEN 500 MG
3 TABLET ORAL
Qty: 0 | Refills: 0 | DISCHARGE
Start: 2020-11-03

## 2020-11-03 RX ORDER — OXYCODONE HYDROCHLORIDE 5 MG/1
5 TABLET ORAL
Refills: 0 | Status: DISCONTINUED | OUTPATIENT
Start: 2020-11-03 | End: 2020-11-06

## 2020-11-03 RX ORDER — ENOXAPARIN SODIUM 100 MG/ML
40 INJECTION SUBCUTANEOUS DAILY
Refills: 0 | Status: DISCONTINUED | OUTPATIENT
Start: 2020-11-03 | End: 2020-11-05

## 2020-11-03 RX ORDER — IBUPROFEN 200 MG
1 TABLET ORAL
Qty: 0 | Refills: 0 | DISCHARGE
Start: 2020-11-03

## 2020-11-03 RX ORDER — ACETAMINOPHEN 500 MG
975 TABLET ORAL EVERY 6 HOURS
Refills: 0 | Status: DISCONTINUED | OUTPATIENT
Start: 2020-11-03 | End: 2020-11-06

## 2020-11-03 RX ORDER — POTASSIUM CHLORIDE 20 MEQ
10 PACKET (EA) ORAL
Refills: 0 | Status: COMPLETED | OUTPATIENT
Start: 2020-11-03 | End: 2020-11-03

## 2020-11-03 RX ORDER — PANTOPRAZOLE SODIUM 20 MG/1
40 TABLET, DELAYED RELEASE ORAL
Refills: 0 | Status: DISCONTINUED | OUTPATIENT
Start: 2020-11-03 | End: 2020-11-06

## 2020-11-03 RX ADMIN — SODIUM CHLORIDE 3 MILLILITER(S): 9 INJECTION INTRAMUSCULAR; INTRAVENOUS; SUBCUTANEOUS at 22:38

## 2020-11-03 RX ADMIN — Medication 1000 MILLIGRAM(S): at 00:11

## 2020-11-03 RX ADMIN — SIMETHICONE 80 MILLIGRAM(S): 80 TABLET, CHEWABLE ORAL at 17:22

## 2020-11-03 RX ADMIN — HYDROMORPHONE HYDROCHLORIDE 30 MILLILITER(S): 2 INJECTION INTRAMUSCULAR; INTRAVENOUS; SUBCUTANEOUS at 07:19

## 2020-11-03 RX ADMIN — Medication 100 MILLIEQUIVALENT(S): at 08:31

## 2020-11-03 RX ADMIN — Medication 30 MILLIGRAM(S): at 04:14

## 2020-11-03 RX ADMIN — Medication 400 MILLIGRAM(S): at 11:15

## 2020-11-03 RX ADMIN — OXYCODONE HYDROCHLORIDE 10 MILLIGRAM(S): 5 TABLET ORAL at 11:14

## 2020-11-03 RX ADMIN — OXYCODONE HYDROCHLORIDE 10 MILLIGRAM(S): 5 TABLET ORAL at 23:15

## 2020-11-03 RX ADMIN — PANTOPRAZOLE SODIUM 40 MILLIGRAM(S): 20 TABLET, DELAYED RELEASE ORAL at 11:15

## 2020-11-03 RX ADMIN — ENOXAPARIN SODIUM 40 MILLIGRAM(S): 100 INJECTION SUBCUTANEOUS at 15:51

## 2020-11-03 RX ADMIN — Medication 975 MILLIGRAM(S): at 17:22

## 2020-11-03 RX ADMIN — SIMETHICONE 80 MILLIGRAM(S): 80 TABLET, CHEWABLE ORAL at 11:14

## 2020-11-03 RX ADMIN — Medication 30 MILLIGRAM(S): at 03:44

## 2020-11-03 RX ADMIN — Medication 100 MILLIEQUIVALENT(S): at 11:16

## 2020-11-03 RX ADMIN — Medication 100 MILLIEQUIVALENT(S): at 15:50

## 2020-11-03 RX ADMIN — Medication 50 GRAM(S): at 11:15

## 2020-11-03 RX ADMIN — SODIUM CHLORIDE 90 MILLILITER(S): 9 INJECTION, SOLUTION INTRAVENOUS at 08:31

## 2020-11-03 RX ADMIN — Medication 400 MILLIGRAM(S): at 05:16

## 2020-11-03 RX ADMIN — HEPARIN SODIUM 5000 UNIT(S): 5000 INJECTION INTRAVENOUS; SUBCUTANEOUS at 05:17

## 2020-11-03 RX ADMIN — OXYCODONE HYDROCHLORIDE 10 MILLIGRAM(S): 5 TABLET ORAL at 22:46

## 2020-11-03 RX ADMIN — OXYCODONE HYDROCHLORIDE 10 MILLIGRAM(S): 5 TABLET ORAL at 11:43

## 2020-11-03 NOTE — PROGRESS NOTE ADULT - SUBJECTIVE AND OBJECTIVE BOX
Anesthesia Pain Management Service- Attending Addendum    SUBJECTIVE: Patient's pain control adequate    Therapy:	  [ X] IV PCA	   [ ] Epidural           [ ] s/p Spinal Opoid              [ ] Postpartum infusion	  [ ] Patient controlled regional anesthesia (PCRA)    [ ] prn Analgesics    Allergies    No Known Allergies    Intolerances      MEDICATIONS  (STANDING):  acetaminophen  IVPB .. 1000 milliGRAM(s) IV Intermittent every 6 hours  enoxaparin Injectable 40 milliGRAM(s) SubCutaneous daily  famotidine    Tablet 20 milliGRAM(s) Oral daily  metoprolol tartrate Injectable 5 milliGRAM(s) IV Push every 6 hours  pantoprazole    Tablet 40 milliGRAM(s) Oral before breakfast  potassium chloride  10 mEq/100 mL IVPB 10 milliEquivalent(s) IV Intermittent every 1 hour  simethicone 80 milliGRAM(s) Chew every 6 hours  sodium chloride 0.9% lock flush 3 milliLiter(s) IV Push every 8 hours    MEDICATIONS  (PRN):  ketorolac   Injectable 30 milliGRAM(s) IV Push every 8 hours PRN Moderate Pain (4 - 6)  oxyCODONE    IR 5 milliGRAM(s) Oral every 3 hours PRN Moderate Pain (4 - 6)  oxyCODONE    IR 10 milliGRAM(s) Oral every 3 hours PRN Severe Pain (7 - 10)      OBJECTIVE:   [X] No new signs     [ ] Other:    Side Effects:  [X ] None			[ ] Other:      ASSESSMENT/PLAN  -Discontinue current therapy    [ ] Therapy changed to:    [ ] IV PCA       [ ] Epidural     [ X] prn Analgesics     Comments: Pain management per primary team, APS to sign off

## 2020-11-03 NOTE — DISCHARGE NOTE PROVIDER - HOSPITAL COURSE
Patient underwent an ex lap, STERLING, BSO, FADUMO for a pelvic mass. Patient’s postoperative course was unremarkable and she remained hemodynamically stable and afebrile throughout. Upon discharge on POD#_, the patient is ambulating and voiding spontaneously, tolerating oral intake, pain was well controlled with oral medication, and vital signs were stable. Patient underwent an ex lap, STERLING, BSO, FADUMO for a pelvic mass. Overnight on POD2, pt became hypotensive to 83/23 and diaphoretic when sitting up in bed and a rapid response was called. Event was likely vasovagal from pain. Following this event, pts vitals have been stable. Upon discharge on POD#_, the patient is ambulating and voiding spontaneously, tolerating oral intake, pain was well controlled with oral medication, and vital signs were stable. Patient underwent an ex lap, STERLING, BSO, FADUMO for a pelvic mass. Overnight on POD2, pt became hypotensive to 83/23 and diaphoretic when sitting up in bed and a rapid response was called. Event was likely vasovagal from pain. Following this event, pts vitals have been stable. On POD3, pts Hct noted to be 22.9 from 31.6 (repeat 23.9). Pt was asymptomatic, started on vitamin C & iron. On POD4 pts hct & vitals remained stable. Upon discharge on POD#_, the patient is ambulating and voiding spontaneously, tolerating oral intake, pain was well controlled with oral medication, and vital signs were stable.

## 2020-11-03 NOTE — PROGRESS NOTE ADULT - PROBLEM SELECTOR PLAN 2
see above Fellow Note    Patient seen and examined. Agree with above.    VS reviewed  Labs reviewed. BMP pending    Reg diet  OOB  VTE ppx  dc espinoza  Transition PO analgesia    David VILLARREAL

## 2020-11-03 NOTE — PROVIDER CONTACT NOTE (OTHER) - ASSESSMENT
Pt is alert and orientedx4. c/o dizziness after ambulating therefore vitals were taken and document in sunrise.

## 2020-11-03 NOTE — DISCHARGE NOTE PROVIDER - NSDCFUADDINST_GEN_ALL_CORE_FT
Return to your regular way of eating.  Resume normal activity as tolerated, but no heavy lifting or strenuous activity for 2 weeks.  No driving for next 2 weeks and/or while on narcotic pain medication.  Complete vaginal rest, no tampons, no douching, no tub bathing, no sexual activities for 2 weeks unless otherwise instructed by your doctor.  Call your doctor with any signs and symptoms of infection such as fever, chills, nausea or vomiting.  Call your doctor with redness or swelling at the incision site, fluid leakage or wound separation.  Call your doctor if you're unable to tolerate food or have difficulty urinating.  Call your doctor if you have pain that is not relieved by your prescribed medications.  Notify your doctor with any other concerns.  Follow up with Dr. Cardoza in 2 weeks.

## 2020-11-03 NOTE — DISCHARGE NOTE PROVIDER - NSDCMRMEDTOKEN_GEN_ALL_CORE_FT
acetaminophen 325 mg oral tablet: 3 tab(s) orally every 6 hours  Crestor 5 mg oral tablet: 1 tab(s) orally once a day (at bedtime)  hydroCHLOROthiazide 25 mg oral tablet: 1 tab(s) orally once a day  ibuprofen 600 mg oral tablet: 1 tab(s) orally every 6 hours  latanoprost 0.005% ophthalmic solution: 1 drop(s) to both eyes once a day (in the evening)  metroNIDAZOLE 500 mg oral tablet: 1 tab(s) orally every 8 hours   neomycin 500 mg oral tablet: Please take in accordance with prescribed bowel prep instructions  oxyCODONE 5 mg oral capsule: 1 cap(s) orally once a day, As Needed -for severe pain MDD:2/day   pantoprazole 40 mg oral delayed release tablet: 1 tab(s) orally once a day

## 2020-11-03 NOTE — DISCHARGE NOTE PROVIDER - CARE PROVIDER_API CALL
Laila Cardoza)  Gynecologic Oncology; Obstetrics and Gynecology  [address] [place] [address]  Phone: [phone]  Fax: [phone]  Follow Up Time:    Laila Cardoza)  Gynecologic Oncology; Obstetrics and Gynecology  [address] [place] [address]  Phone: [phone]  Fax: [phone]  Scheduled Appointment: 11/18/2020

## 2020-11-03 NOTE — PROGRESS NOTE ADULT - SUBJECTIVE AND OBJECTIVE BOX
GYN ONC Progress Note    HD#  2          POD# 1    Interval/Overnight Events:   Pt seen at bedside.  No acute events overnight.  Denies fevers, chills, nausea, vomiting, SOB, CP, lightheadedness, dizziness, pain is controlled.  Pt not yet out of bed, tolerating PO clears, espinoza in situ.        Allergies: No Known Allergies      MEDICATIONS:   --------------------------------------------------------------------------------------  Neurologic Medications  acetaminophen  IVPB .. 1000 milliGRAM(s) IV Intermittent every 6 hours  HYDROmorphone PCA (1 mG/mL) 30 milliLiter(s) PCA Continuous PCA Continuous  ketorolac   Injectable 30 milliGRAM(s) IV Push every 8 hours PRN Moderate Pain (4 - 6)  ondansetron Injectable 4 milliGRAM(s) IV Push every 6 hours PRN Nausea    Respiratory Medications    Cardiovascular Medications  metoprolol tartrate Injectable 5 milliGRAM(s) IV Push every 6 hours    Gastrointestinal Medications  dextrose 5% + sodium chloride 0.45%. 1000 milliLiter(s) IV Continuous <Continuous>  famotidine    Tablet 20 milliGRAM(s) Oral daily    Genitourinary Medications    Hematologic/Oncologic Medications  heparin   Injectable 5000 Unit(s) SubCutaneous every 8 hours    Antimicrobial/Immunologic Medications    Endocrine/Metabolic Medications    Topical/Other Medications  naloxone Injectable 0.1 milliGRAM(s) IV Push every 3 minutes PRN For ANY of the following changes in patient status:  A. RR LESS THAN 10 breaths per minute, B. Oxygen saturation LESS THAN 90%, C. Sedation score of 6    --------------------------------------------------------------------------------------    VITAL SIGNS, INS/OUTS (last 24 hours):  --------------------------------------------------------------------------------------  ICU Vital Signs Last 24 Hrs  T(C): 36.7 (02 Nov 2020 23:44), Max: 36.9 (02 Nov 2020 21:04)  T(F): 98 (02 Nov 2020 23:44), Max: 98.5 (02 Nov 2020 21:04)  HR: 68 (02 Nov 2020 23:44) (58 - 85)  BP: 106/60 (02 Nov 2020 23:44) (106/60 - 140/69)  BP(mean): 83 (02 Nov 2020 14:18) (78 - 85)  RR: 16 (02 Nov 2020 23:44) (12 - 18)  SpO2: 95% (02 Nov 2020 23:44) (95% - 100%)    I&O's Detail    02 Nov 2020 07:01  -  03 Nov 2020 06:55  --------------------------------------------------------  IN:    dextrose 5% + sodium chloride 0.45%: 1170 mL    Oral Fluid: 120 mL  Total IN: 1290 mL    OUT:    Indwelling Catheter - Urethral (mL): 2035 mL  Total OUT: 2035 mL    Total NET: -745 mL        --------------------------------------------------------------------------------------    PHYSICAL EXAM:   Gen: A&Ox3, NAD  Cardio: nl S1/S2, RRR  Pulm: CTABL  Abd: +BS soft, non tender, non distended, no rebound or guarding  Incision: midline vertical incision clean, dry and intact w dressing  Espinoza: in situ w clear yellow urine  Extr: non tender, non edematous, non erythematous      LABS  --------------------------------------------------------------------------------------

## 2020-11-03 NOTE — CHART NOTE - NSCHARTNOTEFT_GEN_A_CORE
Code stroke called by accident, they meant to call RRT.  When I arrived they told me it was cancelled and it was an RRT. Code stroke called by accident, they meant to call RRT.  When I arrived they told me it was cancelled and it was an RRT.    Stroke attending. Agree with above

## 2020-11-03 NOTE — PROGRESS NOTE ADULT - SUBJECTIVE AND OBJECTIVE BOX
60y Female s/p exp lap [facility] BSO  under GETA  on 11/02/20     T(C): 37.2 (11-03-20 @ 08:44), Max: 37.2 (11-03-20 @ 08:44)  HR: 62 (11-03-20 @ 08:44) (58 - 85)  BP: 107/59 (11-03-20 @ 08:44) (106/60 - 140/69)  RR: 18 (11-03-20 @ 08:44) (12 - 18)  SpO2: 98% (11-03-20 @ 08:44) (95% - 100%)  Wt(kg): --    Pt seen, doing well, no anesthesia complications or complaints noted or reported.   No Nausea  Pain well controlled

## 2020-11-03 NOTE — PROGRESS NOTE ADULT - PROBLEM SELECTOR PLAN 1
Neuro: on PCA, will transition to po pain meds after gloria PO reg  CV: Hemodynamically stable, continue to monitor VS, f/u AM labs  hx of HTN, c/w lopressor PRN  Pulm: Saturating well on room air, encourage incentive spirometry  GI: Advance to regular diet as gloria; will start home PPI, c/w pepcid  : UOP adequate, d/c espinoza after AM labs, replete electrolytes PRN  Heme: c/w HSQ and SCDs for DVT ppx, switch to lovenox this AM  Dispo: Continue routine post-op care    MAYTE Rojas PGY4

## 2020-11-03 NOTE — PROGRESS NOTE ADULT - SUBJECTIVE AND OBJECTIVE BOX
Anesthesia Pain Management Service    SUBJECTIVE: Patient is doing well with IV PCA and no significant problems reported.    Pain Scale Score	At rest: _5__ 	With Activity: ___ 	[X ] Refer to charted pain scores    THERAPY:    [ ] IV PCA Morphine		[ ] 5 mg/mL	[ ] 1 mg/mL  [X ] IV PCA Hydromorphone	[ ] 5 mg/mL	[X ] 1 mg/mL  [ ] IV PCA Fentanyl		[ ] 50 micrograms/mL    Demand dose __0.2_ lockout __6_ (minutes) Continuous Rate _0__ Total: __3_   mg used (in past 24 hrs)      MEDICATIONS  (STANDING):  acetaminophen  IVPB .. 1000 milliGRAM(s) IV Intermittent every 6 hours  dextrose 5% + sodium chloride 0.45%. 1000 milliLiter(s) (90 mL/Hr) IV Continuous <Continuous>  enoxaparin Injectable 40 milliGRAM(s) SubCutaneous daily  famotidine    Tablet 20 milliGRAM(s) Oral daily  magnesium sulfate  IVPB 2 Gram(s) IV Intermittent once  metoprolol tartrate Injectable 5 milliGRAM(s) IV Push every 6 hours  pantoprazole    Tablet 40 milliGRAM(s) Oral before breakfast  potassium chloride  10 mEq/100 mL IVPB 10 milliEquivalent(s) IV Intermittent every 1 hour  simethicone 80 milliGRAM(s) Chew every 6 hours    MEDICATIONS  (PRN):  ketorolac   Injectable 30 milliGRAM(s) IV Push every 8 hours PRN Moderate Pain (4 - 6)  oxyCODONE    IR 5 milliGRAM(s) Oral every 3 hours PRN Moderate Pain (4 - 6)  oxyCODONE    IR 10 milliGRAM(s) Oral every 3 hours PRN Severe Pain (7 - 10)      OBJECTIVE: laying in bed     Sedation Score:	[ X] Alert	[ ] Drowsy 	[ ] Arousable	[ ] Asleep	[ ] Unresponsive    Side Effects:	[X ] None	[ ] Nausea	[ ] Vomiting	[ ] Pruritus  		[ ] Other:    Vital Signs Last 24 Hrs  T(C): 37.2 (03 Nov 2020 08:44), Max: 37.2 (03 Nov 2020 08:44)  T(F): 98.9 (03 Nov 2020 08:44), Max: 98.9 (03 Nov 2020 08:44)  HR: 62 (03 Nov 2020 08:44) (58 - 85)  BP: 107/59 (03 Nov 2020 08:44) (106/60 - 140/69)  BP(mean): 83 (02 Nov 2020 14:18) (78 - 85)  RR: 18 (03 Nov 2020 08:44) (12 - 18)  SpO2: 98% (03 Nov 2020 08:44) (95% - 100%)    ASSESSMENT/ PLAN    Therapy to  be:	[ ] Continue   [ X] Discontinued   [X ] Change to prn Analgesics    Documentation and Verification of current medications:   [X] Done	[ ] Not done, not elligible    Comments: PRN Oral/IV opioids and/or Adjuvant medication to be ordered at this point.    Progress Note written now but Patient was seen earlier.

## 2020-11-04 DIAGNOSIS — R19.00 INTRA-ABDOMINAL AND PELVIC SWELLING, MASS AND LUMP, UNSPECIFIED SITE: ICD-10-CM

## 2020-11-04 DIAGNOSIS — K21.9 GASTRO-ESOPHAGEAL REFLUX DISEASE WITHOUT ESOPHAGITIS: ICD-10-CM

## 2020-11-04 LAB
ALBUMIN SERPL ELPH-MCNC: 3.5 G/DL — SIGNIFICANT CHANGE UP (ref 3.3–5)
ALP SERPL-CCNC: 36 U/L — LOW (ref 40–120)
ALT FLD-CCNC: 12 U/L — SIGNIFICANT CHANGE UP (ref 4–33)
ANION GAP SERPL CALC-SCNC: 12 MMO/L — SIGNIFICANT CHANGE UP (ref 7–14)
ANION GAP SERPL CALC-SCNC: 7 MMO/L — SIGNIFICANT CHANGE UP (ref 7–14)
AST SERPL-CCNC: 24 U/L — SIGNIFICANT CHANGE UP (ref 4–32)
BASOPHILS # BLD AUTO: 0.05 K/UL — SIGNIFICANT CHANGE UP (ref 0–0.2)
BASOPHILS NFR BLD AUTO: 0.6 % — SIGNIFICANT CHANGE UP (ref 0–2)
BILIRUB SERPL-MCNC: 0.4 MG/DL — SIGNIFICANT CHANGE UP (ref 0.2–1.2)
BUN SERPL-MCNC: 6 MG/DL — LOW (ref 7–23)
BUN SERPL-MCNC: 8 MG/DL — SIGNIFICANT CHANGE UP (ref 7–23)
CALCIUM SERPL-MCNC: 8.2 MG/DL — LOW (ref 8.4–10.5)
CALCIUM SERPL-MCNC: 8.6 MG/DL — SIGNIFICANT CHANGE UP (ref 8.4–10.5)
CHLORIDE SERPL-SCNC: 103 MMOL/L — SIGNIFICANT CHANGE UP (ref 98–107)
CHLORIDE SERPL-SCNC: 104 MMOL/L — SIGNIFICANT CHANGE UP (ref 98–107)
CO2 SERPL-SCNC: 22 MMOL/L — SIGNIFICANT CHANGE UP (ref 22–31)
CO2 SERPL-SCNC: 29 MMOL/L — SIGNIFICANT CHANGE UP (ref 22–31)
CREAT SERPL-MCNC: 0.69 MG/DL — SIGNIFICANT CHANGE UP (ref 0.5–1.3)
CREAT SERPL-MCNC: 0.7 MG/DL — SIGNIFICANT CHANGE UP (ref 0.5–1.3)
EOSINOPHIL # BLD AUTO: 0.03 K/UL — SIGNIFICANT CHANGE UP (ref 0–0.5)
EOSINOPHIL NFR BLD AUTO: 0.4 % — SIGNIFICANT CHANGE UP (ref 0–6)
GLUCOSE SERPL-MCNC: 129 MG/DL — HIGH (ref 70–99)
GLUCOSE SERPL-MCNC: 144 MG/DL — HIGH (ref 70–99)
HCT VFR BLD CALC: 31.6 % — LOW (ref 34.5–45)
HCT VFR BLD CALC: 32.8 % — LOW (ref 34.5–45)
HGB BLD-MCNC: 10.2 G/DL — LOW (ref 11.5–15.5)
HGB BLD-MCNC: 10.8 G/DL — LOW (ref 11.5–15.5)
IMM GRANULOCYTES NFR BLD AUTO: 0.5 % — SIGNIFICANT CHANGE UP (ref 0–1.5)
LYMPHOCYTES # BLD AUTO: 2.45 K/UL — SIGNIFICANT CHANGE UP (ref 1–3.3)
LYMPHOCYTES # BLD AUTO: 29.4 % — SIGNIFICANT CHANGE UP (ref 13–44)
MAGNESIUM SERPL-MCNC: 2.1 MG/DL — SIGNIFICANT CHANGE UP (ref 1.6–2.6)
MAGNESIUM SERPL-MCNC: 2.2 MG/DL — SIGNIFICANT CHANGE UP (ref 1.6–2.6)
MCHC RBC-ENTMCNC: 29.5 PG — SIGNIFICANT CHANGE UP (ref 27–34)
MCHC RBC-ENTMCNC: 29.6 PG — SIGNIFICANT CHANGE UP (ref 27–34)
MCHC RBC-ENTMCNC: 32.3 % — SIGNIFICANT CHANGE UP (ref 32–36)
MCHC RBC-ENTMCNC: 32.9 % — SIGNIFICANT CHANGE UP (ref 32–36)
MCV RBC AUTO: 89.9 FL — SIGNIFICANT CHANGE UP (ref 80–100)
MCV RBC AUTO: 91.3 FL — SIGNIFICANT CHANGE UP (ref 80–100)
MONOCYTES # BLD AUTO: 0.74 K/UL — SIGNIFICANT CHANGE UP (ref 0–0.9)
MONOCYTES NFR BLD AUTO: 8.9 % — SIGNIFICANT CHANGE UP (ref 2–14)
NEUTROPHILS # BLD AUTO: 5.01 K/UL — SIGNIFICANT CHANGE UP (ref 1.8–7.4)
NEUTROPHILS NFR BLD AUTO: 60.2 % — SIGNIFICANT CHANGE UP (ref 43–77)
NRBC # FLD: 0 K/UL — SIGNIFICANT CHANGE UP (ref 0–0)
NRBC # FLD: 0 K/UL — SIGNIFICANT CHANGE UP (ref 0–0)
PHOSPHATE SERPL-MCNC: 1.6 MG/DL — LOW (ref 2.5–4.5)
PHOSPHATE SERPL-MCNC: 1.8 MG/DL — LOW (ref 2.5–4.5)
PLATELET # BLD AUTO: 201 K/UL — SIGNIFICANT CHANGE UP (ref 150–400)
PLATELET # BLD AUTO: 206 K/UL — SIGNIFICANT CHANGE UP (ref 150–400)
PMV BLD: 10.7 FL — SIGNIFICANT CHANGE UP (ref 7–13)
PMV BLD: 9.8 FL — SIGNIFICANT CHANGE UP (ref 7–13)
POTASSIUM SERPL-MCNC: 3 MMOL/L — LOW (ref 3.5–5.3)
POTASSIUM SERPL-MCNC: 3.5 MMOL/L — SIGNIFICANT CHANGE UP (ref 3.5–5.3)
POTASSIUM SERPL-SCNC: 3 MMOL/L — LOW (ref 3.5–5.3)
POTASSIUM SERPL-SCNC: 3.5 MMOL/L — SIGNIFICANT CHANGE UP (ref 3.5–5.3)
PROT SERPL-MCNC: 5.9 G/DL — LOW (ref 6–8.3)
RBC # BLD: 3.46 M/UL — LOW (ref 3.8–5.2)
RBC # BLD: 3.65 M/UL — LOW (ref 3.8–5.2)
RBC # FLD: 12.8 % — SIGNIFICANT CHANGE UP (ref 10.3–14.5)
RBC # FLD: 12.9 % — SIGNIFICANT CHANGE UP (ref 10.3–14.5)
SODIUM SERPL-SCNC: 138 MMOL/L — SIGNIFICANT CHANGE UP (ref 135–145)
SODIUM SERPL-SCNC: 139 MMOL/L — SIGNIFICANT CHANGE UP (ref 135–145)
WBC # BLD: 8.24 K/UL — SIGNIFICANT CHANGE UP (ref 3.8–10.5)
WBC # BLD: 8.32 K/UL — SIGNIFICANT CHANGE UP (ref 3.8–10.5)
WBC # FLD AUTO: 8.24 K/UL — SIGNIFICANT CHANGE UP (ref 3.8–10.5)
WBC # FLD AUTO: 8.32 K/UL — SIGNIFICANT CHANGE UP (ref 3.8–10.5)

## 2020-11-04 RX ORDER — SODIUM CHLORIDE 9 MG/ML
1000 INJECTION, SOLUTION INTRAVENOUS
Refills: 0 | Status: DISCONTINUED | OUTPATIENT
Start: 2020-11-04 | End: 2020-11-05

## 2020-11-04 RX ORDER — SODIUM,POTASSIUM PHOSPHATES 278-250MG
1 POWDER IN PACKET (EA) ORAL ONCE
Refills: 0 | Status: COMPLETED | OUTPATIENT
Start: 2020-11-04 | End: 2020-11-04

## 2020-11-04 RX ORDER — KETOROLAC TROMETHAMINE 30 MG/ML
15 SYRINGE (ML) INJECTION EVERY 6 HOURS
Refills: 0 | Status: DISCONTINUED | OUTPATIENT
Start: 2020-11-04 | End: 2020-11-05

## 2020-11-04 RX ADMIN — ENOXAPARIN SODIUM 40 MILLIGRAM(S): 100 INJECTION SUBCUTANEOUS at 11:14

## 2020-11-04 RX ADMIN — SIMETHICONE 80 MILLIGRAM(S): 80 TABLET, CHEWABLE ORAL at 11:15

## 2020-11-04 RX ADMIN — Medication 600 MILLIGRAM(S): at 00:42

## 2020-11-04 RX ADMIN — Medication 600 MILLIGRAM(S): at 05:05

## 2020-11-04 RX ADMIN — OXYCODONE HYDROCHLORIDE 10 MILLIGRAM(S): 5 TABLET ORAL at 01:52

## 2020-11-04 RX ADMIN — Medication 600 MILLIGRAM(S): at 18:20

## 2020-11-04 RX ADMIN — Medication 975 MILLIGRAM(S): at 00:12

## 2020-11-04 RX ADMIN — OXYCODONE HYDROCHLORIDE 10 MILLIGRAM(S): 5 TABLET ORAL at 02:22

## 2020-11-04 RX ADMIN — PANTOPRAZOLE SODIUM 40 MILLIGRAM(S): 20 TABLET, DELAYED RELEASE ORAL at 07:39

## 2020-11-04 RX ADMIN — SIMETHICONE 80 MILLIGRAM(S): 80 TABLET, CHEWABLE ORAL at 05:05

## 2020-11-04 RX ADMIN — SODIUM CHLORIDE 3 MILLILITER(S): 9 INJECTION INTRAMUSCULAR; INTRAVENOUS; SUBCUTANEOUS at 05:05

## 2020-11-04 RX ADMIN — SODIUM CHLORIDE 3 MILLILITER(S): 9 INJECTION INTRAMUSCULAR; INTRAVENOUS; SUBCUTANEOUS at 22:07

## 2020-11-04 RX ADMIN — SIMETHICONE 80 MILLIGRAM(S): 80 TABLET, CHEWABLE ORAL at 23:08

## 2020-11-04 RX ADMIN — Medication 975 MILLIGRAM(S): at 05:35

## 2020-11-04 RX ADMIN — SIMETHICONE 80 MILLIGRAM(S): 80 TABLET, CHEWABLE ORAL at 17:46

## 2020-11-04 RX ADMIN — Medication 975 MILLIGRAM(S): at 11:45

## 2020-11-04 RX ADMIN — Medication 600 MILLIGRAM(S): at 17:46

## 2020-11-04 RX ADMIN — Medication 975 MILLIGRAM(S): at 00:42

## 2020-11-04 RX ADMIN — Medication 600 MILLIGRAM(S): at 11:45

## 2020-11-04 RX ADMIN — SODIUM CHLORIDE 3 MILLILITER(S): 9 INJECTION INTRAMUSCULAR; INTRAVENOUS; SUBCUTANEOUS at 16:26

## 2020-11-04 RX ADMIN — Medication 1 TABLET(S): at 11:15

## 2020-11-04 RX ADMIN — Medication 600 MILLIGRAM(S): at 00:12

## 2020-11-04 RX ADMIN — Medication 975 MILLIGRAM(S): at 23:08

## 2020-11-04 RX ADMIN — Medication 600 MILLIGRAM(S): at 11:15

## 2020-11-04 RX ADMIN — Medication 975 MILLIGRAM(S): at 05:05

## 2020-11-04 RX ADMIN — SODIUM CHLORIDE 50 MILLILITER(S): 9 INJECTION, SOLUTION INTRAVENOUS at 19:58

## 2020-11-04 RX ADMIN — Medication 975 MILLIGRAM(S): at 11:15

## 2020-11-04 RX ADMIN — Medication 15 MILLIGRAM(S): at 23:08

## 2020-11-04 RX ADMIN — FAMOTIDINE 20 MILLIGRAM(S): 10 INJECTION INTRAVENOUS at 11:15

## 2020-11-04 RX ADMIN — Medication 600 MILLIGRAM(S): at 05:35

## 2020-11-04 RX ADMIN — SIMETHICONE 80 MILLIGRAM(S): 80 TABLET, CHEWABLE ORAL at 00:12

## 2020-11-04 NOTE — PROVIDER CONTACT NOTE (CHANGE IN STATUS NOTIFICATION) - SITUATION
Pt c/o abdominal tightness. Patient asked to sit at the edge of the bed, once she sat up the blood pressure dropped to 83/23. Pt then fell back and became unresponsive for less than a minute.

## 2020-11-04 NOTE — PROVIDER CONTACT NOTE (CHANGE IN STATUS NOTIFICATION) - BACKGROUND
Pt is admitted for hysterectomy and decreased appetite due to a fibroid uterus that needed to be removed.

## 2020-11-04 NOTE — CHART NOTE - NSCHARTNOTEFT_GEN_A_CORE
I presented to bedside with chief, SHERI Fleming following rapid response called on pt after she sat up in bed and her BP dropped to 83/23. Pt is due for pain medication, was endorsing gas pain, and was attempting to ambulate at the time of the episode. Per RN she was notably diaphoretic and was endorsing lightheadedness at the time as well. Pt reports those symptoms have resolved and denies any CP/SOB. Orthostatic BPs were 112/61 sitting (HR 70) and 106/55 (HR 71) standing. ECG was performed at the bedside and showed normal sinus rhythm. Physical exam unremarkable: incision site w/ dressing in place C/D/I, abdomen appropriately tender to palpation no rebound/guarding. Stat CBC drawn with H/H 10.8/32.8 from 12.8/38.8 @ 0500. Pt due for pain medication and had a seemingly vasovagal episode. Pt given simethicone for gas relief and analgesia for postop pain control. Will continue to monitor closely.     GynOnc fellow updated and aware of pt status.    DENTON Moore MD

## 2020-11-04 NOTE — PROGRESS NOTE ADULT - SUBJECTIVE AND OBJECTIVE BOX
GYN ONC Progress Note    HD#3            POD#2     Interval/Overnight Events:   Pt seen at bedside. Overnight, rapid response was called after pts BP dropped to 83/23 upon sitting up in bed. Pt was diaphoretic & endorsed lightheadedness. EKG showed normal sinus rhythm. Stat CBC drawn with H/H 10.8/32.8 from 12.8/38.8 in am. Likely due to a vasovagal episode due to pain. Pt's BPs have since been stable and pt now denies any lightheadedness. C/o acid reflux and gas pain. Denies fevers, chills, nausea, vomiting, SOB, CP, pain is well controlled.     Allergies: No Known Allergies      MEDICATIONS:   --------------------------------------------------------------------------------------  Neurologic Medications  acetaminophen   Tablet .. 975 milliGRAM(s) Oral every 6 hours  ibuprofen  Tablet. 600 milliGRAM(s) Oral every 6 hours  oxyCODONE    IR 5 milliGRAM(s) Oral every 3 hours PRN Moderate Pain (4 - 6)  oxyCODONE    IR 10 milliGRAM(s) Oral every 3 hours PRN Severe Pain (7 - 10)    Respiratory Medications    Cardiovascular Medications  metoprolol tartrate Injectable 5 milliGRAM(s) IV Push every 6 hours    Gastrointestinal Medications  famotidine    Tablet 20 milliGRAM(s) Oral daily  pantoprazole    Tablet 40 milliGRAM(s) Oral before breakfast  simethicone 80 milliGRAM(s) Chew every 6 hours  sodium chloride 0.9% lock flush 3 milliLiter(s) IV Push every 8 hours    Genitourinary Medications    Hematologic/Oncologic Medications  enoxaparin Injectable 40 milliGRAM(s) SubCutaneous daily    Antimicrobial/Immunologic Medications    Endocrine/Metabolic Medications    Topical/Other Medications    --------------------------------------------------------------------------------------    VITAL SIGNS, INS/OUTS (last 24 hours):  --------------------------------------------------------------------------------------  ICU Vital Signs Last 24 Hrs  T(C): 36.8 (04 Nov 2020 04:42), Max: 37.2 (03 Nov 2020 08:44)  T(F): 98.3 (04 Nov 2020 04:42), Max: 98.9 (03 Nov 2020 08:44)  HR: 53 (04 Nov 2020 04:42) (51 - 74)  BP: 106/59 (04 Nov 2020 04:42) (83/63 - 127/58)  BP(mean): 70 (04 Nov 2020 04:42) (69 - 70)  ABP: --  ABP(mean): --  RR: 16 (04 Nov 2020 04:42) (16 - 22)  SpO2: 100% (04 Nov 2020 04:42) (98% - 100%)    I&O's Detail    03 Nov 2020 07:01  -  04 Nov 2020 07:00  --------------------------------------------------------  IN:  Total IN: 0 mL    OUT:    Voided (mL): 1000 mL  Total OUT: 1000 mL    Total NET: -1000 mL  --------------------------------------------------------------------------------------    PHYSICAL EXAM:   Gen: A&Ox3, NAD  Cardio: nl S1/S2, RRR  Pulm: CTABL  Abd: +BS soft, non tender, non distended, no rebound or guarding  Incision: clean, dry and intact  Extr: non tender, non edematous, non erythematous      LABS  --------------------------------------------------------------------------------------                        10.8   8.32  )-----------( 201      ( 03 Nov 2020 23:40 )             32.8                         12.8   7.99  )-----------( 213      ( 03 Nov 2020 05:48 )             38.8     11-03    138  |  104  |  6<L>  ----------------------------<  144<H>  3.0<L>   |  22  |  0.69    Ca    8.6      03 Nov 2020 23:40  Phos  1.6     11-03  Mg     2.2     11-03    TPro  5.9<L>  /  Alb  3.5  /  TBili  0.4  /  DBili  x   /  AST  24  /  ALT  12  /  AlkPhos  36<L>  11-03      LIVER FUNCTIONS - ( 03 Nov 2020 23:40 )  Alb: 3.5 g/dL / Pro: 5.9 g/dL / ALK PHOS: 36 u/L / ALT: 12 u/L / AST: 24 u/L / GGT: x

## 2020-11-04 NOTE — PROVIDER CONTACT NOTE (CHANGE IN STATUS NOTIFICATION) - ASSESSMENT
Pt was cold and clammy with a BP of 83/23, Respirations at 22, O2 sat at 96%, HR at 74. Pt immediately clenched up as she fell back into bed, without hitting her head. Pt woke up a minute later, does not recall what happened.

## 2020-11-04 NOTE — PROGRESS NOTE ADULT - PROBLEM SELECTOR PLAN 4
as above    Gary Moise,PGY1 Fellow Note    Patient seen and examined. Agree with above. Suspect vasovagal overnight. No flatus.    VS reviewed  Labs reviewed Hgb 10.1  Abd softly distended, tympanic    Reg diet  OOB  VTE ppx  PO analgesia  Continue inpatient care    David VILLARREAL

## 2020-11-04 NOTE — PROGRESS NOTE ADULT - PROBLEM SELECTOR PLAN 1
Neuro: continue PO pain medication, tylenol, motrin, oxy PRN  CV: s/p rapid response overnight for BP 83/23 & lightheadedness. H&H stable. Hemodynamically stable, continue to monitor VS, f/u AM labs,  hx of HTN, c/w lopressor PRN  Pulm: Saturating well on room air, encourage incentive spirometry  GI: Advance to regular diet as gloria; continue pantoprazole & pepcid  : UOP adequate, d/c espinoza after AM labs  FEN: replete electrolytes PRN, s/p K+ repletion   Heme: lovenox and SCDs for DVT ppx  Dispo: Continue routine post-op care Neuro: continue PO pain medication, tylenol, motrin, oxy PRN  CV: s/p rapid response overnight for BP 83/23 & lightheadedness.  Hemodynamically stable, continue to monitor VS, AM labs stable from overnight  hx of HTN, however BPs low, will hold meds, continue to monitor  Pulm: Saturating well on room air, encourage incentive spirometry  GI: C/w reg diet as gloria, continue pantoprazole & pepcid  : voiding adequately, continue to monitor, replete electrolytes prn  FEN: replete electrolytes PRN, s/p K+ repletion   Heme: c/w lovenox, ambulation and SCDs for DVT ppx  Dispo: Continue routine post-op care

## 2020-11-04 NOTE — PROVIDER CONTACT NOTE (CHANGE IN STATUS NOTIFICATION) - ACTION/TREATMENT ORDERED:
Pt to be monitored closely for changing BP's, increase pt's fluid intake, more pain control to avoid overwhelming response to pain. Simethicone given to reduce gas pain as per GYN/Oncology.

## 2020-11-04 NOTE — RAPID RESPONSE TEAM SUMMARY - NSSITUATIONBACKGROUNDRRT_GEN_ALL_CORE
61 yo F PMH HTN, HLD, TIA (2019, on plavix), GERD who underwent an ex lap, STERLING, BSO, FADUMO for a pelvic mass. She is POD 1, and had an episode where upon standing, she had an episode of syncope. She had diaphoresis prior to passing out. Nurse reports she briefly went stiff for which they call code stroke and medical RRT. Upon arrival the patient was back to baseline and hemodynamically stable. Orthostatics were negative. She has severe abdominal pain. Likely vasovagal due to pain vs orthostatic hypotension. Sent CBC, BMP. FSG was wnl. Nurse called and reported event to primary team.

## 2020-11-05 DIAGNOSIS — Z98.890 OTHER SPECIFIED POSTPROCEDURAL STATES: ICD-10-CM

## 2020-11-05 LAB
ANION GAP SERPL CALC-SCNC: 10 MMO/L — SIGNIFICANT CHANGE UP (ref 7–14)
BUN SERPL-MCNC: 8 MG/DL — SIGNIFICANT CHANGE UP (ref 7–23)
CALCIUM SERPL-MCNC: 8.3 MG/DL — LOW (ref 8.4–10.5)
CHLORIDE SERPL-SCNC: 103 MMOL/L — SIGNIFICANT CHANGE UP (ref 98–107)
CO2 SERPL-SCNC: 25 MMOL/L — SIGNIFICANT CHANGE UP (ref 22–31)
CREAT SERPL-MCNC: 0.7 MG/DL — SIGNIFICANT CHANGE UP (ref 0.5–1.3)
GLUCOSE BLDC GLUCOMTR-MCNC: 110 MG/DL — HIGH (ref 70–99)
GLUCOSE SERPL-MCNC: 90 MG/DL — SIGNIFICANT CHANGE UP (ref 70–99)
HCT VFR BLD CALC: 22.9 % — LOW (ref 34.5–45)
HCT VFR BLD CALC: 23.9 % — LOW (ref 34.5–45)
HGB BLD-MCNC: 7.7 G/DL — LOW (ref 11.5–15.5)
HGB BLD-MCNC: 8 G/DL — LOW (ref 11.5–15.5)
MAGNESIUM SERPL-MCNC: 1.9 MG/DL — SIGNIFICANT CHANGE UP (ref 1.6–2.6)
MCHC RBC-ENTMCNC: 30.1 PG — SIGNIFICANT CHANGE UP (ref 27–34)
MCHC RBC-ENTMCNC: 33.6 % — SIGNIFICANT CHANGE UP (ref 32–36)
MCV RBC AUTO: 89.5 FL — SIGNIFICANT CHANGE UP (ref 80–100)
NRBC # FLD: 0 K/UL — SIGNIFICANT CHANGE UP (ref 0–0)
PHOSPHATE SERPL-MCNC: 2.2 MG/DL — LOW (ref 2.5–4.5)
PLATELET # BLD AUTO: 166 K/UL — SIGNIFICANT CHANGE UP (ref 150–400)
PMV BLD: 9.9 FL — SIGNIFICANT CHANGE UP (ref 7–13)
POTASSIUM SERPL-MCNC: 3 MMOL/L — LOW (ref 3.5–5.3)
POTASSIUM SERPL-SCNC: 3 MMOL/L — LOW (ref 3.5–5.3)
RBC # BLD: 2.56 M/UL — LOW (ref 3.8–5.2)
RBC # FLD: 13 % — SIGNIFICANT CHANGE UP (ref 10.3–14.5)
SODIUM SERPL-SCNC: 138 MMOL/L — SIGNIFICANT CHANGE UP (ref 135–145)
WBC # BLD: 5.94 K/UL — SIGNIFICANT CHANGE UP (ref 3.8–10.5)
WBC # FLD AUTO: 5.94 K/UL — SIGNIFICANT CHANGE UP (ref 3.8–10.5)

## 2020-11-05 RX ORDER — IBUPROFEN 200 MG
600 TABLET ORAL EVERY 6 HOURS
Refills: 0 | Status: DISCONTINUED | OUTPATIENT
Start: 2020-11-05 | End: 2020-11-05

## 2020-11-05 RX ORDER — ASCORBIC ACID 60 MG
500 TABLET,CHEWABLE ORAL DAILY
Refills: 0 | Status: DISCONTINUED | OUTPATIENT
Start: 2020-11-05 | End: 2020-11-06

## 2020-11-05 RX ORDER — FERROUS SULFATE 325(65) MG
325 TABLET ORAL
Refills: 0 | Status: DISCONTINUED | OUTPATIENT
Start: 2020-11-05 | End: 2020-11-06

## 2020-11-05 RX ORDER — POTASSIUM CHLORIDE 20 MEQ
20 PACKET (EA) ORAL
Refills: 0 | Status: COMPLETED | OUTPATIENT
Start: 2020-11-05 | End: 2020-11-05

## 2020-11-05 RX ORDER — IBUPROFEN 200 MG
600 TABLET ORAL EVERY 6 HOURS
Refills: 0 | Status: DISCONTINUED | OUTPATIENT
Start: 2020-11-05 | End: 2020-11-06

## 2020-11-05 RX ORDER — MAGNESIUM OXIDE 400 MG ORAL TABLET 241.3 MG
400 TABLET ORAL ONCE
Refills: 0 | Status: COMPLETED | OUTPATIENT
Start: 2020-11-05 | End: 2020-11-05

## 2020-11-05 RX ORDER — ENOXAPARIN SODIUM 100 MG/ML
40 INJECTION SUBCUTANEOUS EVERY 24 HOURS
Refills: 0 | Status: DISCONTINUED | OUTPATIENT
Start: 2020-11-05 | End: 2020-11-05

## 2020-11-05 RX ADMIN — Medication 975 MILLIGRAM(S): at 16:00

## 2020-11-05 RX ADMIN — Medication 20 MILLIEQUIVALENT(S): at 11:51

## 2020-11-05 RX ADMIN — Medication 15 MILLIGRAM(S): at 05:43

## 2020-11-05 RX ADMIN — Medication 20 MILLIEQUIVALENT(S): at 14:08

## 2020-11-05 RX ADMIN — Medication 975 MILLIGRAM(S): at 05:43

## 2020-11-05 RX ADMIN — Medication 500 MILLIGRAM(S): at 11:51

## 2020-11-05 RX ADMIN — SODIUM CHLORIDE 3 MILLILITER(S): 9 INJECTION INTRAMUSCULAR; INTRAVENOUS; SUBCUTANEOUS at 22:04

## 2020-11-05 RX ADMIN — SIMETHICONE 80 MILLIGRAM(S): 80 TABLET, CHEWABLE ORAL at 05:43

## 2020-11-05 RX ADMIN — SIMETHICONE 80 MILLIGRAM(S): 80 TABLET, CHEWABLE ORAL at 11:51

## 2020-11-05 RX ADMIN — MAGNESIUM OXIDE 400 MG ORAL TABLET 400 MILLIGRAM(S): 241.3 TABLET ORAL at 09:02

## 2020-11-05 RX ADMIN — SODIUM CHLORIDE 3 MILLILITER(S): 9 INJECTION INTRAMUSCULAR; INTRAVENOUS; SUBCUTANEOUS at 14:08

## 2020-11-05 RX ADMIN — FAMOTIDINE 20 MILLIGRAM(S): 10 INJECTION INTRAVENOUS at 11:51

## 2020-11-05 RX ADMIN — SODIUM CHLORIDE 3 MILLILITER(S): 9 INJECTION INTRAMUSCULAR; INTRAVENOUS; SUBCUTANEOUS at 05:42

## 2020-11-05 RX ADMIN — PANTOPRAZOLE SODIUM 40 MILLIGRAM(S): 20 TABLET, DELAYED RELEASE ORAL at 05:43

## 2020-11-05 RX ADMIN — Medication 600 MILLIGRAM(S): at 17:46

## 2020-11-05 RX ADMIN — Medication 20 MILLIEQUIVALENT(S): at 09:02

## 2020-11-05 RX ADMIN — Medication 975 MILLIGRAM(S): at 15:23

## 2020-11-05 RX ADMIN — Medication 325 MILLIGRAM(S): at 17:46

## 2020-11-05 RX ADMIN — SIMETHICONE 80 MILLIGRAM(S): 80 TABLET, CHEWABLE ORAL at 17:46

## 2020-11-05 NOTE — PROGRESS NOTE ADULT - SUBJECTIVE AND OBJECTIVE BOX
GYN ONC Progress Note    HD#4            POD#3     Interval/Overnight Events:   Pt seen at bedside.  No acute events overnight.  Denies fevers, chills, nausea, vomiting, SOB, CP, lightheadedness, dizziness, pain is well controlled.  Pt is ambulating, tolerating PO, voiding spontaneously without issue.      Allergies: No Known Allergies      MEDICATIONS:   --------------------------------------------------------------------------------------  Neurologic Medications  acetaminophen   Tablet .. 975 milliGRAM(s) Oral every 6 hours  ketorolac   Injectable 15 milliGRAM(s) IV Push every 6 hours  oxyCODONE    IR 5 milliGRAM(s) Oral every 3 hours PRN Moderate Pain (4 - 6)  oxyCODONE    IR 10 milliGRAM(s) Oral every 3 hours PRN Severe Pain (7 - 10)    Respiratory Medications    Cardiovascular Medications    Gastrointestinal Medications  famotidine    Tablet 20 milliGRAM(s) Oral daily  lactated ringers. 1000 milliLiter(s) IV Continuous <Continuous>  pantoprazole    Tablet 40 milliGRAM(s) Oral before breakfast  simethicone 80 milliGRAM(s) Chew every 6 hours  sodium chloride 0.9% lock flush 3 milliLiter(s) IV Push every 8 hours    Genitourinary Medications    Hematologic/Oncologic Medications  enoxaparin Injectable 40 milliGRAM(s) SubCutaneous daily    Antimicrobial/Immunologic Medications    Endocrine/Metabolic Medications    Topical/Other Medications    --------------------------------------------------------------------------------------    VITAL SIGNS, INS/OUTS (last 24 hours):  --------------------------------------------------------------------------------------  ICU Vital Signs Last 24 Hrs  T(C): 37.2 (05 Nov 2020 01:29), Max: 37.2 (04 Nov 2020 15:43)  T(F): 98.9 (05 Nov 2020 01:29), Max: 98.9 (04 Nov 2020 15:43)  HR: 70 (05 Nov 2020 01:29) (61 - 72)  BP: 112/52 (05 Nov 2020 01:29) (104/58 - 119/59)  BP(mean): --  ABP: --  ABP(mean): --  RR: 18 (05 Nov 2020 01:29) (16 - 18)  SpO2: 100% (05 Nov 2020 01:29) (97% - 100%)    I&O's Detail    03 Nov 2020 07:01  -  04 Nov 2020 07:00  --------------------------------------------------------  IN:  Total IN: 0 mL    OUT:    Voided (mL): 1000 mL  Total OUT: 1000 mL    Total NET: -1000 mL      04 Nov 2020 07:01  -  05 Nov 2020 05:10  --------------------------------------------------------  IN:    Oral Fluid: 50 mL  Total IN: 50 mL    OUT:    Voided (mL): 800 mL  Total OUT: 800 mL    Total NET: -750 mL        --------------------------------------------------------------------------------------    PHYSICAL EXAM:   Gen: A&Ox3, NAD  Cardio: nl S1/S2, RRR  Pulm: CTABL  Abd: +BS soft, non tender, non distended, no rebound or guarding  Incision: midline vertical incision clean, dry and intact  Extr: non tender, non edematous, non erythematous      LABS  --------------------------------------------------------------------------------------                        10.2   8.24  )-----------( 206      ( 04 Nov 2020 05:48 )             31.6                         10.8   8.32  )-----------( 201      ( 03 Nov 2020 23:40 )             32.8                         12.8   7.99  )-----------( 213      ( 03 Nov 2020 05:48 )             38.8     11-04    139  |  103  |  8   ----------------------------<  129<H>  3.5   |  29  |  0.70    Ca    8.2<L>      04 Nov 2020 05:48  Phos  1.8     11-04  Mg     2.1     11-04    TPro  5.9<L>  /  Alb  3.5  /  TBili  0.4  /  DBili  x   /  AST  24  /  ALT  12  /  AlkPhos  36<L>  11-03      LIVER FUNCTIONS - ( 03 Nov 2020 23:40 )  Alb: 3.5 g/dL / Pro: 5.9 g/dL / ALK PHOS: 36 u/L / ALT: 12 u/L / AST: 24 u/L / GGT: x            GYN ONC Progress Note    HD#4            POD#3     Interval/Overnight Events:   Pt seen at bedside.  No acute events overnight.  Ate tuna and carrots last night without nasuea.  Patient received fluids overnight which she says has improved her overall feeling.  Denies fevers, chills, nausea, vomiting, SOB, CP, lightheadedness, dizziness, pain is well controlled.  Pt is ambulating, tolerating PO, voiding spontaneously without issue, passing gas.      Allergies: No Known Allergies      MEDICATIONS:   --------------------------------------------------------------------------------------  Neurologic Medications  acetaminophen   Tablet .. 975 milliGRAM(s) Oral every 6 hours  ketorolac   Injectable 15 milliGRAM(s) IV Push every 6 hours  oxyCODONE    IR 5 milliGRAM(s) Oral every 3 hours PRN Moderate Pain (4 - 6)  oxyCODONE    IR 10 milliGRAM(s) Oral every 3 hours PRN Severe Pain (7 - 10)    Respiratory Medications    Cardiovascular Medications    Gastrointestinal Medications  famotidine    Tablet 20 milliGRAM(s) Oral daily  lactated ringers. 1000 milliLiter(s) IV Continuous <Continuous>  pantoprazole    Tablet 40 milliGRAM(s) Oral before breakfast  simethicone 80 milliGRAM(s) Chew every 6 hours  sodium chloride 0.9% lock flush 3 milliLiter(s) IV Push every 8 hours    Genitourinary Medications    Hematologic/Oncologic Medications  enoxaparin Injectable 40 milliGRAM(s) SubCutaneous daily    Antimicrobial/Immunologic Medications    Endocrine/Metabolic Medications    Topical/Other Medications    --------------------------------------------------------------------------------------    VITAL SIGNS, INS/OUTS (last 24 hours):  --------------------------------------------------------------------------------------  ICU Vital Signs Last 24 Hrs  T(C): 37.2 (05 Nov 2020 01:29), Max: 37.2 (04 Nov 2020 15:43)  T(F): 98.9 (05 Nov 2020 01:29), Max: 98.9 (04 Nov 2020 15:43)  HR: 70 (05 Nov 2020 01:29) (61 - 72)  BP: 112/52 (05 Nov 2020 01:29) (104/58 - 119/59)  BP(mean): --  ABP: --  ABP(mean): --  RR: 18 (05 Nov 2020 01:29) (16 - 18)  SpO2: 100% (05 Nov 2020 01:29) (97% - 100%)    I&O's Detail    03 Nov 2020 07:01  -  04 Nov 2020 07:00  --------------------------------------------------------  IN:  Total IN: 0 mL    OUT:    Voided (mL): 1000 mL  Total OUT: 1000 mL    Total NET: -1000 mL      04 Nov 2020 07:01  -  05 Nov 2020 05:10  --------------------------------------------------------  IN:    Oral Fluid: 50 mL  Total IN: 50 mL    OUT:    Voided (mL): 800 mL  Total OUT: 800 mL    Total NET: -750 mL        --------------------------------------------------------------------------------------    PHYSICAL EXAM:   Gen: A&Ox3, NAD  Cardio: nl S1/S2, RRR  Pulm: CTABL  Abd: +BS soft, non tender, non distended, no rebound or guarding  Incision: midline vertical incision clean, dry and intact  Extr: non tender, non edematous, non erythematous      LABS  --------------------------------------------------------------------------------------                        10.2   8.24  )-----------( 206      ( 04 Nov 2020 05:48 )             31.6                         10.8   8.32  )-----------( 201      ( 03 Nov 2020 23:40 )             32.8                         12.8   7.99  )-----------( 213      ( 03 Nov 2020 05:48 )             38.8     11-04    139  |  103  |  8   ----------------------------<  129<H>  3.5   |  29  |  0.70    Ca    8.2<L>      04 Nov 2020 05:48  Phos  1.8     11-04  Mg     2.1     11-04    TPro  5.9<L>  /  Alb  3.5  /  TBili  0.4  /  DBili  x   /  AST  24  /  ALT  12  /  AlkPhos  36<L>  11-03      LIVER FUNCTIONS - ( 03 Nov 2020 23:40 )  Alb: 3.5 g/dL / Pro: 5.9 g/dL / ALK PHOS: 36 u/L / ALT: 12 u/L / AST: 24 u/L / GGT: x

## 2020-11-05 NOTE — PROGRESS NOTE ADULT - PROBLEM SELECTOR PLAN 1
Neuro: continue PO pain medication, tylenol, motrin, oxy PRN  CV: Hemodynamically stable, continue to monitor VS,  hx of HTN, however BPs low, will hold meds, continue to monitor  Pulm: Saturating well on room air, encourage incentive spirometry  GI: C/w reg diet as gloria, continue pantoprazole & pepcid  : voiding adequately, continue to monitor, replete electrolytes prn  FEN: replete electrolytes PRN, s/p K+ & Phos repletion   Heme: c/w lovenox, ambulation and SCDs for DVT ppx  Dispo: Continue routine post-op care.       Gary Moise, PGY1

## 2020-11-05 NOTE — PROGRESS NOTE ADULT - PROBLEM SELECTOR PLAN 2
Fellow Note    Patient seen and examined. Agree with above.    VS reviewed  Labs reviewed, Hgb 7.7    Reg diet  OOB  VTE ppx  PO analgesia  Repeat H/H at 10 AM    David VILLARREAL

## 2020-11-05 NOTE — PROGRESS NOTE ADULT - ASSESSMENT
59yo s/p STERLING, BSO, FADUMO (frozen=benign) POD#3, HD#4. S/p rapid response on POD#2 for hypotension. In stable condition.   59yo s/p STERLING, BSO, FADUMO (frozen=benign) POD#3, HD#4. S/p rapid response on POD#2 for hypotension, likely vasovagal. In stable condition.

## 2020-11-05 NOTE — CHART NOTE - NSCHARTNOTEFT_GEN_A_CORE
R2 GYN ONC Chart Note    Patient assessed due to AM H/H of 7.7/22.9.  Repeat H/H 8.0/23.9.  Orthostatics perfomed and wnl.  Patient ambulating hallways, passing gas, had a bowel movement this morning, and ate breakfast consisting of porridge, apple sauce and juice.  Patient reports no complaints and is feeling well.  Denies lightheadedness/dizziness/abdominal pain/nausea/vomiting/fevers/chills.    Vital Signs Last 24 Hrs  T(C): 37.1 (05 Nov 2020 10:07), Max: 37.2 (04 Nov 2020 15:43)  T(F): 98.7 (05 Nov 2020 10:07), Max: 98.9 (04 Nov 2020 15:43)  HR: 77 (05 Nov 2020 09:10) (65 - 88)  BP: 121/54 (05 Nov 2020 09:10) (104/58 - 121/54)  BP(mean): --  RR: 17 (05 Nov 2020 09:10) (16 - 18)  SpO2: 100% (05 Nov 2020 09:10) (97% - 100%)      CBC (11-05 @ 09:40)                              8.0<L>                         --      )----------------(  --         --    % Neutrophils, --    % Lymphocytes, ANC: --                                  23.9<L>              CBC (11-05 @ 05:10)                              7.7<L>                         5.94    )----------------(  166        --    % Neutrophils, --    % Lymphocytes, ANC: --                                  22.9<L>                PE:  Patient ambulating hallways upon arrival  Abdomen soft and nontender    Plan:  -continue with postoperative care as planned  -strict I/Os  -hold lovenox  -Iron BID and Vit C  -continue to monitor    d/w Dr. Sony Hannah, PGY2

## 2020-11-06 ENCOUNTER — TRANSCRIPTION ENCOUNTER (OUTPATIENT)
Age: 60
End: 2020-11-06

## 2020-11-06 VITALS
TEMPERATURE: 98 F | RESPIRATION RATE: 19 BRPM | OXYGEN SATURATION: 100 % | HEART RATE: 93 BPM | SYSTOLIC BLOOD PRESSURE: 116 MMHG | DIASTOLIC BLOOD PRESSURE: 79 MMHG

## 2020-11-06 LAB
ANION GAP SERPL CALC-SCNC: 7 MMO/L — SIGNIFICANT CHANGE UP (ref 7–14)
BLD GP AB SCN SERPL QL: NEGATIVE — SIGNIFICANT CHANGE UP
BUN SERPL-MCNC: 8 MG/DL — SIGNIFICANT CHANGE UP (ref 7–23)
CALCIUM SERPL-MCNC: 8.5 MG/DL — SIGNIFICANT CHANGE UP (ref 8.4–10.5)
CHLORIDE SERPL-SCNC: 106 MMOL/L — SIGNIFICANT CHANGE UP (ref 98–107)
CO2 SERPL-SCNC: 27 MMOL/L — SIGNIFICANT CHANGE UP (ref 22–31)
CREAT SERPL-MCNC: 0.57 MG/DL — SIGNIFICANT CHANGE UP (ref 0.5–1.3)
GLUCOSE SERPL-MCNC: 90 MG/DL — SIGNIFICANT CHANGE UP (ref 70–99)
HCT VFR BLD CALC: 22 % — LOW (ref 34.5–45)
HGB BLD-MCNC: 7.5 G/DL — LOW (ref 11.5–15.5)
MAGNESIUM SERPL-MCNC: 1.9 MG/DL — SIGNIFICANT CHANGE UP (ref 1.6–2.6)
MCHC RBC-ENTMCNC: 30.4 PG — SIGNIFICANT CHANGE UP (ref 27–34)
MCHC RBC-ENTMCNC: 34.1 % — SIGNIFICANT CHANGE UP (ref 32–36)
MCV RBC AUTO: 89.1 FL — SIGNIFICANT CHANGE UP (ref 80–100)
NRBC # FLD: 0 K/UL — SIGNIFICANT CHANGE UP (ref 0–0)
PHOSPHATE SERPL-MCNC: 2.1 MG/DL — LOW (ref 2.5–4.5)
PLATELET # BLD AUTO: 160 K/UL — SIGNIFICANT CHANGE UP (ref 150–400)
PMV BLD: 9.7 FL — SIGNIFICANT CHANGE UP (ref 7–13)
POTASSIUM SERPL-MCNC: 3.6 MMOL/L — SIGNIFICANT CHANGE UP (ref 3.5–5.3)
POTASSIUM SERPL-SCNC: 3.6 MMOL/L — SIGNIFICANT CHANGE UP (ref 3.5–5.3)
RBC # BLD: 2.47 M/UL — LOW (ref 3.8–5.2)
RBC # FLD: 13 % — SIGNIFICANT CHANGE UP (ref 10.3–14.5)
RH IG SCN BLD-IMP: POSITIVE — SIGNIFICANT CHANGE UP
SODIUM SERPL-SCNC: 140 MMOL/L — SIGNIFICANT CHANGE UP (ref 135–145)
WBC # BLD: 4.71 K/UL — SIGNIFICANT CHANGE UP (ref 3.8–10.5)
WBC # FLD AUTO: 4.71 K/UL — SIGNIFICANT CHANGE UP (ref 3.8–10.5)

## 2020-11-06 RX ORDER — SODIUM,POTASSIUM PHOSPHATES 278-250MG
1 POWDER IN PACKET (EA) ORAL ONCE
Refills: 0 | Status: COMPLETED | OUTPATIENT
Start: 2020-11-06 | End: 2020-11-06

## 2020-11-06 RX ORDER — MAGNESIUM OXIDE 400 MG ORAL TABLET 241.3 MG
400 TABLET ORAL ONCE
Refills: 0 | Status: COMPLETED | OUTPATIENT
Start: 2020-11-06 | End: 2020-11-06

## 2020-11-06 RX ADMIN — SODIUM CHLORIDE 3 MILLILITER(S): 9 INJECTION INTRAMUSCULAR; INTRAVENOUS; SUBCUTANEOUS at 14:56

## 2020-11-06 RX ADMIN — Medication 600 MILLIGRAM(S): at 00:20

## 2020-11-06 RX ADMIN — Medication 325 MILLIGRAM(S): at 08:20

## 2020-11-06 RX ADMIN — SIMETHICONE 80 MILLIGRAM(S): 80 TABLET, CHEWABLE ORAL at 06:37

## 2020-11-06 RX ADMIN — Medication 500 MILLIGRAM(S): at 11:26

## 2020-11-06 RX ADMIN — PANTOPRAZOLE SODIUM 40 MILLIGRAM(S): 20 TABLET, DELAYED RELEASE ORAL at 06:39

## 2020-11-06 RX ADMIN — Medication 1 TABLET(S): at 11:26

## 2020-11-06 RX ADMIN — Medication 975 MILLIGRAM(S): at 06:36

## 2020-11-06 RX ADMIN — SIMETHICONE 80 MILLIGRAM(S): 80 TABLET, CHEWABLE ORAL at 11:26

## 2020-11-06 RX ADMIN — FAMOTIDINE 20 MILLIGRAM(S): 10 INJECTION INTRAVENOUS at 11:26

## 2020-11-06 RX ADMIN — SIMETHICONE 80 MILLIGRAM(S): 80 TABLET, CHEWABLE ORAL at 00:20

## 2020-11-06 RX ADMIN — MAGNESIUM OXIDE 400 MG ORAL TABLET 400 MILLIGRAM(S): 241.3 TABLET ORAL at 08:19

## 2020-11-06 RX ADMIN — Medication 975 MILLIGRAM(S): at 16:21

## 2020-11-06 RX ADMIN — SODIUM CHLORIDE 3 MILLILITER(S): 9 INJECTION INTRAMUSCULAR; INTRAVENOUS; SUBCUTANEOUS at 06:27

## 2020-11-06 NOTE — DISCHARGE NOTE NURSING/CASE MANAGEMENT/SOCIAL WORK - PATIENT PORTAL LINK FT
You can access the FollowMyHealth Patient Portal offered by [facility] by registering at the following website: [email or web address]. By joining [employer]’s FollowMyHealth portal, you will also be able to view your health information using other applications (apps) compatible with our system.

## 2020-11-06 NOTE — PROGRESS NOTE ADULT - SUBJECTIVE AND OBJECTIVE BOX
GYN ONC Progress Note    HD#5            POD#4     Interval/Overnight Events:   Pt seen at bedside.  No acute events overnight.  Denies fevers, chills, nausea, vomiting, SOB, CP, lightheadedness, dizziness, pain is well controlled.  Pt is ambulating, tolerating PO, voiding spontaneously without issue.      Allergies: No Known Allergies      MEDICATIONS:   --------------------------------------------------------------------------------------  Neurologic Medications  acetaminophen   Tablet .. 975 milliGRAM(s) Oral every 6 hours  ibuprofen  Tablet. 600 milliGRAM(s) Oral every 6 hours  oxyCODONE    IR 5 milliGRAM(s) Oral every 3 hours PRN Moderate Pain (4 - 6)  oxyCODONE    IR 10 milliGRAM(s) Oral every 3 hours PRN Severe Pain (7 - 10)    Respiratory Medications    Cardiovascular Medications    Gastrointestinal Medications  ascorbic acid 500 milliGRAM(s) Oral daily  famotidine    Tablet 20 milliGRAM(s) Oral daily  ferrous    sulfate 325 milliGRAM(s) Oral two times a day  pantoprazole    Tablet 40 milliGRAM(s) Oral before breakfast  simethicone 80 milliGRAM(s) Chew every 6 hours  sodium chloride 0.9% lock flush 3 milliLiter(s) IV Push every 8 hours    Genitourinary Medications    Hematologic/Oncologic Medications    Antimicrobial/Immunologic Medications    Endocrine/Metabolic Medications    Topical/Other Medications    --------------------------------------------------------------------------------------    VITAL SIGNS, INS/OUTS (last 24 hours):  --------------------------------------------------------------------------------------  ICU Vital Signs Last 24 Hrs  T(C): 36.7 (06 Nov 2020 01:22), Max: 37.4 (05 Nov 2020 14:46)  T(F): 98.1 (06 Nov 2020 01:22), Max: 99.3 (05 Nov 2020 14:46)  HR: 61 (06 Nov 2020 01:22) (59 - 77)  BP: 112/51 (06 Nov 2020 01:22) (110/43 - 125/56)  BP(mean): --  ABP: --  ABP(mean): --  RR: 16 (06 Nov 2020 01:22) (16 - 18)  SpO2: 100% (06 Nov 2020 01:22) (96% - 100%)    I&O's Detail    04 Nov 2020 07:01  -  05 Nov 2020 07:00  --------------------------------------------------------  IN:    Oral Fluid: 50 mL  Total IN: 50 mL    OUT:    Voided (mL): 950 mL  Total OUT: 950 mL    Total NET: -900 mL      05 Nov 2020 07:01  -  06 Nov 2020 05:48  --------------------------------------------------------  IN:    Oral Fluid: 300 mL  Total IN: 300 mL    OUT:    Voided (mL): 1350 mL  Total OUT: 1350 mL    Total NET: -1050 mL        --------------------------------------------------------------------------------------    PHYSICAL EXAM:   Gen: A&Ox3, NAD  Cardio: nl S1/S2, RRR  Pulm: CTABL  Abd: +BS soft, non tender, non distended, no rebound or guarding  Incision: midline vertical incision clean, dry and intact  Extr: non tender, non edematous, non erythematous      LABS  --------------------------------------------------------------------------------------                        8.0    x     )-----------( x        ( 05 Nov 2020 09:40 )             23.9                         7.7    5.94  )-----------( 166      ( 05 Nov 2020 05:10 )             22.9     11-05    138  |  103  |  8   ----------------------------<  90  3.0<L>   |  25  |  0.70    Ca    8.3<L>      05 Nov 2020 05:10  Phos  2.2     11-05  Mg     1.9     11-05              ---------------------------------------------------------------------------------------    OTHER LABORATORY:     IMAGING STUDIES: GYN ONC Progress Note    HD#5            POD#4     Interval/Overnight Events:   Pt seen at bedside.  No acute events overnight.  Denies fevers, chills, nausea, vomiting, SOB, CP, lightheadedness, dizziness, pain is well controlled.  Pt is ambulating, tolerating PO, voiding spontaneously without issue.      Allergies: No Known Allergies      MEDICATIONS:   --------------------------------------------------------------------------------------  Neurologic Medications  acetaminophen   Tablet .. 975 milliGRAM(s) Oral every 6 hours  ibuprofen  Tablet. 600 milliGRAM(s) Oral every 6 hours  oxyCODONE    IR 5 milliGRAM(s) Oral every 3 hours PRN Moderate Pain (4 - 6)  oxyCODONE    IR 10 milliGRAM(s) Oral every 3 hours PRN Severe Pain (7 - 10)    Respiratory Medications    Cardiovascular Medications    Gastrointestinal Medications  ascorbic acid 500 milliGRAM(s) Oral daily  famotidine    Tablet 20 milliGRAM(s) Oral daily  ferrous    sulfate 325 milliGRAM(s) Oral two times a day  pantoprazole    Tablet 40 milliGRAM(s) Oral before breakfast  simethicone 80 milliGRAM(s) Chew every 6 hours  sodium chloride 0.9% lock flush 3 milliLiter(s) IV Push every 8 hours    Genitourinary Medications    Hematologic/Oncologic Medications    Antimicrobial/Immunologic Medications    Endocrine/Metabolic Medications    Topical/Other Medications    --------------------------------------------------------------------------------------    VITAL SIGNS, INS/OUTS (last 24 hours):  --------------------------------------------------------------------------------------  ICU Vital Signs Last 24 Hrs  T(C): 36.7 (06 Nov 2020 01:22), Max: 37.4 (05 Nov 2020 14:46)  T(F): 98.1 (06 Nov 2020 01:22), Max: 99.3 (05 Nov 2020 14:46)  HR: 61 (06 Nov 2020 01:22) (59 - 77)  BP: 112/51 (06 Nov 2020 01:22) (110/43 - 125/56)  BP(mean): --  ABP: --  ABP(mean): --  RR: 16 (06 Nov 2020 01:22) (16 - 18)  SpO2: 100% (06 Nov 2020 01:22) (96% - 100%)    I&O's Detail    04 Nov 2020 07:01  -  05 Nov 2020 07:00  --------------------------------------------------------  IN:    Oral Fluid: 50 mL  Total IN: 50 mL    OUT:    Voided (mL): 950 mL  Total OUT: 950 mL    Total NET: -900 mL      05 Nov 2020 07:01  -  06 Nov 2020 05:48  --------------------------------------------------------  IN:    Oral Fluid: 300 mL  Total IN: 300 mL    OUT:    Voided (mL): 1350 mL  Total OUT: 1350 mL    Total NET: -1050 mL        --------------------------------------------------------------------------------------    PHYSICAL EXAM:   Gen: A&Ox3, NAD  Cardio: nl S1/S2, RRR  Pulm: CTABL  Abd: +BS soft, non tender, softly distended, no rebound or guarding  Incision: midline vertical incision clean, dry and intact  Extr: non tender, non edematous, non erythematous      LABS  --------------------------------------------------------------------------------------                        8.0    x     )-----------( x        ( 05 Nov 2020 09:40 )             23.9                         7.7    5.94  )-----------( 166      ( 05 Nov 2020 05:10 )             22.9     11-05    138  |  103  |  8   ----------------------------<  90  3.0<L>   |  25  |  0.70    Ca    8.3<L>      05 Nov 2020 05:10  Phos  2.2     11-05  Mg     1.9     11-05              ---------------------------------------------------------------------------------------    OTHER LABORATORY:     IMAGING STUDIES:

## 2020-11-06 NOTE — PROGRESS NOTE ADULT - PROBLEM SELECTOR PLAN 1
Fellow Note    Patient seen and examined. Agree with above.    VS reviewed  Labs reviewed Hgb 7.5  Softly distended, no rebound/guarding  Reg diet  OOB  VTE ppx  PO analgesia  Eval for discharge this afternoon    David VILLARREAL

## 2020-11-06 NOTE — DISCHARGE NOTE NURSING/CASE MANAGEMENT/SOCIAL WORK - NSDCPNINST_GEN_ALL_CORE
Please return to ED if you develop any signs of infection such as fever, nausea, vomiting, diarrhea, redness at incision sites. If you develop any pus like drainage from incision sites, return to ED and notify your provider.

## 2020-11-06 NOTE — PROGRESS NOTE ADULT - ASSESSMENT
61yo s/p STERLING, BSO, FADUMO (frozen=benign) POD#4, HD#5. S/p rapid response on POD#2 for hypotension, likely vasovagal.  Patient meeting postoperative milestones, currently in stable condition.    Plan: Neuro: continue PO pain medication, tylenol, motrin, oxy PRN  CV: Hemodynamically stable, POD#3 Hct drop to 22.9 with repeat stable at 23.9, asymptomatic, f/u AM labs continue to monitor VS,  hx of HTN, however BPs low, will hold meds, continue to monitor  Pulm: Saturating well on room air, encourage incentive spirometry  GI: C/w reg diet, continue pantoprazole & pepcid  : voiding adequately, continue to monitor  FEN: replete electrolytes PRN, s/p K+ & Phos repletion   Heme: ambulation and SCDs for DV  Dispo: discharge planning    Alfredo Hannah, PGY2

## 2020-11-08 ENCOUNTER — NON-APPOINTMENT (OUTPATIENT)
Age: 60
End: 2020-11-08

## 2020-11-09 ENCOUNTER — NON-APPOINTMENT (OUTPATIENT)
Age: 60
End: 2020-11-09

## 2020-11-13 ENCOUNTER — NON-APPOINTMENT (OUTPATIENT)
Age: 60
End: 2020-11-13

## 2020-11-13 LAB — SURGICAL PATHOLOGY STUDY: SIGNIFICANT CHANGE UP

## 2020-11-17 PROBLEM — N85.2 ENLARGED UTERUS: Status: ACTIVE | Noted: 2020-09-18

## 2020-11-17 PROBLEM — Z48.89 POSTOPERATIVE VISIT: Status: ACTIVE | Noted: 2020-11-17

## 2020-11-17 PROBLEM — D25.9 UTERINE FIBROID: Status: ACTIVE | Noted: 2020-09-18

## 2020-11-18 ENCOUNTER — APPOINTMENT (OUTPATIENT)
Dept: GYNECOLOGIC ONCOLOGY | Facility: CLINIC | Age: 60
End: 2020-11-18
Payer: COMMERCIAL

## 2020-11-18 VITALS — TEMPERATURE: 98.5 F | SYSTOLIC BLOOD PRESSURE: 137 MMHG | DIASTOLIC BLOOD PRESSURE: 74 MMHG | HEART RATE: 78 BPM

## 2020-11-18 DIAGNOSIS — Z48.89 ENCOUNTER FOR OTHER SPECIFIED SURGICAL AFTERCARE: ICD-10-CM

## 2020-11-18 DIAGNOSIS — N85.2 HYPERTROPHY OF UTERUS: ICD-10-CM

## 2020-11-18 DIAGNOSIS — D25.9 LEIOMYOMA OF UTERUS, UNSPECIFIED: ICD-10-CM

## 2020-11-18 PROCEDURE — 99024 POSTOP FOLLOW-UP VISIT: CPT

## 2020-12-02 ENCOUNTER — APPOINTMENT (OUTPATIENT)
Dept: ALLERGY | Facility: CLINIC | Age: 60
End: 2020-12-02
Payer: COMMERCIAL

## 2020-12-02 VITALS
BODY MASS INDEX: 21.37 KG/M2 | RESPIRATION RATE: 14 BRPM | DIASTOLIC BLOOD PRESSURE: 70 MMHG | OXYGEN SATURATION: 98 % | SYSTOLIC BLOOD PRESSURE: 131 MMHG | WEIGHT: 106 LBS | HEART RATE: 80 BPM | HEIGHT: 59 IN | TEMPERATURE: 98.7 F

## 2020-12-02 PROCEDURE — 99213 OFFICE O/P EST LOW 20 MIN: CPT

## 2020-12-02 PROCEDURE — 99072 ADDL SUPL MATRL&STAF TM PHE: CPT

## 2020-12-02 RX ORDER — NEOMYCIN SULFATE 500 MG/1
500 TABLET ORAL
Qty: 6 | Refills: 0 | Status: DISCONTINUED | COMMUNITY
Start: 2020-10-30 | End: 2020-12-02

## 2020-12-02 RX ORDER — METRONIDAZOLE 500 MG/1
500 TABLET ORAL 3 TIMES DAILY
Qty: 3 | Refills: 0 | Status: DISCONTINUED | COMMUNITY
Start: 2020-10-30 | End: 2020-12-02

## 2020-12-02 RX ORDER — FLUTICASONE PROPIONATE 50 UG/1
50 SPRAY, METERED NASAL DAILY
Qty: 1 | Refills: 2 | Status: ACTIVE | COMMUNITY
Start: 2020-12-02 | End: 1900-01-01

## 2020-12-02 NOTE — SOCIAL HISTORY
[de-identified] : lives alone  [FreeTextEntry2] :   [Apartment] : [unfilled] lives in an apartment  [Radiator/Baseboard] : heating provided by radiator(s)/baseboard(s) [Bedroom] :  in bedroom [Living Area] : in living area [None] : none [Single] : single [Smokers in Household] : there are no smokers in the home [de-identified] : air from unit on wall

## 2020-12-02 NOTE — HISTORY OF PRESENT ILLNESS
[de-identified] : Hospitalized [facility] end of September x 2 days - cardiac evaluation - PFT - she was treated with nebulizer with improvement - she followed up with pulmonary MD - lungs clear and no treatment needed.   Patient has increased nasal blockage - with exposures fleece fibers - scented soaps - strong odors - anything with fragrance - A/C turned on in the home.    Azelastine with no improvement.     Patient had hysterectomy November 2nd.

## 2020-12-02 NOTE — ASSESSMENT
[FreeTextEntry1] : Nonallergic rhinitis:\par \par Hypertonic saline irrigation with Sinus Rinse followed by \par Flonase 2 puffs QD

## 2020-12-02 NOTE — PHYSICAL EXAM
[Alert] : alert [Well Nourished] : well nourished [Healthy Appearance] : healthy appearance [No Acute Distress] : no acute distress [Well Developed] : well developed [Normal Pupil & Iris Size/Symmetry] : normal pupil and iris size and symmetry [No Discharge] : no discharge [Sclera Not Icteric] : sclera not icteric [Normal Nasal Mucosa] : the nasal mucosa was normal [Normal Lips/Tongue] : the lips and tongue were normal [Normal Tonsils] : normal tonsils [Normal Dentition] : normal dentition [No Oral Lesions or Ulcers] : no oral lesions or ulcers [Boggy Nasal Turbinates] : no boggy and/or pale nasal turbinates [Posterior Pharyngeal Cobblestoning] : no posterior pharyngeal cobblestoning [No Neck Mass] : no neck mass was observed [No LAD] : no lymphadenopathy [No Thyroid Mass] : no thyroid mass [Supple] : the neck was supple [Normal Rate and Effort] : normal respiratory rhythm and effort [No Crackles] : no crackles [No Retractions] : no retractions [Bilateral Audible Breath Sounds] : bilateral audible breath sounds [Normal Rate] : heart rate was normal  [Normal S1, S2] : normal S1 and S2 [No murmur] : no murmur [Regular Rhythm] : with a regular rhythm [Normal Cervical Lymph Nodes] : cervical [Normal Axillary Lumph Nodes] : axillary [Skin Intact] : skin intact  [No Rash] : no rash [No Skin Lesions] : no skin lesions [No Joint Swelling or Erythema] : no joint swelling or erythema [No clubbing] : no clubbing [No Edema] : no edema [No Cyanosis] : no cyanosis [Normal Mood] : mood was normal [Normal Affect] : affect was normal [Alert, Awake, Oriented as Age-Appropriate] : alert, awake, oriented as age appropriate

## 2020-12-29 ENCOUNTER — APPOINTMENT (OUTPATIENT)
Dept: GYNECOLOGIC ONCOLOGY | Facility: CLINIC | Age: 60
End: 2020-12-29
Payer: COMMERCIAL

## 2020-12-29 VITALS
WEIGHT: 103 LBS | BODY MASS INDEX: 20.76 KG/M2 | SYSTOLIC BLOOD PRESSURE: 161 MMHG | HEART RATE: 60 BPM | DIASTOLIC BLOOD PRESSURE: 84 MMHG | HEIGHT: 59 IN

## 2020-12-29 PROCEDURE — XXXXX: CPT

## 2021-08-18 NOTE — ED ADULT NURSE NOTE - NSFALLRSKINDICATORS_ED_ALL_ED
no

## 2021-09-20 NOTE — ED PROVIDER NOTE - CONSTITUTIONAL DISTRESS
Bedside/ verbal report was given to Romy [facility]. no apparent

## 2021-12-20 ENCOUNTER — EMERGENCY (EMERGENCY)
Facility: HOSPITAL | Age: 61
LOS: 1 days | Discharge: HOME | End: 2021-12-20
Attending: EMERGENCY MEDICINE | Admitting: EMERGENCY MEDICINE
Payer: COMMERCIAL

## 2021-12-20 VITALS
DIASTOLIC BLOOD PRESSURE: 74 MMHG | OXYGEN SATURATION: 100 % | RESPIRATION RATE: 18 BRPM | SYSTOLIC BLOOD PRESSURE: 141 MMHG | TEMPERATURE: 98 F | HEART RATE: 56 BPM

## 2021-12-20 VITALS
HEIGHT: 61 IN | HEART RATE: 72 BPM | OXYGEN SATURATION: 100 % | RESPIRATION RATE: 16 BRPM | TEMPERATURE: 99 F | SYSTOLIC BLOOD PRESSURE: 129 MMHG | DIASTOLIC BLOOD PRESSURE: 92 MMHG

## 2021-12-20 DIAGNOSIS — Z98.890 OTHER SPECIFIED POSTPROCEDURAL STATES: Chronic | ICD-10-CM

## 2021-12-20 LAB
ALBUMIN SERPL ELPH-MCNC: 4.6 G/DL — SIGNIFICANT CHANGE UP (ref 3.3–5)
ALP SERPL-CCNC: 82 U/L — SIGNIFICANT CHANGE UP (ref 40–120)
ALT FLD-CCNC: 11 U/L — SIGNIFICANT CHANGE UP (ref 4–33)
ANION GAP SERPL CALC-SCNC: 12 MMOL/L — SIGNIFICANT CHANGE UP (ref 7–14)
AST SERPL-CCNC: 22 U/L — SIGNIFICANT CHANGE UP (ref 4–32)
BASOPHILS # BLD AUTO: 0.04 K/UL — SIGNIFICANT CHANGE UP (ref 0–0.2)
BASOPHILS NFR BLD AUTO: 0.8 % — SIGNIFICANT CHANGE UP (ref 0–2)
BILIRUB SERPL-MCNC: 0.6 MG/DL — SIGNIFICANT CHANGE UP (ref 0.2–1.2)
BUN SERPL-MCNC: 10 MG/DL — SIGNIFICANT CHANGE UP (ref 7–23)
CALCIUM SERPL-MCNC: 9.6 MG/DL — SIGNIFICANT CHANGE UP (ref 8.4–10.5)
CHLORIDE SERPL-SCNC: 103 MMOL/L — SIGNIFICANT CHANGE UP (ref 98–107)
CO2 SERPL-SCNC: 24 MMOL/L — SIGNIFICANT CHANGE UP (ref 22–31)
CREAT SERPL-MCNC: 0.6 MG/DL — SIGNIFICANT CHANGE UP (ref 0.5–1.3)
EOSINOPHIL # BLD AUTO: 0.03 K/UL — SIGNIFICANT CHANGE UP (ref 0–0.5)
EOSINOPHIL NFR BLD AUTO: 0.6 % — SIGNIFICANT CHANGE UP (ref 0–6)
GLUCOSE SERPL-MCNC: 108 MG/DL — HIGH (ref 70–99)
HCT VFR BLD CALC: 42.1 % — SIGNIFICANT CHANGE UP (ref 34.5–45)
HGB BLD-MCNC: 14.2 G/DL — SIGNIFICANT CHANGE UP (ref 11.5–15.5)
IANC: 3.75 K/UL — SIGNIFICANT CHANGE UP (ref 1.5–8.5)
IMM GRANULOCYTES NFR BLD AUTO: 0.2 % — SIGNIFICANT CHANGE UP (ref 0–1.5)
LYMPHOCYTES # BLD AUTO: 0.9 K/UL — LOW (ref 1–3.3)
LYMPHOCYTES # BLD AUTO: 17.1 % — SIGNIFICANT CHANGE UP (ref 13–44)
MAGNESIUM SERPL-MCNC: 1.9 MG/DL — SIGNIFICANT CHANGE UP (ref 1.6–2.6)
MCHC RBC-ENTMCNC: 29.5 PG — SIGNIFICANT CHANGE UP (ref 27–34)
MCHC RBC-ENTMCNC: 33.7 GM/DL — SIGNIFICANT CHANGE UP (ref 32–36)
MCV RBC AUTO: 87.5 FL — SIGNIFICANT CHANGE UP (ref 80–100)
MONOCYTES # BLD AUTO: 0.54 K/UL — SIGNIFICANT CHANGE UP (ref 0–0.9)
MONOCYTES NFR BLD AUTO: 10.2 % — SIGNIFICANT CHANGE UP (ref 2–14)
NEUTROPHILS # BLD AUTO: 3.75 K/UL — SIGNIFICANT CHANGE UP (ref 1.8–7.4)
NEUTROPHILS NFR BLD AUTO: 71.1 % — SIGNIFICANT CHANGE UP (ref 43–77)
NRBC # BLD: 0 /100 WBCS — SIGNIFICANT CHANGE UP
NRBC # FLD: 0 K/UL — SIGNIFICANT CHANGE UP
PLATELET # BLD AUTO: 243 K/UL — SIGNIFICANT CHANGE UP (ref 150–400)
POTASSIUM SERPL-MCNC: 4.4 MMOL/L — SIGNIFICANT CHANGE UP (ref 3.5–5.3)
POTASSIUM SERPL-SCNC: 4.4 MMOL/L — SIGNIFICANT CHANGE UP (ref 3.5–5.3)
PROT SERPL-MCNC: 7.6 G/DL — SIGNIFICANT CHANGE UP (ref 6–8.3)
RBC # BLD: 4.81 M/UL — SIGNIFICANT CHANGE UP (ref 3.8–5.2)
RBC # FLD: 12.5 % — SIGNIFICANT CHANGE UP (ref 10.3–14.5)
SARS-COV-2 RNA SPEC QL NAA+PROBE: SIGNIFICANT CHANGE UP
SODIUM SERPL-SCNC: 139 MMOL/L — SIGNIFICANT CHANGE UP (ref 135–145)
TROPONIN T, HIGH SENSITIVITY RESULT: <6 NG/L — SIGNIFICANT CHANGE UP
TROPONIN T, HIGH SENSITIVITY RESULT: <6 NG/L — SIGNIFICANT CHANGE UP
WBC # BLD: 5.27 K/UL — SIGNIFICANT CHANGE UP (ref 3.8–10.5)
WBC # FLD AUTO: 5.27 K/UL — SIGNIFICANT CHANGE UP (ref 3.8–10.5)

## 2021-12-20 PROCEDURE — 93010 ELECTROCARDIOGRAM REPORT: CPT

## 2021-12-20 PROCEDURE — 71045 X-RAY EXAM CHEST 1 VIEW: CPT | Mod: 26

## 2021-12-20 PROCEDURE — 99285 EMERGENCY DEPT VISIT HI MDM: CPT | Mod: 25

## 2021-12-20 RX ORDER — SODIUM CHLORIDE 9 MG/ML
1000 INJECTION INTRAMUSCULAR; INTRAVENOUS; SUBCUTANEOUS ONCE
Refills: 0 | Status: COMPLETED | OUTPATIENT
Start: 2021-12-20 | End: 2021-12-20

## 2021-12-20 RX ADMIN — SODIUM CHLORIDE 1000 MILLILITER(S): 9 INJECTION INTRAMUSCULAR; INTRAVENOUS; SUBCUTANEOUS at 10:43

## 2021-12-20 NOTE — ED PROVIDER NOTE - ATTENDING CONTRIBUTION TO CARE
61 year old female with a history of HTN, HLD, GERD came to the ED because of palpitations. She has been under a lot of stress lately. No fever, no chest pain, no abd pain, no diaphoresis. Has a cardiologist and had workup in the past. On exam: vitals noted, rrr, lungs cta, abd soft, nt, no edema, no calf tenderness. labs, ekg and cxr noted, will dc to follow up with cardiology outpatient. Precautions reviewed.

## 2021-12-20 NOTE — ED ADULT NURSE NOTE - OBJECTIVE STATEMENT
Pt arrives to ED rm 24 A & Ox4 and ambulatory. Pt c/o palpitations beginning last night. Pt states that she received the COVID booster yesterday afternoon, by midnight last night she began experiencing constant palpitations. Hx HTN. Pt has even and unlabored respirations, clear lung sounds. NSR on cardiac monitor. Doesn't appear to be in distress. Denies headache, lightheadedness, dizziness, NVD, chest pain, SOB, chills, abd pain. 20G in RAC, labs drawn and sent. Fluids hung as ordered. Awaiting chest xray

## 2021-12-20 NOTE — ED PROVIDER NOTE - NS ED ROS FT
CONSTITUTIONAL: No weakness, fevers or chills  EYES/ENT: No visual changes;  No vertigo or throat pain   NECK: No pain or stiffness  RESPIRATORY: c/o mild SOB. No cough  CARDIOVASCULAR: c/p palpitations, chest tightness.   GASTROINTESTINAL: No abdominal or epigastric pain. No nausea, vomiting, or hematemesis; No diarrhea or constipation.   GENITOURINARY: No dysuria, frequency   NEUROLOGICAL: No numbness or weakness  SKIN: No rashes, or lesions     All other review of systems is negative unless indicated above.

## 2021-12-20 NOTE — ED PROVIDER NOTE - CLINICAL SUMMARY MEDICAL DECISION MAKING FREE TEXT BOX
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 with chest tightness and mild SOB, could not sleep overnight. VS wnl. PE wnl. Plan for labs, EKG, Observe.

## 2021-12-20 NOTE — ED PROVIDER NOTE - PHYSICAL EXAMINATION
PHYSICAL EXAM:    GENERAL: Lying in bed comfortably  HEAD:  Normocephalic  EYES: EOMI, PERRLA, conjunctiva and sclera clear  ENT: No erythema/pallor/petechiae/lesions  NECK: Supple  LUNG: CTA b/l; no r/r/w  HEART: RRR, +S1/S2; No m/r/g  ABDOMEN: soft, NT/ND; BS audible   EXTREMITIES:  2+ Peripheral Pulses, brisk cap refill. No clubbing, cyanosis, or edema  NERVOUS SYSTEM:  AAOx3, speech clear. No sensory/motor deficits   SKIN: No rashes or lesions

## 2021-12-20 NOTE — ED PROVIDER NOTE - OBJECTIVE STATEMENT
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 with chest tightness and mild SOB, could not sleep overnight.   Per detail, patient had booster at 430PM Yesterday following which had right arm soreness and felt woozy which became better. She used 2 x Tylenol.   Patient had her normal dinner and no excess caffiene/tea or other products prior to the episode.   Patient followed up with her Cardiologist Dr. Flores Gonzales ([place]), for similar concerns but milder in intensity and an EKG was done which was normal (No physical report available). Patient also describes experiencing similar episode in 2018 for which was admitted to [facility] and was told she is having muscular spasms.

## 2021-12-20 NOTE — ED PROVIDER NOTE - PATIENT PORTAL LINK FT
You can access the FollowMyHealth Patient Portal offered by [facility] by registering at the following website: [email or web address]. By joining [facility]’s FollowMyHealth portal, you will also be able to view your health information using other applications (apps) compatible with our system.

## 2021-12-20 NOTE — ED ADULT NURSE NOTE - NSIMPLEMENTINTERV_GEN_ALL_ED
Implemented All Universal Safety Interventions:  [place] to call system. Call bell, personal items and telephone within reach. Instruct patient to call for assistance. Room bathroom lighting operational. Non-slip footwear when patient is off stretcher. Physically safe environment: no spills, clutter or unnecessary equipment. Stretcher in lowest position, wheels locked, appropriate side rails in place.

## 2022-03-07 NOTE — ED ADULT NURSE NOTE - NURSING NEURO LEVEL OF CONSCIOUSNESS
Progress Note - Acute Pain Service    Henri Vo 79 y o  female MRN: [id number]  Unit/Bed#: [facility] 620-01 Encounter: [id number]      Assessment:   Principal Problem:    Closed fracture of multiple ribs of right side  Active Problems:    Traumatic pneumothorax    Closed fracture of transverse process of lumbar vertebra (HCC)    Liver contusion    Fall    Acute pain due to trauma    Right shoulder pain    Severe protein-calorie malnutrition ([address] )    Bipolar depression ([facility] [address] )    Rheumatoid arthritis ([facility] [address] )    Henri Vo is a 79 y o  female who is status post fall with right sided rib fractures 10-12  An epidural was placed on 03/03/2022 which was removed on 3/6/2022  Plan:  · Tylenol 975 mg every 8 hours scheduled  · Gabapentin 200 mg 3 times a day  · CrCl 70 7  · Lidocaine patch daily, on for 12 hours and off for 12 hours  · Robaxin 500 mg every 6 hours scheduled  · MS Contin 15 mg every 12 hours scheduled; home medication   · Oxycodone 5 mg every 4 hours as needed for moderate pain  · Oxycodone 10 mg every 4 hours as needed for severe pain  · Discontinue IV Dilaudid    Bowel management  · Senokot S 1 tablet twice a day  · MiraLax daily as needed     Pain controlled, okay for discharge from pain management standpoint  Would continue the above analgesic regimen at time of discharge  Treatment recommendations and plan of care discussed with primary care service  APS will sign off at this time  Thank you for the consult  All opioids and other analgesics to be written at discretion of primary team  Please contact Acute Pain Service - [facility] via [employer] from [phone] with additional questions or concerns  See Lyndsey or Riley for additional contacts and after hours information  Pain History  Current pain location(s): right chest wall   Pain Scale:   5-8  24 hour history:  Patient resting in bed, no acute distress    Right chest wall pain is present but is overall stable since epidural catheter has been removed  Utilizing incentive spirometer 1000ml, no acute respiratory distress  SpO2 94% on room air   P r n  Oxycodone is effective in pain reduction  Tolerating current analgesic regimen  No nausea or vomiting      Opioid requirement previous 24 hours:  IV Dilaudid 1 mg, MS Contin 30 mg, oxycodone 25 mg    Meds/Allergies   all current active meds have been reviewed and current meds:   Current Facility-Administered Medications   Medication Dose Route Frequency    acetaminophen (TYLENOL) tablet 975 mg  975 mg Oral Q8H [address]    albuterol (PROVENTIL HFA,VENTOLIN HFA) inhaler 2 puff  2 puff Inhalation Q6H PRN    buPROPion (WELLBUTRIN) tablet 450 mg  450 mg Oral Daily    carvedilol (COREG) tablet 25 mg  25 mg Oral BID With Meals    enoxaparin (LOVENOX) subcutaneous injection 30 mg  30 mg Subcutaneous Q12H [address]    gabapentin (NEURONTIN) capsule 200 mg  200 mg Oral TID    HYDROmorphone (DILAUDID) injection 0 5 mg  0 5 mg Intravenous Q4H PRN    hydroxychloroquine (PLAQUENIL) tablet 200 mg  200 mg Oral BID    lamoTRIgine (LaMICtal) tablet 150 mg  150 mg Oral HS    lidocaine (LIDODERM) 5 % patch 1 patch  1 patch Topical Daily    LORazepam (ATIVAN) tablet 1 mg  1 mg Oral HS    methocarbamol (ROBAXIN) tablet 500 mg  500 mg Oral Q6H KHARI    morphine (MS CONTIN) ER tablet 15 mg  15 mg Oral Q12H [address]    ondansetron (ZOFRAN) injection 4 mg  4 mg Intravenous Q6H PRN    oxyCODONE (ROXICODONE) immediate release tablet 10 mg  10 mg Oral Q4H PRN    oxyCODONE (ROXICODONE) IR tablet 5 mg  5 mg Oral Q4H PRN    pantoprazole (PROTONIX) EC tablet 40 mg  40 mg Oral Early Morning    polyethylene glycol (MIRALAX) packet 17 g  17 g Oral Daily PRN    ropivacaine 0 2% PCEA   Epidural Continuous    senna-docusate sodium (SENOKOT S) 8 6-50 mg per tablet 1 tablet  1 tablet Oral BID    sodium chloride (OCEAN) 0 65 % nasal spray 1 spray  1 spray Each Nare Q2H PRN       Allergies   Allergen Reactions    Penicillins Objective     Temp:  [97 6 °F (36 4 °C)-98 1 °F (36 7 °C)] 97 6 °F (36 4 °C)  HR:  [] 85  Resp:  [14-17] 16  BP: (102-113)/(50-76) 102/50    Physical Exam  Vitals reviewed  Constitutional:       General: She is awake  She is not in acute distress  Appearance: She is not ill-appearing, toxic-appearing or diaphoretic  HENT:      Head: Normocephalic  Nose: Nose normal  No rhinorrhea  Mouth/Throat:      Mouth: Mucous membranes are moist    Eyes:      Extraocular Movements: Extraocular movements intact  Pulmonary:      Effort: Pulmonary effort is normal  No tachypnea, bradypnea or respiratory distress  Skin:     Coloration: Skin is not pale  Neurological:      Mental Status: She is alert and oriented to person, place, and time  Mental status is at baseline  Psychiatric:         Attention and Perception: Attention normal          Mood and Affect: Mood normal  Mood is not anxious  Speech: Speech normal          Behavior: Behavior normal  Behavior is cooperative  Lab Results:   Results from last 7 days   Lab Units 03/05/22  0627   WBC Thousand/uL 4 53   HEMOGLOBIN g/dL 11 8   HEMATOCRIT % 36 6   PLATELETS Thousands/uL 200      Results from last 7 days   Lab Units 03/05/22  0626 03/04/22  0610 03/03/22  0545   POTASSIUM mmol/L 3 8   < > 3 8   CHLORIDE mmol/L 110*   < > 108   CO2 mmol/L 27   < > 26   BUN mg/dL 6   < > 11   CREATININE mg/dL 0 70   < > 0 93   CALCIUM mg/dL 10 5*   < > 9 8   ALK PHOS U/L  --   --  60   ALT U/L  --   --  15   AST U/L  --   --  20    < > = values in this interval not displayed  Please note that the APS provides consultative services regarding pain management only  With the exception of ketamine and epidural infusions and except when indicated, final decisions regarding starting or changing doses of analgesic medications are at the discretion of the consulting service    Off hours consultation and/or medication management is generally not available      LAURA Chung  Acute Pain Service follows commands/alert and awake

## 2022-04-12 NOTE — PROGRESS NOTE ADULT - I WAS PHYSICALLY PRESENT FOR THE KEY PORTIONS OF THE EVALUATION AND MANAGEMENT (E/M) SERVICE PROVIDED.  I AGREE WITH THE ABOVE HISTORY, PHYSICAL, AND PLAN WHICH I HAVE REVIEWED AND EDITED WHERE APPROPRIATE
Patient had ear infection on 3/5 and again yesterday, seen at [facility].  Mother was told to follow up with doctor for appt in a few weeks.    Transferred call to Pediatric Triage Line.     (Pediatric Triage Phone Number: [phone])   Statement Selected

## 2022-04-14 ENCOUNTER — EMERGENCY (EMERGENCY)
Facility: HOSPITAL | Age: 62
LOS: 1 days | Discharge: HOME | End: 2022-04-14
Attending: STUDENT IN AN ORGANIZED HEALTH CARE EDUCATION/TRAINING PROGRAM | Admitting: STUDENT IN AN ORGANIZED HEALTH CARE EDUCATION/TRAINING PROGRAM
Payer: COMMERCIAL

## 2022-04-14 VITALS
OXYGEN SATURATION: 100 % | HEIGHT: 61 IN | RESPIRATION RATE: 18 BRPM | HEART RATE: 66 BPM | TEMPERATURE: 98 F | DIASTOLIC BLOOD PRESSURE: 84 MMHG | SYSTOLIC BLOOD PRESSURE: 151 MMHG

## 2022-04-14 VITALS
OXYGEN SATURATION: 100 % | HEART RATE: 64 BPM | SYSTOLIC BLOOD PRESSURE: 135 MMHG | DIASTOLIC BLOOD PRESSURE: 86 MMHG | TEMPERATURE: 98 F | RESPIRATION RATE: 16 BRPM

## 2022-04-14 DIAGNOSIS — Z98.890 OTHER SPECIFIED POSTPROCEDURAL STATES: Chronic | ICD-10-CM

## 2022-04-14 LAB
ALBUMIN SERPL ELPH-MCNC: 4.7 G/DL — SIGNIFICANT CHANGE UP (ref 3.3–5)
ALP SERPL-CCNC: 80 U/L — SIGNIFICANT CHANGE UP (ref 40–120)
ALT FLD-CCNC: 11 U/L — SIGNIFICANT CHANGE UP (ref 4–33)
ANION GAP SERPL CALC-SCNC: 12 MMOL/L — SIGNIFICANT CHANGE UP (ref 7–14)
APPEARANCE UR: CLEAR — SIGNIFICANT CHANGE UP
AST SERPL-CCNC: 16 U/L — SIGNIFICANT CHANGE UP (ref 4–32)
BACTERIA # UR AUTO: NEGATIVE — SIGNIFICANT CHANGE UP
BASE EXCESS BLDV CALC-SCNC: 2.2 MMOL/L — SIGNIFICANT CHANGE UP (ref -2–3)
BASOPHILS # BLD AUTO: 0.04 K/UL — SIGNIFICANT CHANGE UP (ref 0–0.2)
BASOPHILS NFR BLD AUTO: 0.8 % — SIGNIFICANT CHANGE UP (ref 0–2)
BILIRUB SERPL-MCNC: 0.5 MG/DL — SIGNIFICANT CHANGE UP (ref 0.2–1.2)
BILIRUB UR-MCNC: NEGATIVE — SIGNIFICANT CHANGE UP
BLOOD GAS VENOUS COMPREHENSIVE RESULT: SIGNIFICANT CHANGE UP
BUN SERPL-MCNC: 14 MG/DL — SIGNIFICANT CHANGE UP (ref 7–23)
CALCIUM SERPL-MCNC: 9.8 MG/DL — SIGNIFICANT CHANGE UP (ref 8.4–10.5)
CHLORIDE BLDV-SCNC: 103 MMOL/L — SIGNIFICANT CHANGE UP (ref 96–108)
CHLORIDE SERPL-SCNC: 100 MMOL/L — SIGNIFICANT CHANGE UP (ref 98–107)
CO2 BLDV-SCNC: 28.6 MMOL/L — HIGH (ref 22–26)
CO2 SERPL-SCNC: 24 MMOL/L — SIGNIFICANT CHANGE UP (ref 22–31)
COLOR SPEC: SIGNIFICANT CHANGE UP
CREAT SERPL-MCNC: 0.75 MG/DL — SIGNIFICANT CHANGE UP (ref 0.5–1.3)
DIFF PNL FLD: ABNORMAL
EGFR: 91 ML/MIN/1.73M2 — SIGNIFICANT CHANGE UP
EOSINOPHIL # BLD AUTO: 0.02 K/UL — SIGNIFICANT CHANGE UP (ref 0–0.5)
EOSINOPHIL NFR BLD AUTO: 0.4 % — SIGNIFICANT CHANGE UP (ref 0–6)
EPI CELLS # UR: 0 /HPF — SIGNIFICANT CHANGE UP (ref 0–5)
GAS PNL BLDV: 136 MMOL/L — SIGNIFICANT CHANGE UP (ref 136–145)
GAS PNL BLDV: SIGNIFICANT CHANGE UP
GLUCOSE BLDV-MCNC: 88 MG/DL — SIGNIFICANT CHANGE UP (ref 70–99)
GLUCOSE SERPL-MCNC: 90 MG/DL — SIGNIFICANT CHANGE UP (ref 70–99)
GLUCOSE UR QL: NEGATIVE — SIGNIFICANT CHANGE UP
HCO3 BLDV-SCNC: 27 MMOL/L — SIGNIFICANT CHANGE UP (ref 22–29)
HCT VFR BLD CALC: 41.7 % — SIGNIFICANT CHANGE UP (ref 34.5–45)
HCT VFR BLDA CALC: 43 % — SIGNIFICANT CHANGE UP (ref 34.5–46.5)
HGB BLD CALC-MCNC: 14.2 G/DL — SIGNIFICANT CHANGE UP (ref 11.5–15.5)
HGB BLD-MCNC: 14.1 G/DL — SIGNIFICANT CHANGE UP (ref 11.5–15.5)
HYALINE CASTS # UR AUTO: 1 /LPF — SIGNIFICANT CHANGE UP (ref 0–7)
IANC: 3.22 K/UL — SIGNIFICANT CHANGE UP (ref 1.8–7.4)
IMM GRANULOCYTES NFR BLD AUTO: 0.2 % — SIGNIFICANT CHANGE UP (ref 0–1.5)
KETONES UR-MCNC: NEGATIVE — SIGNIFICANT CHANGE UP
LACTATE BLDV-MCNC: 1.1 MMOL/L — SIGNIFICANT CHANGE UP (ref 0.5–2)
LEUKOCYTE ESTERASE UR-ACNC: NEGATIVE — SIGNIFICANT CHANGE UP
LIDOCAIN IGE QN: 24 U/L — SIGNIFICANT CHANGE UP (ref 7–60)
LYMPHOCYTES # BLD AUTO: 1.51 K/UL — SIGNIFICANT CHANGE UP (ref 1–3.3)
LYMPHOCYTES # BLD AUTO: 29.3 % — SIGNIFICANT CHANGE UP (ref 13–44)
MCHC RBC-ENTMCNC: 29.4 PG — SIGNIFICANT CHANGE UP (ref 27–34)
MCHC RBC-ENTMCNC: 33.8 GM/DL — SIGNIFICANT CHANGE UP (ref 32–36)
MCV RBC AUTO: 87.1 FL — SIGNIFICANT CHANGE UP (ref 80–100)
MONOCYTES # BLD AUTO: 0.36 K/UL — SIGNIFICANT CHANGE UP (ref 0–0.9)
MONOCYTES NFR BLD AUTO: 7 % — SIGNIFICANT CHANGE UP (ref 2–14)
NEUTROPHILS # BLD AUTO: 3.22 K/UL — SIGNIFICANT CHANGE UP (ref 1.8–7.4)
NEUTROPHILS NFR BLD AUTO: 62.3 % — SIGNIFICANT CHANGE UP (ref 43–77)
NITRITE UR-MCNC: NEGATIVE — SIGNIFICANT CHANGE UP
NRBC # BLD: 0 /100 WBCS — SIGNIFICANT CHANGE UP
NRBC # FLD: 0 K/UL — SIGNIFICANT CHANGE UP
PCO2 BLDV: 43 MMHG — HIGH (ref 39–42)
PH BLDV: 7.41 — SIGNIFICANT CHANGE UP (ref 7.32–7.43)
PH UR: 6 — SIGNIFICANT CHANGE UP (ref 5–8)
PLATELET # BLD AUTO: 250 K/UL — SIGNIFICANT CHANGE UP (ref 150–400)
PO2 BLDV: 36 MMHG — SIGNIFICANT CHANGE UP
POTASSIUM BLDV-SCNC: 3.8 MMOL/L — SIGNIFICANT CHANGE UP (ref 3.5–5.1)
POTASSIUM SERPL-MCNC: 3.9 MMOL/L — SIGNIFICANT CHANGE UP (ref 3.5–5.3)
POTASSIUM SERPL-SCNC: 3.9 MMOL/L — SIGNIFICANT CHANGE UP (ref 3.5–5.3)
PROT SERPL-MCNC: 7.6 G/DL — SIGNIFICANT CHANGE UP (ref 6–8.3)
PROT UR-MCNC: NEGATIVE — SIGNIFICANT CHANGE UP
RBC # BLD: 4.79 M/UL — SIGNIFICANT CHANGE UP (ref 3.8–5.2)
RBC # FLD: 12.6 % — SIGNIFICANT CHANGE UP (ref 10.3–14.5)
RBC CASTS # UR COMP ASSIST: 3 /HPF — SIGNIFICANT CHANGE UP (ref 0–4)
SAO2 % BLDV: 59.2 % — SIGNIFICANT CHANGE UP
SODIUM SERPL-SCNC: 136 MMOL/L — SIGNIFICANT CHANGE UP (ref 135–145)
SP GR SPEC: 1.02 — SIGNIFICANT CHANGE UP (ref 1–1.05)
UROBILINOGEN FLD QL: SIGNIFICANT CHANGE UP
WBC # BLD: 5.16 K/UL — SIGNIFICANT CHANGE UP (ref 3.8–10.5)
WBC # FLD AUTO: 5.16 K/UL — SIGNIFICANT CHANGE UP (ref 3.8–10.5)
WBC UR QL: 1 /HPF — SIGNIFICANT CHANGE UP (ref 0–5)

## 2022-04-14 PROCEDURE — 93010 ELECTROCARDIOGRAM REPORT: CPT

## 2022-04-14 PROCEDURE — 74177 CT ABD & PELVIS W/CONTRAST: CPT | Mod: 26,MA

## 2022-04-14 PROCEDURE — 99285 EMERGENCY DEPT VISIT HI MDM: CPT | Mod: 25

## 2022-04-14 NOTE — ED PROVIDER NOTE - NS ED ATTENDING STATEMENT MOD
I understand the patient is frustrated, but it has been less than 24 hours since her initial message and she needs to be aware of our office's expectation with responding to messages. I have a full patient schedule and her message was addressed in a reasonably timely manner as soon as I was able to get to it.     I reviewed patient's extensive message she sent.      If she is really that concerned and having such severe symptoms that she cannot even walk a short distance without her heart rate elevating and feels like she is weak and faint, then she must go to the Emergency Department. There is absolutely nothing I can offer her from a clinical standpoint to be done in our clinic. The acute shortness of breath could certainly be a sign of anemia, but is concerning for other cardiopulmonary etiologies.     I also understand that one of our nurses recommending patient go to the ED and she is refusing.     All of her symptoms correlate with anemia. I understand her most recent Hgb was quite normal, however, it appears this severe rectal bleeding has been occurring the last four days and she may have experienced an acute drop in blood count due to acute blood loss. Again, there is nothing I can do for her in our clinic and this needs to be addressed elsewhere.     I reviewed her records and our last office visit note. Patient reported NO recurrence in rectal bleeding after undergoing hemorrhoidal banding on 08/10/2019.    We can offer her to be scheduled to undergo repeat hemorrhoid banding, or she may require more urgent scoping of the entire colon to assess for a bleeding source (coordinated through the ED or through my office -- depending on how soon our providers could get her in).     She should not have stopped her iron supplementation with her extensive history of iron deficiency anemia. She was advised to continue iron supplementation at last office visit.     Lastly, if patient wishes to discuss her diet in  such detail, then I am recommending a referral to Nutrition Counseling so she can be evaluated and managed by a Registered Dietician, since they are the experts and will be able to provide her with more comprehensive dietary management that I would be able to do.    This was a shared visit with the [facility]. I reviewed and verified the documentation and independently performed the documented:

## 2022-04-14 NOTE — ED PROVIDER NOTE - CLINICAL SUMMARY MEDICAL DECISION MAKING FREE TEXT BOX
61 year old female with PMH of OA, GERD, HLD, HTN presents to the ED complaining of abdominal pain since last night. HD stable. Imp: abdominal pain, Rt CVAT/RLQ, no peritoneal signs; r/o renal stone vs appendicitis vs biliary colic. Plan for labs, CT A/P, UA, analgesics, reassess.

## 2022-04-14 NOTE — ED PROVIDER NOTE - OBJECTIVE STATEMENT
61 year old female with PMH of OA, GERD, HLD, HTN presents to the ED complaining of abdominal pain since last night. Pt states right before bedtime last night she experienced diffuse abdominal pain, had a BM, felt better and went to sleep; but this morning she woke up with right flank pain radiating to the RLQ, constant, no relieving or aggravating factors. Denies associated nausea, vomiting, diarrhea, constipation, melena, hematochezia, fevers and chills, dysuria, urinary, and urinary/frequency. Denies any other complaints.

## 2022-04-14 NOTE — ED PROVIDER NOTE - PROGRESS NOTE DETAILS
BIANCA Pelayo: Labs/UA reviewed. CT A/P no acute findings. Pt reassessed, states she feels better. abdomen soft nontender. tolerating oral intake. stable for dc home. PMD follow up. Strict return precautions.

## 2022-04-14 NOTE — ED ADULT NURSE NOTE - NS ED NURSE LEVEL OF CONSCIOUSNESS ORIENTATION
Oriented - self; Oriented - place; Oriented - time Vaccinations/[facility]  Screening Program/Bottle Feeding Log/[facility] Hearing Screen Program/  Immunization Record/Tdap Vaccination (VIS Pub Date: 2012)/Breastfeeding Log/Breastfeeding Mother’s Support Group Information/Birth Certificate Instructions/Discharge Medication Information for Patients and Families Pocket Guide/Guide to Postpartum Care/Breastfeeding Guide and Packet/Back To Sleep Handout/Shaken Baby Prevention Handout

## 2022-04-14 NOTE — ED PROVIDER NOTE - ATTENDING CONTRIBUTION TO CARE
I have personally performed a face to face medical and diagnostic evaluation of the patient. I have discussed with and reviewed the [facility]'s note and agree with the History, ROS, Physical Exam and MDM unless otherwise indicated. A brief summary of my personal evaluation and impression can be found below.    61F hx of hysterectomy hx of OA GERD HTN HLD presents with a cc of R abdomina pain a/w R flank pain since last night, noted burping and thought was having stomach and chest discomfort c/w gerd, that has resolved but now is having pain to RLQ constant a.w R flank lower back discomfort. No urinary sx. No vaginal discharge or bleeding. No nausea vomiting or fever. +mild headache.     All other ROS negative, except as above and as per HPI and ROS section.    VITALS: Initial triage and subsequent vitals have been reviewed by me.  GEN APPEARANCE: WDWN, alert, non-toxic, NAD  HEAD: Atraumatic.  EYES: PERRLa, EOMI, vision grossly intact.   NECK: Supple  CV: RRR, S1S2, no c/r/m/g. Cap refill <2 seconds. No bruits.   LUNGS: CTAB. No abnormal breath sounds.  ABDOMEN: Soft, NTND. No guarding or rebound.   MSK/EXT: No spinal or extremity point tenderness. mild +R CVA ttp. Pelvis stable. No peripheral edema.  NEURO: Alert, follows commands. Weight bearing normal. Speech normal. Sensation and motor normal x4 extremities.   SKIN: Warm, dry and intact. No rash.  PSYCH: Appropriate    Plan/MDM: 61F hx of hysterectomy hx of OA GERD HTN HLD presents with a cc of R abdomina pain a/w R flank pain since last night, noted burping and thought was having stomach and chest discomfort c/w gerd, that has resolved but now is having pain to RLQ constant a.w R flank lower back discomfort. No urinary sx. No vaginal discharge or bleeding exam vss non toxic PE S above ddx c/f appendicitis v colitis vs pyelo less c/f pelvic pathology plan to check labs screening ekg ctap urine meds as needed and reassess.

## 2022-04-14 NOTE — ED PROVIDER NOTE - PATIENT PORTAL LINK FT
You can access the FollowMyHealth Patient Portal offered by [facility] by registering at the following website: [email or web address]. By joining [employer]’s FollowMyHealth portal, you will also be able to view your health information using other applications (apps) compatible with our system.

## 2022-04-14 NOTE — ED ADULT NURSE NOTE - OBJECTIVE STATEMENT
Pt presents to ED ambulatory with steady gait c/o lower abdominal pain and right flank pain that radiates to right groin that started last night. denies nausea, vomiting, diarrhea, or urinary sx. DELL Aguilar

## 2022-04-15 LAB
CULTURE RESULTS: SIGNIFICANT CHANGE UP
SPECIMEN SOURCE: SIGNIFICANT CHANGE UP

## 2022-06-28 ENCOUNTER — APPOINTMENT (OUTPATIENT)
Dept: CT IMAGING | Facility: CLINIC | Age: 62
End: 2022-06-28

## 2022-06-28 ENCOUNTER — OUTPATIENT (OUTPATIENT)
Dept: OUTPATIENT SERVICES | Facility: HOSPITAL | Age: 62
LOS: 1 days | End: 2022-06-28
Payer: COMMERCIAL

## 2022-06-28 DIAGNOSIS — Z00.8 ENCOUNTER FOR OTHER GENERAL EXAMINATION: ICD-10-CM

## 2022-06-28 DIAGNOSIS — Z98.890 OTHER SPECIFIED POSTPROCEDURAL STATES: Chronic | ICD-10-CM

## 2022-06-28 PROCEDURE — 70480 CT ORBIT/EAR/FOSSA W/O DYE: CPT

## 2022-06-28 PROCEDURE — 70480 CT ORBIT/EAR/FOSSA W/O DYE: CPT | Mod: 26

## 2022-08-15 NOTE — DISCHARGE NOTE ADULT - SECONDARY DIAGNOSIS.
Hypertension Silver Nitrate Text: The wound bed was treated with silver nitrate after the biopsy was performed.

## 2022-08-27 ENCOUNTER — EMERGENCY (EMERGENCY)
Facility: HOSPITAL | Age: 62
LOS: 1 days | Discharge: HOME | End: 2022-08-27
Attending: EMERGENCY MEDICINE | Admitting: EMERGENCY MEDICINE

## 2022-08-27 VITALS
RESPIRATION RATE: 16 BRPM | OXYGEN SATURATION: 100 % | DIASTOLIC BLOOD PRESSURE: 67 MMHG | HEIGHT: 61 IN | TEMPERATURE: 98 F | HEART RATE: 61 BPM | SYSTOLIC BLOOD PRESSURE: 164 MMHG

## 2022-08-27 VITALS
OXYGEN SATURATION: 100 % | DIASTOLIC BLOOD PRESSURE: 76 MMHG | SYSTOLIC BLOOD PRESSURE: 153 MMHG | RESPIRATION RATE: 16 BRPM | HEART RATE: 47 BPM

## 2022-08-27 DIAGNOSIS — Z98.890 OTHER SPECIFIED POSTPROCEDURAL STATES: Chronic | ICD-10-CM

## 2022-08-27 LAB
ALBUMIN SERPL ELPH-MCNC: 4.9 G/DL — SIGNIFICANT CHANGE UP (ref 3.3–5)
ALP SERPL-CCNC: 90 U/L — SIGNIFICANT CHANGE UP (ref 40–120)
ALT FLD-CCNC: 13 U/L — SIGNIFICANT CHANGE UP (ref 4–33)
ANION GAP SERPL CALC-SCNC: 12 MMOL/L — SIGNIFICANT CHANGE UP (ref 7–14)
APPEARANCE UR: CLEAR — SIGNIFICANT CHANGE UP
APTT BLD: 31.2 SEC — SIGNIFICANT CHANGE UP (ref 27–36.3)
AST SERPL-CCNC: 21 U/L — SIGNIFICANT CHANGE UP (ref 4–32)
BASOPHILS # BLD AUTO: 0.03 K/UL — SIGNIFICANT CHANGE UP (ref 0–0.2)
BASOPHILS NFR BLD AUTO: 0.5 % — SIGNIFICANT CHANGE UP (ref 0–2)
BILIRUB SERPL-MCNC: 0.4 MG/DL — SIGNIFICANT CHANGE UP (ref 0.2–1.2)
BILIRUB UR-MCNC: NEGATIVE — SIGNIFICANT CHANGE UP
BLOOD GAS VENOUS COMPREHENSIVE RESULT: SIGNIFICANT CHANGE UP
BUN SERPL-MCNC: 10 MG/DL — SIGNIFICANT CHANGE UP (ref 7–23)
CALCIUM SERPL-MCNC: 9.8 MG/DL — SIGNIFICANT CHANGE UP (ref 8.4–10.5)
CHLORIDE SERPL-SCNC: 102 MMOL/L — SIGNIFICANT CHANGE UP (ref 98–107)
CO2 SERPL-SCNC: 27 MMOL/L — SIGNIFICANT CHANGE UP (ref 22–31)
COLOR SPEC: COLORLESS — SIGNIFICANT CHANGE UP
CREAT SERPL-MCNC: 0.64 MG/DL — SIGNIFICANT CHANGE UP (ref 0.5–1.3)
DIFF PNL FLD: NEGATIVE — SIGNIFICANT CHANGE UP
EGFR: 100 ML/MIN/1.73M2 — SIGNIFICANT CHANGE UP
EOSINOPHIL # BLD AUTO: 0.03 K/UL — SIGNIFICANT CHANGE UP (ref 0–0.5)
EOSINOPHIL NFR BLD AUTO: 0.5 % — SIGNIFICANT CHANGE UP (ref 0–6)
FLUAV AG NPH QL: SIGNIFICANT CHANGE UP
FLUBV AG NPH QL: SIGNIFICANT CHANGE UP
GLUCOSE SERPL-MCNC: 95 MG/DL — SIGNIFICANT CHANGE UP (ref 70–99)
GLUCOSE UR QL: NEGATIVE — SIGNIFICANT CHANGE UP
HCT VFR BLD CALC: 42.4 % — SIGNIFICANT CHANGE UP (ref 34.5–45)
HGB BLD-MCNC: 14.1 G/DL — SIGNIFICANT CHANGE UP (ref 11.5–15.5)
IANC: 4.08 K/UL — SIGNIFICANT CHANGE UP (ref 1.8–7.4)
IMM GRANULOCYTES NFR BLD AUTO: 0.3 % — SIGNIFICANT CHANGE UP (ref 0–1.5)
INR BLD: 0.99 RATIO — SIGNIFICANT CHANGE UP (ref 0.88–1.16)
KETONES UR-MCNC: NEGATIVE — SIGNIFICANT CHANGE UP
LEUKOCYTE ESTERASE UR-ACNC: NEGATIVE — SIGNIFICANT CHANGE UP
LYMPHOCYTES # BLD AUTO: 1.39 K/UL — SIGNIFICANT CHANGE UP (ref 1–3.3)
LYMPHOCYTES # BLD AUTO: 22.9 % — SIGNIFICANT CHANGE UP (ref 13–44)
MCHC RBC-ENTMCNC: 29.4 PG — SIGNIFICANT CHANGE UP (ref 27–34)
MCHC RBC-ENTMCNC: 33.3 GM/DL — SIGNIFICANT CHANGE UP (ref 32–36)
MCV RBC AUTO: 88.5 FL — SIGNIFICANT CHANGE UP (ref 80–100)
MONOCYTES # BLD AUTO: 0.51 K/UL — SIGNIFICANT CHANGE UP (ref 0–0.9)
MONOCYTES NFR BLD AUTO: 8.4 % — SIGNIFICANT CHANGE UP (ref 2–14)
NEUTROPHILS # BLD AUTO: 4.08 K/UL — SIGNIFICANT CHANGE UP (ref 1.8–7.4)
NEUTROPHILS NFR BLD AUTO: 67.4 % — SIGNIFICANT CHANGE UP (ref 43–77)
NITRITE UR-MCNC: NEGATIVE — SIGNIFICANT CHANGE UP
NRBC # BLD: 0 /100 WBCS — SIGNIFICANT CHANGE UP (ref 0–0)
NRBC # FLD: 0 K/UL — SIGNIFICANT CHANGE UP (ref 0–0)
PH UR: 7 — SIGNIFICANT CHANGE UP (ref 5–8)
PLATELET # BLD AUTO: 257 K/UL — SIGNIFICANT CHANGE UP (ref 150–400)
POTASSIUM SERPL-MCNC: 4 MMOL/L — SIGNIFICANT CHANGE UP (ref 3.5–5.3)
POTASSIUM SERPL-SCNC: 4 MMOL/L — SIGNIFICANT CHANGE UP (ref 3.5–5.3)
PROT SERPL-MCNC: 7.9 G/DL — SIGNIFICANT CHANGE UP (ref 6–8.3)
PROT UR-MCNC: NEGATIVE — SIGNIFICANT CHANGE UP
PROTHROM AB SERPL-ACNC: 11.5 SEC — SIGNIFICANT CHANGE UP (ref 10.5–13.4)
RBC # BLD: 4.79 M/UL — SIGNIFICANT CHANGE UP (ref 3.8–5.2)
RBC # FLD: 12.3 % — SIGNIFICANT CHANGE UP (ref 10.3–14.5)
RBC CASTS # UR COMP ASSIST: SIGNIFICANT CHANGE UP /HPF (ref 0–4)
RSV RNA NPH QL NAA+NON-PROBE: SIGNIFICANT CHANGE UP
SARS-COV-2 RNA SPEC QL NAA+PROBE: SIGNIFICANT CHANGE UP
SODIUM SERPL-SCNC: 141 MMOL/L — SIGNIFICANT CHANGE UP (ref 135–145)
SP GR SPEC: 1.02 — SIGNIFICANT CHANGE UP (ref 1.01–1.05)
TROPONIN T, HIGH SENSITIVITY RESULT: <6 NG/L — SIGNIFICANT CHANGE UP
TSH SERPL-MCNC: 0.93 UIU/ML — SIGNIFICANT CHANGE UP (ref 0.27–4.2)
UROBILINOGEN FLD QL: SIGNIFICANT CHANGE UP
WBC # BLD: 6.06 K/UL — SIGNIFICANT CHANGE UP (ref 3.8–10.5)
WBC # FLD AUTO: 6.06 K/UL — SIGNIFICANT CHANGE UP (ref 3.8–10.5)
WBC UR QL: SIGNIFICANT CHANGE UP /HPF (ref 0–5)

## 2022-08-27 PROCEDURE — 93010 ELECTROCARDIOGRAM REPORT: CPT

## 2022-08-27 PROCEDURE — 70498 CT ANGIOGRAPHY NECK: CPT | Mod: 26,MA

## 2022-08-27 PROCEDURE — 99285 EMERGENCY DEPT VISIT HI MDM: CPT | Mod: 25

## 2022-08-27 PROCEDURE — 70496 CT ANGIOGRAPHY HEAD: CPT | Mod: 26,MA

## 2022-08-27 PROCEDURE — 99284 EMERGENCY DEPT VISIT MOD MDM: CPT

## 2022-08-27 RX ORDER — ACETAMINOPHEN 500 MG
650 TABLET ORAL ONCE
Refills: 0 | Status: COMPLETED | OUTPATIENT
Start: 2022-08-27 | End: 2022-08-27

## 2022-08-27 NOTE — ED PROVIDER NOTE - OBJECTIVE STATEMENT
63yo F hx of HTN, HLD, GERD, TIA, comes to ED for numbness. Pt w/ L arm and leg numbness for 1.5 weeks. States she has had this before in 2018 and diagnosed with UTI. Pt also states her arm felt a little weak in terms of . Pt otherwise denies fever, cp, sob, abdominal pain, change in urine or bowel. 63yo F hx of HTN, HLD, GERD, TIA, comes to ED for numbness. Pt w/ L arm and leg numbness for 1.5 weeks. States she has had this before in 2018 and diagnosed with UTI. Pt also states her arm felt a little weak in terms of . Pt otherwise denies fever, cp, sob, abdominal pain, change in urine or bowel. Pt states that she has arthritis of her neck and low back.

## 2022-08-27 NOTE — ED ADULT TRIAGE NOTE - CHIEF COMPLAINT QUOTE
presents with dizziness, headache, +left-sided numbness and weakness that started today around 830am today. GIULIANO notified. CODE STROKE called. FS 85. pMHX HTN, Vertigo, HLD, TIA

## 2022-08-27 NOTE — CONSULT NOTE ADULT - ASSESSMENT
62y (1960) woman with a PMHx significant for OA, HLD, HTN, TIA s/p chance trial in 2019 with incidental finding of L A2 aneurysm being followed by Dr. Dwight Cruz o/p Neurology, vertigo, and chronic HA, who presented to the ED with LLE numbness. Pt stated that for the past month shes had numbness intermittent numbness in the LLE and LUE. Pt an MRI of L spine which her Neurologist stated to may be causing the LLE symptoms. This morning pt stated that her LLE numbness worsened while she was driving, which promoted her to go to the ED. Pt endorses a generalized HA, lightheadedness, and blurry vision described as seeing fuzzy which lasted a few moments and self resolved, but denied any weakness, changes in hearing, gait instability, room spinning or seizure. Pt stated that she regularly gets MRIs for her Aneurysm and "everything has come back good so far".     Impression:    L hemisensory changes of unknown etiology, r/o stroke     Recommendation:  [] CTA to assess any vascular changes   [] CT Head   [] C/w PTA asa and statin   []Toxic metabolic work up per primary team     Case to be discussed with and seen by Dr. Fernández

## 2022-08-27 NOTE — ED ADULT NURSE NOTE - OBJECTIVE STATEMENT
Bolivar RN: Pt received to CT as code stroke A&OX4 ambulatory c/o worsening numbness/weakness to LUE and LLE. Pt states she was unable to move L side of body today at 9 AM. Verbalizing improvement in symptoms since arriving to ED. Pt able to get up from chair and stand without assistance. No facial droop/slurred speech noted. Neuro at bedside. Bolivar RN: Pt received to CT as code stroke A&OX4 ambulatory c/o worsening numbness/weakness to LUE and LLE x 1 month. Pt states she was unable to move L side of body today at 9 AM. Verbalizing improvement in symptoms since arriving to ED. Pt able to get up from chair and stand without assistance. No facial droop/slurred speech noted. 20G IV placed in RAC. Labs drawn and sent. Neuro at bedside. Code stroke cancelled. Report given to primary RN Kathleen.

## 2022-08-27 NOTE — ED ADULT NURSE REASSESSMENT NOTE - NS ED NURSE REASSESS COMMENT FT1
pt arrived to  2 A&Ox4 and ambulatory. as per pt, symptoms started @830 AM, HA, L upper and lower extremity weakness, numbness, and tingling. pt  strength slightly weaker in L hand than right. equal pedal strength B/L.  equal sensation B/L in upper and lower extremities. pt placed on cardiac monitor. VSS. NSR noted on monitor. respirations even and unlabored. awaiting results.

## 2022-08-27 NOTE — ED PROVIDER NOTE - CLINICAL SUMMARY MEDICAL DECISION MAKING FREE TEXT BOX
Pt w/ previous TIA here for 1.5 weeks of L sided numbness. VSS. Exam w/o deficits but tenderness of the trapezius w/ axial loading of the head and L sided straight leg raise positive. Doubt CVA but w/ hx of known aneurysms will obtain CTAs. Symptoms given her exam and reported "arthritis" of the back and neck, likely radicular. Labs, CTs. Reassess.

## 2022-08-27 NOTE — ED ADULT TRIAGE NOTE - MEANS OF ARRIVAL
Slit Excision Additional Text (Leave Blank If You Do Not Want): A linear line was drawn on the skin overlying the lesion. An incision was made slowly until the lesion was visualized.  Once visualized, the lesion was removed with blunt dissection. ambulatory

## 2022-08-27 NOTE — CONSULT NOTE ADULT - ATTENDING COMMENTS
Fluctuating numbness on the left side for several years.  Dx with TIA in the past and placed on ASA and statin.  She currently does not take statin or aspirin regularly.  Side effects on simvastatin.  She came in because increase in numbness on the left side.  She also has had intermittent left LBP, groin pain and left leg pain.     Exam:  Motor: normal - 5/5 throughout.  RSM - sym.  No pronator drift.   Decreased sensation on the left side - F/A/L.    < from: CT Angio Neck w/ IV Cont (08.27.22 @ 13:50) >    HEAD CT:  No hydrocephalus, mass effect, midline shift, acute   intracranial hemorrhage, or brain edema. Enlarged right lacrimal gland   not significant changed from 6/28/2022, may suggest neoplasm. Consider   MRI of the brain with contrast as clinically warranted.    CTA COW:  Patent intracranial circulation without flow limiting stenosis.   Unchanged small aneurysm of the A2 segment.    CTA NECK: Patent, CCAs, ECAs, ICAs, no  hemodynamically significant   stenosis at  ICA origins by NASCET criteria.  Bilateral vertebral arteries are patent without flow limiting stenosis.    < end of copied text >        A/P  Ms. Infante is a 63 yo woman with h/o TIA and many years of fluctuating numbness on the left side with increased numbness.   No acute neurological event now and exam is at her usual baseline.   We counselled her on stroke risk reduction - aspirin, statin, management of HTN and f/u DM screening.   She will f/u with her own neurologist in the next week.  F/U with PMD for right lacrimal gland finding on imaging.     Thank you Fluctuating numbness on the left side for several years.  Dx with TIA in the past and placed on ASA and statin.  She currently does not take statin or aspirin regularly.  Side effects on simvastatin.  She came in because increase in numbness on the left side.  She also has had intermittent left LBP, groin pain and left leg pain.     Exam:  Motor: normal - 5/5 throughout.  RSM - sym.  No pronator drift.   Decreased sensation on the left side - F/A/L.    < from: CT Angio Neck w/ IV Cont (08.27.22 @ 13:50) >    HEAD CT:  No hydrocephalus, mass effect, midline shift, acute   intracranial hemorrhage, or brain edema. Enlarged right lacrimal gland   not significant changed from 6/28/2022, may suggest neoplasm. Consider   MRI of the brain with contrast as clinically warranted.    CTA COW:  Patent intracranial circulation without flow limiting stenosis.   Unchanged small aneurysm of the A2 segment.    CTA NECK: Patent, CCAs, ECAs, ICAs, no  hemodynamically significant   stenosis at  ICA origins by NASCET criteria.  Bilateral vertebral arteries are patent without flow limiting stenosis.    < end of copied text >        A/P  Ms. Infante is a 63 yo woman with h/o TIA and many years of fluctuating numbness on the left side with increased numbness.   No acute neurological event now and exam is at her usual baseline.   We counselled her on stroke risk reduction - aspirin, statin, management of HTN and f/u DM screening.   She will f/u with her own neurologist in the next week.  F/U with PMD for right lacrimal gland finding on imaging.     Thank you    Please call us for any further questions.

## 2022-08-27 NOTE — ED PROVIDER NOTE - PATIENT PORTAL LINK FT
You can access the FollowMyHealth Patient Portal offered by [facility] by registering at the following website: [email or web address]. By joining [employer]’s FollowMyHealth portal, you will also be able to view your health information using other applications (apps) compatible with our system.

## 2022-08-27 NOTE — ED PROVIDER NOTE - PROGRESS NOTE DETAILS
ALECIA Ely (PGY-3) - per neuro, unlikely stroke. Labs not actionable, CTs negative for acute pathology. Pt at her baseline, will follow up with her neurologist.

## 2022-08-27 NOTE — CONSULT NOTE ADULT - SUBJECTIVE AND OBJECTIVE BOX
Neurology - Consult Note    -  Spectra: [phone] ([facility]), [phone] ([facility])  -    HPI: Patient ELIAN JOHNSON is a 62y (1960) woman with a PMHx significant for OA, HLD, HTN, TIA s/p chance trial in 2019 with incidental finding of L A2 aneurysm being followed by Dr. Dwight Cruz o/p Neurology, vertigo, and chronic HA, who presented to the ED with LLE numbness. Pt stated that for the past month shes had numbness intermittent numbness in the LLE and LUE. Pt an MRI of L spine which her Neurologist stated to may be causing the LLE symptoms. This morning pt stated that her LLE numbness worsened while she was driving, which promoted her to go to the ED. Pt endorses a generalized HA, lightheadedness, and blurry vision described as seeing fuzzy which lasted a few moments and self resolved, but denied any weakness, changes in hearing, gait instability, room spinning or seizure. Pt stated that she regularly gets MRIs for her Aneurysm and "everything has come back good so far".       Review of Systems:    CONSTITUTIONAL: No fevers or chills  EYES AND ENT: No visual changes or no throat pain   NECK: No pain or stiffness  RESPIRATORY: No hemoptysis or shortness of breath  CARDIOVASCULAR: No chest pain or palpitations  GASTROINTESTINAL: No melena or hematochezia  GENITOURINARY: No dysuria or hematuria  NEUROLOGICAL: +As stated in HPI above  SKIN: No itching, burning, rashes, or lesions   All other review of systems is negative unless indicated above.    Allergies:      PMHx/PSHx/Family Hx: As above, otherwise see below   Hypertension    Hyperlipemia    GERD (gastroesophageal reflux disease)    History of TIAs    OA (osteoarthritis)    Fibroid uterus      Medications:  MEDICATIONS  (STANDING):  acetaminophen     Tablet .. 650 milliGRAM(s) Oral once    MEDICATIONS  (PRN):      Vitals:  T(C): 36.5 (08-27-22 @ 13:01), Max: 36.5 (08-27-22 @ 13:01)  HR: 61 (08-27-22 @ 13:01) (61 - 61)  BP: 164/67 (08-27-22 @ 13:01) (164/67 - 164/67)  RR: 16 (08-27-22 @ 13:01) (16 - 16)  SpO2: 100% (08-27-22 @ 13:01) (100% - 100%)    Physical Examination:   General - NAD  Cardiovascular - Peripheral pulses palpable, no edema  Eyes -  Fundoscopy not performed due to safety precautions in the setting of the COVID-19 pandemic    Neurologic Exam:  Mental status - Awake, Alert, Oriented to person, place, and time. Speech fluent, repetition and naming intact. Follows simple and complex commands. Attention/concentration, recent and remote memory (including registration and recall), and fund of knowledge intact    Cranial nerves - PERRL, VFF, EOMI, face sensation decreased in V1 on L compared to right but otherwise intact b/l, facial strength intact without asymmetry b/l, hearing intact b/l, palate with symmetric elevation, trapezius 5/5 strength b/l, tongue midline on protrusion with full lateral movement    Motor - Normal bulk and tone throughout. No pronator drift.  Strength testing            Deltoid      Biceps      Triceps     Wrist Extension    Wrist Flexion     Interossei         R            5                 5               5                     5                              5                        5                 5  L             5                 5               5                     5                              5                        5                 5              Hip Flexion    Hip Extension    Knee Flexion    Knee Extension    Dorsiflexion    Plantar Flexion  R              5                           5                       5                           5                            5                          5  L              5                           5                        5                           5                            5                          5    Sensation - Light touch decreased in LUE and LLE compared to right side    DTR's -  Brisk reflexes throughout and mute toe response b/'    Coordination - Finger to Nose intact b/l. No tremors appreciated    Gait and station - Normal casual gait. Romberg (-)    Labs:          CAPILLARY BLOOD GLUCOSE      POCT Blood Glucose.: 85 mg/dL (27 Aug 2022 13:10)        Radiology:

## 2022-08-27 NOTE — ED PROVIDER NOTE - NSFOLLOWUPINSTRUCTIONS_ED_ALL_ED_FT
You were seen in the Emergency Department for dizziness.     1) Continue all previously prescribed medications as directed.    2) Follow up with your primary care physician - take copies of your results.    3) Return to the Emergency Department for worsening or persistent symptoms, and/or ANY NEW OR CONCERNING SYMPTOMS. You were seen in the Emergency Department for numbness. Your blood work, ekg, xrays, and CT scan did not show any emergent medical problems that require hospitalization or inpatient treatment. Follow up with your primary care doctor and neurologist as discussed.    1) Continue all previously prescribed medications as directed.    2) Follow up with your primary care physician - take copies of your results.    3) Return to the Emergency Department for worsening or persistent symptoms, and/or ANY NEW OR CONCERNING SYMPTOMS.

## 2022-08-27 NOTE — ED PROVIDER NOTE - PHYSICAL EXAMINATION
General: NAD  HEENT: NCAT, tenderness of the trapezius  Cardiac: RRR, 2+ radial pulses  Chest: CTA  Abdomen: soft, non-distended, no ttp, no rebound or guarding  Extremities: no peripheral edema, calf tenderness, or leg size discrepancies +L straight leg positive.   Skin: no rashes  Neuro: AAOx4, cns intact, 5+motor and sensory grossly intact, no dysmetria  Psych: mood and affect appropriate

## 2022-08-27 NOTE — ED PROVIDER NOTE - ATTENDING CONTRIBUTION TO CARE
62F L arm and leg numbness x 1 month, worse today.  today was driving, felt lightheaded and LLE heaviness.  s/p andrew 2020.  pmhx glaucoma.  Is also having HA started today.  Pt also has h/o TIA, no longer on plavix.  Also c/o L groin pain.  Pt has seen neuro about 3 weeks ago and had MRI of head but is unsure of results, not visible here.  Stroke code cancelled due to 1 month time frame, h/o aneurysms.  Rx fluids given dizziness, check labs, EKG, CE, CTA H/N.  Neuro eval.    VS:  unremarkable except HTN    GEN - NAD;   malaise;   A+O x3   HEAD - NC/AT     ENT - PEERL, EOMI, mucous membranes    moist , no discharge      NECK: Neck supple, non-tender without lymphadenopathy, no masses, no JVD  PULM - CTA b/l,  symmetric breath sounds  COR -  normal heart sounds    ABD - , ND, NT, soft,  BACK - no CVA tenderness, nontender spine     EXTREMS - no edema, no deformity, warm and well perfused    SKIN - no rash    or bruising      NEUROLOGIC - alert, face symmetric, speech fluent, sensation nl R side; decreased L face arm and leg, motor no focal deficit.

## 2022-08-28 LAB
CULTURE RESULTS: SIGNIFICANT CHANGE UP
SPECIMEN SOURCE: SIGNIFICANT CHANGE UP

## 2022-09-02 ENCOUNTER — APPOINTMENT (OUTPATIENT)
Dept: OBGYN | Facility: CLINIC | Age: 62
End: 2022-09-02

## 2022-09-02 PROCEDURE — 81002 URINALYSIS NONAUTO W/O SCOPE: CPT

## 2022-09-02 PROCEDURE — 99396 PREV VISIT EST AGE 40-64: CPT | Mod: 25

## 2022-09-02 PROCEDURE — 82270 OCCULT BLOOD FECES: CPT

## 2022-10-14 ENCOUNTER — OUTPATIENT (OUTPATIENT)
Dept: OUTPATIENT SERVICES | Facility: HOSPITAL | Age: 62
LOS: 1 days | End: 2022-10-14

## 2022-10-14 VITALS
WEIGHT: 128.09 LBS | OXYGEN SATURATION: 99 % | DIASTOLIC BLOOD PRESSURE: 83 MMHG | HEART RATE: 63 BPM | SYSTOLIC BLOOD PRESSURE: 168 MMHG | TEMPERATURE: 98 F | RESPIRATION RATE: 15 BRPM | HEIGHT: 61 IN

## 2022-10-14 DIAGNOSIS — Z90.710 ACQUIRED ABSENCE OF BOTH CERVIX AND UTERUS: Chronic | ICD-10-CM

## 2022-10-14 DIAGNOSIS — D31.61 BENIGN NEOPLASM OF UNSPECIFIED SITE OF RIGHT ORBIT: ICD-10-CM

## 2022-10-14 DIAGNOSIS — Z86.79 PERSONAL HISTORY OF OTHER DISEASES OF THE CIRCULATORY SYSTEM: ICD-10-CM

## 2022-10-14 DIAGNOSIS — I10 ESSENTIAL (PRIMARY) HYPERTENSION: ICD-10-CM

## 2022-10-14 DIAGNOSIS — Z98.890 OTHER SPECIFIED POSTPROCEDURAL STATES: Chronic | ICD-10-CM

## 2022-10-14 DIAGNOSIS — Z86.018 PERSONAL HISTORY OF OTHER BENIGN NEOPLASM: ICD-10-CM

## 2022-10-14 RX ORDER — ROSUVASTATIN CALCIUM 5 MG/1
0 TABLET ORAL
Qty: 0 | Refills: 0 | DISCHARGE

## 2022-10-14 RX ORDER — LATANOPROST 0.05 MG/ML
1 SOLUTION/ DROPS OPHTHALMIC; TOPICAL
Qty: 0 | Refills: 0 | DISCHARGE

## 2022-10-14 RX ORDER — LATANOPROST 0.05 MG/ML
0 SOLUTION/ DROPS OPHTHALMIC; TOPICAL
Qty: 0 | Refills: 0 | DISCHARGE

## 2022-10-14 RX ORDER — ROSUVASTATIN CALCIUM 5 MG/1
1 TABLET ORAL
Qty: 0 | Refills: 0 | DISCHARGE

## 2022-10-14 RX ORDER — PANTOPRAZOLE SODIUM 20 MG/1
0 TABLET, DELAYED RELEASE ORAL
Qty: 0 | Refills: 0 | DISCHARGE

## 2022-10-14 NOTE — H&P PST ADULT - NEGATIVE FEMALE-SPECIFIC SYMPTOMS
no irregular menses/no vaginal discharge/no dysmenorrhea/no amenorrhea/no menorrhagia/no spotting/no abnormal vaginal bleeding

## 2022-10-14 NOTE — H&P PST ADULT - NSICDXPASTMEDICALHX_GEN_ALL_CORE_FT
PAST MEDICAL HISTORY:  Fibroid uterus     GERD (gastroesophageal reflux disease)     H/O: glaucoma     History of TIAs 2019    Hyperlipemia     Hypertension     OA (osteoarthritis)      PAST MEDICAL HISTORY:  Fibroid uterus     GERD (gastroesophageal reflux disease)     H/O aneurysm A2 anuerysm- followed up with neuro-09/2022, no new changes as per pt    H/O: glaucoma     History of TIAs 2019    Hyperlipemia     Hypertension     OA (osteoarthritis)

## 2022-10-14 NOTE — H&P PST ADULT - NEGATIVE ENMT SYMPTOMS
no hearing difficulty/no ear pain/no tinnitus/no sinus symptoms/no nasal congestion/no nasal obstruction/no post-nasal discharge/no nose bleeds/no recurrent cold sores/no abnormal taste sensation/no gum bleeding/no dry mouth/no throat pain/no dysphagia

## 2022-10-14 NOTE — H&P PST ADULT - GEN GEN HX ROS MEA POS PC
[facility] [address]   Phone: [phone]    Occupational Therapy    [] Initial Evaluation            [x] Daily Treatment Note         [] Discharge Summary      Patient: Lugene Crigler   : 1947   MRN: [id number]   Date of Service:  2022    Admitting Diagnosis:  RADHA (acute kidney injury) [facility])  Current Admission Summary: Lugene Crigler is a 76 y.o. male who presents because of feeling dizzy. His wife provides the majority of the history. He apparently took shower this morning and afterwards, felt very lightheaded and fell back into a shower chair. His wife is there to help him to prevent injury and prevented his collapsed to the floor. He was extremely sweaty and nauseated. He had dry heaves. He has been feeling nauseated since he started spironolactone. His cardiologist has been trying to help him with leg edema. He has been on diuretic for long time and additional medicine was started earlier this week. He denies having any chest pain. He denies any injury from the falls. He did take a second fall this morning in getting out of the car. He fell to his knees. He denies any injury. Past Medical History:  has a past medical history of Allergic rhinitis, Aortic valve disorders, COPD (chronic obstructive pulmonary disease) ([address] Hydrocephalus ([address] Hyperlipidemia, Hypertension, Hypertrophy of prostate without urinary obstruction and other lower urinary tract symptoms (LUTS), Irritable bowel syndrome, Pancreatitis, Peptic ulcer, unspecified site, unspecified as acute or chronic, without mention of hemorrhage, perforation, or obstruction, and Peripheral vascular disease ([address] Past Surgical History:  has a past surgical history that includes femoral bypass; Cholecystectomy; hernia repair (Left); Tonsillectomy; angioplasty; Abdominal aortic aneurysm repair (N/A, ); Foot surgery (Right, 12/2/15); other surgical history (10/23/2017);  Anomalous venous return repair (03/20/2018); Anomalous venous return repair; Upper gastrointestinal endoscopy (09/19/2018); Aortic valve replacement (2018); pr esophagogastroduodenoscopy transoral diagnostic (N/A, 12/4/2018); Colonoscopy (N/A, 5/4/2021); and Upper gastrointestinal endoscopy (N/A, 4/7/2022). Discharge Recommendations: Akshat Tam scored a 11/24 on the AM-PAC ADL Inpatient form. Current research shows that an AM-PAC score of 17 or less is typically not associated with a discharge to the patient's home setting. Based on the patient's AM-PAC score and their current ADL deficits, it is recommended that the patient have 5-7 sessions per week of Occupational Therapy at d/c to increase the patient's independence. At this time, this patient demonstrates complex nursing, medical, and rehabilitative needs, and would benefit from intensive rehabilitation services upon discharge from the Inpatient setting. This patient demonstrates the ability to participate in and benefit from an intensive therapy program with a coordinated interdisciplinary team approach to foster frequent, structured, and documented communication among disciplines, who will work together to establish, prioritize, and achieve treatment goals. Please see assessment section for further patient specific details. If patient discharges prior to next session this note will serve as a discharge summary. Please see below for the latest assessment towards goals.          DME Required For Discharge: no DME required at discharge    Precautions/Restrictions: high fall risk  Weight Bearing Restrictions: no restrictions  [] Right Upper Extremity  [] Left Upper Extremity [] Right Lower Extremity  [] Left Lower Extremity     Required Braces/Orthotics: no braces required   [] Right  [] Left  Positional Restrictions:no positional restrictions    Pre-Admission Information   Lives With: spouse                  Type of Home: house  Home Layout: two level  Home Access:  15 step to enter with handrail. Handrails are located on R side. Bathroom Layout: walk in shower  Bathroom Equipment: grab bars in shower, grab bars around toilet, shower chair  Toilet Height: standard height  Home Equipment: rolling walker  Transfer Assistance: modified independent with use of RW  Ambulation Assistance:modified independent with use of RW  ADL Assistance: requires assistance with bathing, requires assistance with dressing  IADL Assistance: requires assistance with meal prep, requires assistance with laundry, requires assistance with vacuuming, requires assistance with cleaning, requires assistance with shopping  Active :        [x] Yes                 [] No  Hand Dominance: [] Left                 [x] Right  Current Employment: retired. Occupation: maintenance [employer]  Hobbies: \"No, I'm lazy. \"  Recent Falls: Fall 3-4 days ago, multiple falls in the past.      Subjective: Pt supine in bed upon entry, pleasant and agreeable to therapy session. General: Pt roll right Mod A and supine to sit Mod A. Pt sit EOB CGA-SBA. Pt Dependent for brief change (to thread and pull up in stance) and hygiene care. Pt propped right elbow SBA ~2 min to help correct to midline with pt with left lateral lean. Pt sit to stand Mod A x 2, in stance Mod A x 2 (brief mgmt) and stand to sit Mod A x 2. Pt stand pivot Max A x 2 to recliner. Pt seated in recliner with oral care and wash face (setup/Supervision). Pt sit to stand Mod A x 2, in stance Mod A x 2 ~1min and stand to sit Mod A x 2. Pt with seated rest break. Pt sit to stand Max A x 2, in stance Max A x 2 ~30sec and stand to sit Max A x 2. Call light in reach and chair alarm on.   Pain: 0/10  Pain Interventions: not applicable        Activities of Daily Living  Basic Activities of Daily Living  Grooming: setup assistance supervision  Lower Extremity Dressing: dependent  Instrumental Activities of Daily Living  No IADL completed on this date.    Functional Mobility  Bed Mobility  Supine to Sit: moderate assistance  Rolling Right: moderate assistance  Scooting: dependent assistance  Comments: Max cues for initiation and sequencing all activity in conjunction with significantly decreased processing. Transfers  Sit to stand transfer:2 person assistance with Mod Ax2 to Max A x 2   Stand to sit transfer: 2 person assistance with Mod Ax2 to Max A x 2   Stand pivot transfer: 2 person assistance with Max A x 2   Functional Mobility:  Sitting Balance: stand by assistance, contact guard assistance. Standing Balance: 2 person assistance with Mod A x 2 progressing to Max A x 2 . No functional mobility completed on this date.     Other Therapeutic Interventions    Functional Outcomes  AM-PAC Inpatient Daily Activity Raw Score: 11    Cognition  Overall Cognitive Status: Impaired  Arousal/Alterness: delayed responses to stimuli  Following Commands: follows one step commands with repetition, follows one step commands with increased time, inconsistently follows commands  Attention Span: attends with cues to redirect  Memory: decreased recall of biographical information, decreased recall of recent events  Safety Judgement: decreased awareness of need for assistance, decreased awareness of need for safety  Problem Solving: assistance required to generate solutions, assistance required to implement solutions, decreased awareness of errors, assistance required to identify errors made, assistance required to correct errors made  Insights: decreased awareness of deficits  Initiation: requires cues for all  Sequencing: requires cues for all  Orientation:    oriented to person, oriented to place, disoriented to time , and disoriented to situation  Command Following:   impaired     Education  Barriers To Learning: cognition  Patient Education: patient educated on goals, OT role and benefits, plan of care, family education, transfer training, discharge recommendations  Learning Assessment:  patient will require reinforcement due to cognitive deficits, patient is not an independent learner    Assessment  Activity Tolerance: Pt tolerated treatment well. Impairments Requiring Therapeutic Intervention: decreased functional mobility, decreased ADL status, decreased ROM, decreased strength, decreased safety awareness, decreased cognition, decreased endurance, decreased balance, decreased fine motor control, decreased coordination, decreased posture  Prognosis: fair  Clinical Assessment: Pt presenting below his functional baseline with the above deficits secondary to RADHA with metabolic encephalopathy. Pt requiring Mod A x 2 progressing to Max A x 2 for sit to stand/stand to sit and Max A x 2 for stand pivot transfer. Pt Dependent to don brief. Highly recommend continued inpt therapy at d/c to maximize functional independence, safety and reduce caregiver burden. Continue with POC. Safety Interventions: patient left in chair, chair alarm in place, call light within reach, patient at risk for falls, nurse notified, and family/caregiver present    Plan  Frequency: 5-7 x/week  Current Treatment Recommendations: strengthening, ROM, balance training, functional mobility training, transfer training, gait training, endurance training, neuromuscular re-education, patient/caregiver education, ADL/self-care training, safety education, and equipment evaluation/education    Goals  Patient Goals: Return to home   Short Term Goals:  Time Frame: Discharge  Patient will complete upper body ADL at minimal assistance - goal not addressed 8/2  Patient will complete toileting at 2 person assistance with Mod A  - goal not addressed 8/2  Patient to maintain standing at 2 person assistance with Min A  for 2 minutes. - goal not met 8/2  Patient to initiate functional tasks with min A within 3/5 attempts.  - continue goal    Therapy Session Time     Individual Group Co-treatment   Time In 7981   Time Out    1035   Minutes    53         Timed Code Treatment Minutes: 53 Minutes  Total Treatment Minutes:  53 minutes       Electronically Signed By: JUAN A Layne/L [id number]   I have reviewed and agree with the above treatment note.  Sanjay MOELLER/L [id number] weight loss

## 2022-10-14 NOTE — H&P PST ADULT - NSANTHOSAYNRD_GEN_A_CORE
Denies sleep studies done in the past/No. HOPE screening performed.  STOP BANG Legend: 0-2 = LOW Risk; 3-4 = INTERMEDIATE Risk; 5-8 = HIGH Risk

## 2022-10-14 NOTE — H&P PST ADULT - PROBLEM SELECTOR PLAN 3
Patient with PMH of TIA in 2019, Brain Aneurysm- L A2 aneurysm- followed up with neurosurgeon- 2 weeks ago, s/p MRA Head- as per pt no new changes with aneurysm- will obtain last neurology note. Patient with PMH of TIA in 2019, Brain Aneurysm- L A2 aneurysm- followed up with neurosurgeon- 2 weeks ago, s/p MRA Head- as per pt no new changes with aneurysm- will obtain last neurology note. Case discussed with Dr Moreau.

## 2022-10-14 NOTE — H&P PST ADULT - NEUROLOGICAL COMMENTS
Yes pt reports of lumbar/ cervical spine arthritis- radiculopathy- left Upper and lower extremities for 7 years pt reports of lumbar/ cervical spine arthritis- radiculopathy- left Upper and lower extremities for 7 years, brain aneurysm which was stable from recent MRA head

## 2022-10-14 NOTE — H&P PST ADULT - ASSESSMENT
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

## 2022-10-14 NOTE — H&P PST ADULT - OPHTHALMOLOGIC COMMENTS
pt reports of right upper eye lump - with mild secretion in the morning - for  last couple of months

## 2022-10-14 NOTE — H&P PST ADULT - PROBLEM SELECTOR PLAN 1
Patient is tentatively scheduled for  Right Orbitotomy with Dr Mejía on 10/24/2022.    Pre-op instructions provided. Pt given verbal and written instructions with teach back on patient own pantoprazole Pt verbalized understanding with return demonstration.    Routine Covid PCR test ordered .Instructions regarding covid PCR test and locations for covid testing site provided. Pt verbalized understanding    HOPE precautions

## 2022-10-14 NOTE — H&P PST ADULT - ATTENDING COMMENTS
For right lacrimal gland biopsy and plication.  Risks, benefits, options reviewed.  All questions answered.

## 2022-10-20 LAB — SARS-COV-2 RNA SPEC QL NAA+PROBE: SIGNIFICANT CHANGE UP

## 2022-10-21 VITALS
WEIGHT: 128.09 LBS | HEIGHT: 61 IN | TEMPERATURE: 98 F | HEART RATE: 74 BPM | RESPIRATION RATE: 15 BRPM | SYSTOLIC BLOOD PRESSURE: 165 MMHG | OXYGEN SATURATION: 98 % | DIASTOLIC BLOOD PRESSURE: 89 MMHG

## 2022-10-21 NOTE — ASU PREOPERATIVE ASSESSMENT, ADULT ([facility]ARK ONLY) - FALL HARM RISK - UNIVERSAL INTERVENTIONS
Bed in lowest position, wheels locked, appropriate side rails in place/Call bell, personal items and telephone in reach/Instruct patient to call for assistance before getting out of bed or chair/Non-slip footwear when patient is out of bed/[place] to call system/Physically safe environment - no spills, clutter or unnecessary equipment/Purposeful Proactive Rounding/Room/bathroom lighting operational, light cord in reach

## 2022-10-23 ENCOUNTER — TRANSCRIPTION ENCOUNTER (OUTPATIENT)
Age: 62
End: 2022-10-23

## 2022-10-24 ENCOUNTER — TRANSCRIPTION ENCOUNTER (OUTPATIENT)
Age: 62
End: 2022-10-24

## 2022-10-24 ENCOUNTER — OUTPATIENT (OUTPATIENT)
Dept: OUTPATIENT SERVICES | Facility: HOSPITAL | Age: 62
LOS: 1 days | Discharge: HOME | End: 2022-10-24

## 2022-10-24 ENCOUNTER — RESULT REVIEW (OUTPATIENT)
Age: 62
End: 2022-10-24

## 2022-10-24 VITALS
RESPIRATION RATE: 15 BRPM | TEMPERATURE: 97 F | OXYGEN SATURATION: 100 % | SYSTOLIC BLOOD PRESSURE: 136 MMHG | HEART RATE: 58 BPM | DIASTOLIC BLOOD PRESSURE: 82 MMHG

## 2022-10-24 DIAGNOSIS — Z90.710 ACQUIRED ABSENCE OF BOTH CERVIX AND UTERUS: Chronic | ICD-10-CM

## 2022-10-24 DIAGNOSIS — D31.61 BENIGN NEOPLASM OF UNSPECIFIED SITE OF RIGHT ORBIT: ICD-10-CM

## 2022-10-24 DIAGNOSIS — Z98.890 OTHER SPECIFIED POSTPROCEDURAL STATES: Chronic | ICD-10-CM

## 2022-10-24 PROCEDURE — 88304 TISSUE EXAM BY PATHOLOGIST: CPT | Mod: 26

## 2022-10-24 RX ORDER — PANTOPRAZOLE SODIUM 20 MG/1
1 TABLET, DELAYED RELEASE ORAL
Qty: 0 | Refills: 0 | DISCHARGE

## 2022-10-24 RX ORDER — LATANOPROST 0.05 MG/ML
0 SOLUTION/ DROPS OPHTHALMIC; TOPICAL
Qty: 0 | Refills: 0 | DISCHARGE

## 2022-10-24 RX ORDER — ROSUVASTATIN CALCIUM 5 MG/1
1 TABLET ORAL
Qty: 0 | Refills: 0 | DISCHARGE

## 2022-10-24 RX ORDER — TRAVOPROST 0.04 MG/ML
0 SOLUTION/ DROPS OPHTHALMIC
Qty: 0 | Refills: 0 | DISCHARGE

## 2022-10-24 NOTE — ASU DISCHARGE PLAN (ADULT/PEDIATRIC) - CARE PROVIDER_API CALL
Jaimie Mejía)  Ophthalmology  [address]  [place] [address]  Phone: [phone]  Fax: [phone]  Follow Up Time:

## 2022-10-24 NOTE — ASU DISCHARGE PLAN (ADULT/PEDIATRIC) - NS MD DC FALL RISK RISK
For information on Fall & Injury Prevention, visit: [email or web address] OR  [email or web address] OR  https://www.cdc.gov/steadi/patient.html

## 2022-10-27 LAB — SURGICAL PATHOLOGY STUDY: SIGNIFICANT CHANGE UP

## 2023-03-06 NOTE — ED ADULT TRIAGE NOTE - TEMPERATURE IN CELSIUS (DEGREES C)
36.9 Dupixent Counseling: I discussed with the patient the risks of dupilumab including but not limited to eye infection and irritation, cold sores, injection site reactions, worsening of asthma, allergic reactions and increased risk of parasitic infection.  Live vaccines should be avoided while taking dupilumab. Dupilumab will also interact with certain medications such as warfarin and cyclosporine. The patient understands that monitoring is required and they must alert us or the primary physician if symptoms of infection or other concerning signs are noted.

## 2023-08-07 NOTE — ASU DISCHARGE PLAN (ADULT/PEDIATRIC) - DIET/FLUID RESTRICTION
Other (specify diet and fluid) O-Z Flap Text: The defect edges were debeveled with a #15 scalpel blade. Given the location of the defect, shape of the defect and the proximity to free margins an O-Z flap was deemed most appropriate. Using a sterile surgical marker, an appropriate transposition flap was drawn incorporating the defect and placing the expected incisions within the relaxed skin tension lines where possible. The area thus outlined was incised deep to adipose tissue with a #15 scalpel blade. The skin margins were undermined to an appropriate distance in all directions utilizing iris scissors. Following this, the designed flap was carried over into the primary defect and sutured into place.

## 2023-10-03 PROBLEM — Z86.69 PERSONAL HISTORY OF OTHER DISEASES OF THE NERVOUS SYSTEM AND SENSE ORGANS: Chronic | Status: ACTIVE | Noted: 2022-10-14

## 2023-10-03 PROBLEM — Z86.73 PERSONAL HISTORY OF TRANSIENT ISCHEMIC ATTACK (TIA), AND CEREBRAL INFARCTION WITHOUT RESIDUAL DEFICITS: Chronic | Status: ACTIVE | Noted: 2020-10-26

## 2023-10-03 PROBLEM — Z86.79 PERSONAL HISTORY OF OTHER DISEASES OF THE CIRCULATORY SYSTEM: Chronic | Status: ACTIVE | Noted: 2022-10-14

## 2023-10-08 NOTE — ED PROVIDER NOTE - MDM ORDERS SUBMITTED SELECTION
signed)  Emergency Attending Physician / Physician Assistant / Nurse Practitioner             Chidi Franklin PA-C  10/08/23 4027 Not Applicable

## 2023-10-17 ENCOUNTER — APPOINTMENT (OUTPATIENT)
Dept: OBGYN | Facility: CLINIC | Age: 63
End: 2023-10-17
Payer: COMMERCIAL

## 2023-10-17 PROCEDURE — 82270 OCCULT BLOOD FECES: CPT

## 2023-10-17 PROCEDURE — 99396 PREV VISIT EST AGE 40-64: CPT | Mod: 25

## 2023-10-17 PROCEDURE — 81002 URINALYSIS NONAUTO W/O SCOPE: CPT

## 2024-04-15 NOTE — ED ADULT NURSE NOTE - NS ED NURSE RECORD ANOTHER VITAL SIGN
Procedure  Critical Care    Performed by: Gerry Hernandez DO  Authorized by: Gerry Hernandez DO    Critical care provider statement:     Critical care time (minutes):  35    Critical care time was exclusive of:  Separately billable procedures and treating other patients and teaching time    Critical care was necessary to treat or prevent imminent or life-threatening deterioration of the following conditions: overdose.    Critical care was time spent personally by me on the following activities:  Blood draw for specimens, development of treatment plan with patient or surrogate, discussions with consultants, evaluation of patient's response to treatment, examination of patient, ordering and performing treatments and interventions, ordering and review of laboratory studies, pulse oximetry, re-evaluation of patient's condition and review of old charts               Gerry Hernandez DO  04/15/24 8148    
Yes

## 2024-04-16 NOTE — DISCHARGE NOTE ADULT - NURSING SECTION COMPLETE
Pt has discharge orders. Pt educated on discharge instructions and new prescriptions.  Pt verbalizes understanding.   PIV removed. Pt ambulatory to lobby with steady gait. Pt going home with parent.     Patient/Caregiver provided printed discharge information.

## 2024-04-26 NOTE — H&P PST ADULT - PATIENT ON (OXYGEN DELIVERY METHOD)
Patient was screened for depression, fall risk, and alcohol dependence  Medications were reconciled  Names of medical providers were added record  Colonoscopy 3 yrs ago  End of life issues discussed  POA form given to pt and daughter     room air

## 2024-07-12 ENCOUNTER — HOSPITAL ENCOUNTER (EMERGENCY)
Facility: HOSPITAL | Age: 64
Discharge: HOME | End: 2024-07-12
Attending: EMERGENCY MEDICINE
Payer: COMMERCIAL

## 2024-07-12 ENCOUNTER — APPOINTMENT (OUTPATIENT)
Facility: HOSPITAL | Age: 64
End: 2024-07-12
Payer: COMMERCIAL

## 2024-07-12 VITALS
RESPIRATION RATE: 14 BRPM | WEIGHT: 135.58 LBS | SYSTOLIC BLOOD PRESSURE: 157 MMHG | HEART RATE: 66 BPM | BODY MASS INDEX: 27.33 KG/M2 | DIASTOLIC BLOOD PRESSURE: 82 MMHG | TEMPERATURE: 99.1 F | HEIGHT: 59 IN | OXYGEN SATURATION: 99 %

## 2024-07-12 DIAGNOSIS — N20.1 LEFT URETERAL STONE: Primary | ICD-10-CM

## 2024-07-12 LAB
ALBUMIN SERPL-MCNC: 4.1 G/DL (ref 3.5–5)
ALBUMIN/GLOB SERPL: 1 (ref 1.1–2.2)
ALP SERPL-CCNC: 84 U/L (ref 45–117)
ALT SERPL-CCNC: 20 U/L (ref 12–78)
ANION GAP SERPL CALC-SCNC: 8 MMOL/L (ref 5–15)
APPEARANCE UR: CLEAR
AST SERPL-CCNC: 18 U/L (ref 15–37)
BACTERIA URNS QL MICRO: NEGATIVE /HPF
BASOPHILS # BLD: 0.1 K/UL (ref 0–0.1)
BASOPHILS NFR BLD: 1 % (ref 0–1)
BILIRUB SERPL-MCNC: 0.3 MG/DL (ref 0.2–1)
BILIRUB UR QL: NEGATIVE
BUN SERPL-MCNC: 12 MG/DL (ref 6–20)
BUN/CREAT SERPL: 15 (ref 12–20)
CALCIUM SERPL-MCNC: 9.4 MG/DL (ref 8.5–10.1)
CHLORIDE SERPL-SCNC: 101 MMOL/L (ref 97–108)
CO2 SERPL-SCNC: 30 MMOL/L (ref 21–32)
COLOR UR: ABNORMAL
CREAT SERPL-MCNC: 0.78 MG/DL (ref 0.55–1.02)
DIFFERENTIAL METHOD BLD: NORMAL
EOSINOPHIL # BLD: 0.1 K/UL (ref 0–0.4)
EOSINOPHIL NFR BLD: 1 % (ref 0–7)
EPITH CASTS URNS QL MICRO: ABNORMAL /LPF
ERYTHROCYTE [DISTWIDTH] IN BLOOD BY AUTOMATED COUNT: 12.6 % (ref 11.5–14.5)
GLOBULIN SER CALC-MCNC: 4.1 G/DL (ref 2–4)
GLUCOSE SERPL-MCNC: 104 MG/DL (ref 65–100)
GLUCOSE UR STRIP.AUTO-MCNC: NEGATIVE MG/DL
HCT VFR BLD AUTO: 40.8 % (ref 35–47)
HGB BLD-MCNC: 13.9 G/DL (ref 11.5–16)
HGB UR QL STRIP: ABNORMAL
IMM GRANULOCYTES # BLD AUTO: 0 K/UL (ref 0–0.04)
IMM GRANULOCYTES NFR BLD AUTO: 0 % (ref 0–0.5)
KETONES UR QL STRIP.AUTO: NEGATIVE MG/DL
LEUKOCYTE ESTERASE UR QL STRIP.AUTO: NEGATIVE
LIPASE SERPL-CCNC: 29 U/L (ref 13–75)
LYMPHOCYTES # BLD: 2.2 K/UL (ref 0.8–3.5)
LYMPHOCYTES NFR BLD: 30 % (ref 12–49)
MCH RBC QN AUTO: 29.2 PG (ref 26–34)
MCHC RBC AUTO-ENTMCNC: 34.1 G/DL (ref 30–36.5)
MCV RBC AUTO: 85.7 FL (ref 80–99)
MONOCYTES # BLD: 0.6 K/UL (ref 0–1)
MONOCYTES NFR BLD: 9 % (ref 5–13)
NEUTS SEG # BLD: 4.2 K/UL (ref 1.8–8)
NEUTS SEG NFR BLD: 59 % (ref 32–75)
NITRITE UR QL STRIP.AUTO: NEGATIVE
NRBC # BLD: 0 K/UL (ref 0–0.01)
NRBC BLD-RTO: 0 PER 100 WBC
PH UR STRIP: 6 (ref 5–8)
PLATELET # BLD AUTO: 221 K/UL (ref 150–400)
PMV BLD AUTO: 11.3 FL (ref 8.9–12.9)
POTASSIUM SERPL-SCNC: 3.6 MMOL/L (ref 3.5–5.1)
PROT SERPL-MCNC: 8.2 G/DL (ref 6.4–8.2)
PROT UR STRIP-MCNC: NEGATIVE MG/DL
RBC # BLD AUTO: 4.76 M/UL (ref 3.8–5.2)
RBC #/AREA URNS HPF: ABNORMAL /HPF (ref 0–5)
SODIUM SERPL-SCNC: 139 MMOL/L (ref 136–145)
SP GR UR REFRACTOMETRY: 1.01 (ref 1–1.03)
SPECIMEN HOLD: NORMAL
UROBILINOGEN UR QL STRIP.AUTO: 0.2 EU/DL (ref 0.2–1)
WBC # BLD AUTO: 7.1 K/UL (ref 3.6–11)
WBC URNS QL MICRO: ABNORMAL /HPF (ref 0–4)

## 2024-07-12 PROCEDURE — 80053 COMPREHEN METABOLIC PANEL: CPT

## 2024-07-12 PROCEDURE — 83690 ASSAY OF LIPASE: CPT

## 2024-07-12 PROCEDURE — 81001 URINALYSIS AUTO W/SCOPE: CPT

## 2024-07-12 PROCEDURE — 2580000003 HC RX 258: Performed by: EMERGENCY MEDICINE

## 2024-07-12 PROCEDURE — 6360000002 HC RX W HCPCS: Performed by: EMERGENCY MEDICINE

## 2024-07-12 PROCEDURE — 74176 CT ABD & PELVIS W/O CONTRAST: CPT

## 2024-07-12 PROCEDURE — 36415 COLL VENOUS BLD VENIPUNCTURE: CPT

## 2024-07-12 PROCEDURE — 85025 COMPLETE CBC W/AUTO DIFF WBC: CPT

## 2024-07-12 PROCEDURE — 99284 EMERGENCY DEPT VISIT MOD MDM: CPT

## 2024-07-12 PROCEDURE — 96374 THER/PROPH/DIAG INJ IV PUSH: CPT

## 2024-07-12 RX ORDER — 0.9 % SODIUM CHLORIDE 0.9 %
1000 INTRAVENOUS SOLUTION INTRAVENOUS ONCE
Status: COMPLETED | OUTPATIENT
Start: 2024-07-12 | End: 2024-07-12

## 2024-07-12 RX ORDER — PANTOPRAZOLE SODIUM 40 MG/1
40 TABLET, DELAYED RELEASE ORAL DAILY
COMMUNITY
Start: 2023-02-06

## 2024-07-12 RX ORDER — KETOROLAC TROMETHAMINE 30 MG/ML
15 INJECTION, SOLUTION INTRAMUSCULAR; INTRAVENOUS ONCE
Status: COMPLETED | OUTPATIENT
Start: 2024-07-12 | End: 2024-07-12

## 2024-07-12 RX ORDER — HYDROCHLOROTHIAZIDE 25 MG/1
25 TABLET ORAL DAILY
COMMUNITY
Start: 2024-02-27

## 2024-07-12 RX ORDER — IBUPROFEN 800 MG/1
800 TABLET ORAL 3 TIMES DAILY PRN
Qty: 30 TABLET | Refills: 0 | Status: SHIPPED | OUTPATIENT
Start: 2024-07-12

## 2024-07-12 RX ORDER — BRINZOLAMIDE 10 MG/ML
1 SUSPENSION/ DROPS OPHTHALMIC 3 TIMES DAILY
COMMUNITY
Start: 2023-01-13

## 2024-07-12 RX ORDER — ROSUVASTATIN CALCIUM 10 MG/1
10 TABLET, COATED ORAL DAILY
COMMUNITY
Start: 2024-02-20

## 2024-07-12 RX ORDER — ONDANSETRON 4 MG/1
4 TABLET, ORALLY DISINTEGRATING ORAL 3 TIMES DAILY PRN
Qty: 21 TABLET | Refills: 0 | Status: SHIPPED | OUTPATIENT
Start: 2024-07-12

## 2024-07-12 RX ORDER — LATANOPROST 50 UG/ML
1 SOLUTION/ DROPS OPHTHALMIC NIGHTLY
COMMUNITY
Start: 2021-12-17

## 2024-07-12 RX ADMIN — SODIUM CHLORIDE 1000 ML: 9 INJECTION, SOLUTION INTRAVENOUS at 20:56

## 2024-07-12 RX ADMIN — KETOROLAC TROMETHAMINE 15 MG: 30 INJECTION, SOLUTION INTRAMUSCULAR; INTRAVENOUS at 20:56

## 2024-07-12 ASSESSMENT — ENCOUNTER SYMPTOMS
NAUSEA: 1
VOMITING: 0
ABDOMINAL PAIN: 1
DIARRHEA: 0

## 2024-07-12 ASSESSMENT — PAIN DESCRIPTION - ORIENTATION: ORIENTATION: RIGHT

## 2024-07-12 ASSESSMENT — PAIN SCALES - GENERAL: PAINLEVEL_OUTOF10: 9

## 2024-07-12 ASSESSMENT — PAIN DESCRIPTION - PAIN TYPE: TYPE: ACUTE PAIN

## 2024-07-12 ASSESSMENT — PAIN - FUNCTIONAL ASSESSMENT: PAIN_FUNCTIONAL_ASSESSMENT: 0-10

## 2024-07-12 ASSESSMENT — PAIN DESCRIPTION - DESCRIPTORS: DESCRIPTORS: ACHING

## 2024-07-12 ASSESSMENT — PAIN DESCRIPTION - LOCATION: LOCATION: ABDOMEN;FLANK

## 2024-07-12 NOTE — ED ADULT NURSE NOTE - NSFALLRSKHARMRISK_ED_ALL_ED
Vancomycin Dosing by Pharmacy- FOLLOW UP    Miguel A Bender is a 45 y.o. year old male who Pharmacy has been consulted for vancomycin dosing for pneumonia. Based on the patient's indication and renal status this patient is being dosed based on a goal trough/random level of 15-20.     Renal function is currently declining.    Last vancomycin dose: 2 gm given  @0230    Most recent trough level: 21.6 mcg/mL (23 hour level)    Visit Vitals  /68 (BP Location: Left arm, Patient Position: Lying)   Pulse 80   Temp 37 °C (98.6 °F) (Core)   Resp 14        Lab Results   Component Value Date    CREATININE 4.32 (H) 2024    CREATININE 3.58 (H) 2024    CREATININE 3.00 (H) 2024    CREATININE 2.45 (H) 2024        Patient weight is as follows:   Vitals:    24 0300   Weight: 117 kg (257 lb 15 oz)       Cultures:  No results found for the encounter in last 14 days.       I/O last 3 completed shifts:  In: 3360.5 (28.7 mL/kg) [I.V.:1070.5 (9.2 mL/kg); NG/GT:390; IV Piggyback:1900]  Out: 765 (6.5 mL/kg) [Urine:765 (0.2 mL/kg/hr)]  Weight: 117 kg   I/O during current shift:  I/O this shift:  In: 272.9 [I.V.:222.9; IV Piggyback:50]  Out: -     Temp (24hrs), Av.6 °C (97.9 °F), Min:35.7 °C (96.3 °F), Max:37.7 °C (99.9 °F)      Assessment/Plan    Above goal random/trough level. Given Scr doubled from 2.09 --> 4.4 & trough above goal, will not re-dose & check another 24 hour level  with AM labs.     The next level will be obtained on  with AM labs. May be obtained sooner if clinically indicated.   Will continue to monitor renal function daily while on vancomycin and order serum creatinine at least every 48 hours if not already ordered.  Follow for continued vancomycin needs, clinical response, and signs/symptoms of toxicity.       Ceci Gayle, PharmD            no

## 2024-07-12 NOTE — ED TRIAGE NOTES
Patient reports pelvic pain radiating to the right lower back pain and leg. Reports felt like something was moving down. Reports increased urination. Hx of kidney stones.

## 2024-07-13 NOTE — ED PROVIDER NOTES
Heart sounds: Normal heart sounds.   Pulmonary:      Effort: Pulmonary effort is normal.      Breath sounds: Normal breath sounds.   Abdominal:      General: There is no distension.      Palpations: Abdomen is soft.      Tenderness: There is no abdominal tenderness. There is no right CVA tenderness or left CVA tenderness.   Skin:     General: Skin is warm and dry.   Neurological:      General: No focal deficit present.      Mental Status: She is alert and oriented to person, place, and time.   Psychiatric:         Mood and Affect: Mood normal.         Behavior: Behavior normal.         Thought Content: Thought content normal.         Judgment: Judgment normal.         DIAGNOSTIC RESULTS     EKG: All EKG's are interpreted by the Emergency Department Physician who either signs or Co-signs this chart in the absence of a cardiologist.        RADIOLOGY:   Non-plain film images such as CT, Ultrasound and MRI are read by the radiologist. Plain radiographic images are visualized and preliminarily interpreted by the emergency physician with the below findings:        Interpretation per the Radiologist below, if available at the time of this note:    CT ABDOMEN PELVIS WO CONTRAST Additional Contrast? None   Final Result   There is a 4 mm stone either laying in the urinary bladder or lodged in the left   UVJ. If in the left UVJ, there is no significant left hydronephrosis or   hydroureter.      Electronically signed by Abbey Worthy           LABS:  Labs Reviewed   COMPREHENSIVE METABOLIC PANEL - Abnormal; Notable for the following components:       Result Value    Glucose 104 (*)     Globulin 4.1 (*)     Albumin/Globulin Ratio 1.0 (*)     All other components within normal limits   URINALYSIS WITH MICROSCOPIC - Abnormal; Notable for the following components:    Blood, Urine SMALL (*)     All other components within normal limits   URINE CULTURE HOLD SAMPLE   CBC WITH AUTO DIFFERENTIAL   LIPASE       All other labs were

## 2024-08-21 NOTE — ED ADULT NURSE NOTE - NSFALLRSKOUTCOME_ED_ALL_ED
Speech-Language Pathology    SLP Adult Inpatient Speech-Language Pathology Clinical Swallow Evaluation    Patient Name: Isabell Swan  MRN: [id number]  Today's Date: 8/21/2024   Time Calculation  Start Time: 1125  Stop Time: 1145  Time Calculation (min): 20 min       Current Problem:   1. Acute right-sided weakness  Transthoracic Echo (TTE) Complete    Transthoracic Echo (TTE) Complete      2. Disorientation        3. Acute CVA (cerebrovascular accident) (Multi)  Transthoracic Echo (TTE) Complete    Transthoracic Echo (TTE) Complete      4. Essential hypertension  Transthoracic Echo (TTE) Complete    Transthoracic Echo (TTE) Complete      5. Stroke-like symptoms  Transthoracic Echo (TTE) Complete    Transthoracic Echo (TTE) Complete      6. Other cerebrovascular disease  Transthoracic Echo (TTE) Complete    Transthoracic Echo (TTE) Complete        Recommendations:      Solid Diet Recommendations : Pureed/extremely thick  (IDDSI Level 4)   Liquid Diet Recommendations: Thin (IDDSI Level 0)   Compensatory Swallowing Strategies: Upright 90 degrees as possible for all oral intake,   Remain upright for 20-30 minutes after meals,   Alternate solids and liquids,   Small bites/sips,   Eat/feed slowly,   One to one assist with meals,   Check for pocketing of food     Medication Administration Recommendations: Crushed, With Pureed      Assessment:  Consistencies Trialed: Consistencies Trialed: Thin (IDDSI Level 0) - Cup, Thin (IDDSI Level 0) - Straw, Pureed/extremely thick (IDDSI Level 4), Regular (IDDSI Level 7)   Oral Motor: Impaired Function  Oral/Motor Assessment  Dentition: Dentures (Lower Full), Dentures (Upper Full)  Oral Motor: Impaired Function  Facial Symmetry: Right droop  Labial ROM: Reduced right  Labial Strength: Reduced  Lingual Agility: Reduced  Lingual Deviation Right: Reduced  Lingual ROM: Reduced right    Assessment Results: Patient presents with mild-mod oral dysphagia and suspected functional pharyngeal  swallow upon completion of a clinical swallow evaluation. The patient was able to follow commands for the evaluation but presents with notable motor speech impairment and may benefit from a more comprehensive speech and language evaluation. Patient was observed with ice chips, cup/straw sips of water, puree and solids. No anterior spillage observed with any liquid trials. Cup presentation resulted in an immediate cough response. No overt s/s of aspiration observed with ice chips or straw presentations of thin. Puree trial completed with appropriate bolus manipulation and A-P transfer with full oral clearance. Solid trial characterized by a portio of the hali cracker being stuck between her lips with no attempt to retrieve the piece so it was removed. Mastication was slow with small pieces unchewed on R side. Pt was unable to manually manipulate residues to fully clear them independently. Ultimately they melted and were cleared with a liquid wash. Recommend progressing to an oral diet as outlined above.      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 There is minimal opacification of the left vertebral artery distally at level C4 vertebral body with minimal opacification of the V2, V3, and V4  segments distally.      Postsurgical change of left craniotomy and aneurysm clips in the region of the left anterior communicating artery and the left MCA bifurcation. No definite evidence of acute major proximal intracranial arterial occlusion.      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      Plan:  SLP Plan: Skilled SLP Skilled speech therapy for dysphagia treatment is warranted in order to provide training and instruction regarding the use of compensatory swallow  "strategies, oropharyngeal strengthening exercises, and pt/caregiver education in order to reduce risk of aspiration, dehydration and malnutrition.  SLP Frequency: 3x per week   Duration: 3 weeks   Next Treatment Priority: advance diet as appropriate; SLC eval recommended   Discussed POC: Patient, Nursing, Caregiver/family   Discussed Risks/Benefits: Yes   Patient/Caregiver Agreeable: Yes   Patient will be discontinued from speech therapy when no further skilled needs are identified in this setting.   SLP Discharge Recommendations: Continue skilled SLP services at the next level of care    Goals: Established 8/20/24  Patient will:  Patient/Caregiver will demonstrate knowledge of taught compensatory strategies and dietary consistency recommendations to optimize functional swallow function without overt clinical signs and symptoms of aspiration or dysphagia  Complete a modified barium swallow study (MBSS) as warranted upon further assessment/discussion with medical team for objective assessment of oropharyngeal swallow function, to assess for aspiration, and to guide further recommendations and treatment plan.     Subjective   Daughter, Ana Laura, was present in the room. Pt was able to closely approximate \"Angelika\", \"Ana Laura\" and \"thank you\" during the evaluation.     General Visit Information:  Pt. Admitted  8/20/24  Past medical history: Past Medical History Relevant to Rehab: 83 y.o. female with a significant past medical history of brain aneurysm with clips, left upper lobectomy, CABG x 5, COVID, RENE with CKD, HTN, HLD, presenting to [facility] with right-sided weakness aphasia and right facial droop    Living Environment: Home           Reason for Referral: Patient was seen for a clinical swallow evaluation (CSE) to assess swallow function, determine least restrictive diet, determine if a modified barium swallow study is warranted and develop appropriate POC for the current setting.      Current Diet : NPO   Prior " to Session Communication: Bedside nurse   Pain:  Pain Assessment  Pain Assessment: 0-10  0-10 (Numeric) Pain Score: 0 - No pain     Education:  Learner:  Patient, Family  Barriers to Learning: Communication limitations  Method: Verbal  Education - Topic: ST provided patient education regarding role of ST, purpose of assessment, clinical impressions, goals of treatment, and plan of care. Patient verbalized full comprehension, consistent with cognitive status. Education will be reinforced. ST further coordinated with RN regarding recommendations and precautions per this assessment, with RN verbalizing understanding.  Outcome:  Nodded understanding and agreement, Needs review/reinforcement     Universal Safety Interventions

## 2024-10-23 NOTE — DISCHARGE NOTE PROVIDER - CARE PROVIDERS DIRECT ADDRESSES
Addended by: OFELIA AMBRIZ on: 10/23/2024 03:27 PM     Modules accepted: Orders     [email or web address]

## 2024-11-25 NOTE — ASU PREOP CHECKLIST - ALLERGY BAND ON
LOV Baldwin- please advise    done

## 2024-12-11 NOTE — ED PROVIDER NOTE - MDM ORDERS SUBMITTED SELECTION
Writer phones patient and relays providers message. Patient denies any increase in urges or any unwanted side effects.  Requests new rx to pharmacy because patient will need early refill with new dosing.    Medications/Labs

## 2025-01-09 ENCOUNTER — APPOINTMENT (OUTPATIENT)
Dept: OBGYN | Facility: CLINIC | Age: 65
End: 2025-01-09
Payer: COMMERCIAL

## 2025-01-09 PROCEDURE — 76856 US EXAM PELVIC COMPLETE: CPT | Mod: 59

## 2025-01-09 PROCEDURE — 76830 TRANSVAGINAL US NON-OB: CPT

## 2025-01-09 PROCEDURE — 99459 PELVIC EXAMINATION: CPT

## 2025-01-09 PROCEDURE — 81002 URINALYSIS NONAUTO W/O SCOPE: CPT

## 2025-01-09 PROCEDURE — 82270 OCCULT BLOOD FECES: CPT

## 2025-01-09 PROCEDURE — 99396 PREV VISIT EST AGE 40-64: CPT

## 2025-02-28 NOTE — ED PROVIDER NOTE - NS ED SCRIBE STATEMENT
[facility] - Endoscopy 1st Fl  Discharge Note  Short Stay    Colonoscopy      OUTCOME: Patient tolerated treatment/procedure well without complication and is now ready for discharge.    DISPOSITION: Home or Self Care    FINAL DIAGNOSIS:  Personal history of colon polyps, unspecified    FOLLOWUP: With primary care provider    DISCHARGE INSTRUCTIONS:  No discharge procedures on file.        Attending

## 2025-07-13 ENCOUNTER — APPOINTMENT (OUTPATIENT)
Facility: HOSPITAL | Age: 65
End: 2025-07-13
Payer: MEDICARE

## 2025-07-13 ENCOUNTER — HOSPITAL ENCOUNTER (EMERGENCY)
Facility: HOSPITAL | Age: 65
Discharge: HOME | End: 2025-07-13
Attending: STUDENT IN AN ORGANIZED HEALTH CARE EDUCATION/TRAINING PROGRAM
Payer: MEDICARE

## 2025-07-13 VITALS
SYSTOLIC BLOOD PRESSURE: 123 MMHG | HEART RATE: 51 BPM | TEMPERATURE: 98 F | OXYGEN SATURATION: 99 % | RESPIRATION RATE: 15 BRPM | DIASTOLIC BLOOD PRESSURE: 61 MMHG

## 2025-07-13 DIAGNOSIS — R53.1 GENERALIZED WEAKNESS: Primary | ICD-10-CM

## 2025-07-13 DIAGNOSIS — R07.9 CHEST PAIN, UNSPECIFIED TYPE: ICD-10-CM

## 2025-07-13 LAB
ALBUMIN SERPL-MCNC: 3.8 G/DL (ref 3.5–5)
ALBUMIN/GLOB SERPL: 1 (ref 1.1–2.2)
ALP SERPL-CCNC: 71 U/L (ref 45–117)
ALT SERPL-CCNC: 22 U/L (ref 12–78)
ANION GAP SERPL CALC-SCNC: 5 MMOL/L (ref 2–12)
AST SERPL-CCNC: 14 U/L (ref 15–37)
BASOPHILS # BLD: 0.04 K/UL (ref 0–0.1)
BASOPHILS NFR BLD: 0.7 % (ref 0–1)
BILIRUB SERPL-MCNC: 0.5 MG/DL (ref 0.2–1)
BUN SERPL-MCNC: 11 MG/DL (ref 6–20)
BUN/CREAT SERPL: 15 (ref 12–20)
CALCIUM SERPL-MCNC: 9.4 MG/DL (ref 8.5–10.1)
CHLORIDE SERPL-SCNC: 103 MMOL/L (ref 97–108)
CO2 SERPL-SCNC: 29 MMOL/L (ref 21–32)
CREAT SERPL-MCNC: 0.71 MG/DL (ref 0.55–1.02)
DIFFERENTIAL METHOD BLD: NORMAL
EKG ATRIAL RATE: 53 BPM
EKG DIAGNOSIS: NORMAL
EKG P AXIS: 84 DEGREES
EKG P-R INTERVAL: 154 MS
EKG Q-T INTERVAL: 458 MS
EKG QRS DURATION: 72 MS
EKG QTC CALCULATION (BAZETT): 429 MS
EKG R AXIS: 57 DEGREES
EKG T AXIS: -73 DEGREES
EKG VENTRICULAR RATE: 53 BPM
EOSINOPHIL # BLD: 0.03 K/UL (ref 0–0.4)
EOSINOPHIL NFR BLD: 0.5 % (ref 0–7)
ERYTHROCYTE [DISTWIDTH] IN BLOOD BY AUTOMATED COUNT: 12.6 % (ref 11.5–14.5)
GLOBULIN SER CALC-MCNC: 3.7 G/DL (ref 2–4)
GLUCOSE BLD STRIP.AUTO-MCNC: 111 MG/DL (ref 65–117)
GLUCOSE SERPL-MCNC: 108 MG/DL (ref 65–100)
HCT VFR BLD AUTO: 39.7 % (ref 35–47)
HGB BLD-MCNC: 13.1 G/DL (ref 11.5–16)
IMM GRANULOCYTES # BLD AUTO: 0.01 K/UL (ref 0–0.04)
IMM GRANULOCYTES NFR BLD AUTO: 0.2 % (ref 0–0.5)
LYMPHOCYTES # BLD: 1.15 K/UL (ref 0.8–3.5)
LYMPHOCYTES NFR BLD: 19.9 % (ref 12–49)
MAGNESIUM SERPL-MCNC: 1.6 MG/DL (ref 1.6–2.4)
MCH RBC QN AUTO: 28.8 PG (ref 26–34)
MCHC RBC AUTO-ENTMCNC: 33 G/DL (ref 30–36.5)
MCV RBC AUTO: 87.3 FL (ref 80–99)
MONOCYTES # BLD: 0.47 K/UL (ref 0–1)
MONOCYTES NFR BLD: 8.1 % (ref 5–13)
NEUTS SEG # BLD: 4.08 K/UL (ref 1.8–8)
NEUTS SEG NFR BLD: 70.6 % (ref 32–75)
NRBC # BLD: 0 K/UL (ref 0–0.01)
NRBC BLD-RTO: 0 PER 100 WBC
PLATELET # BLD AUTO: 242 K/UL (ref 150–400)
PMV BLD AUTO: 9.3 FL (ref 8.9–12.9)
POTASSIUM SERPL-SCNC: 3.5 MMOL/L (ref 3.5–5.1)
PROT SERPL-MCNC: 7.5 G/DL (ref 6.4–8.2)
RBC # BLD AUTO: 4.55 M/UL (ref 3.8–5.2)
SERVICE CMNT-IMP: NORMAL
SODIUM SERPL-SCNC: 137 MMOL/L (ref 136–145)
TROPONIN I SERPL HS-MCNC: 6 NG/L (ref 0–51)
WBC # BLD AUTO: 5.8 K/UL (ref 3.6–11)

## 2025-07-13 PROCEDURE — 36415 COLL VENOUS BLD VENIPUNCTURE: CPT

## 2025-07-13 PROCEDURE — 96360 HYDRATION IV INFUSION INIT: CPT

## 2025-07-13 PROCEDURE — 85025 COMPLETE CBC W/AUTO DIFF WBC: CPT

## 2025-07-13 PROCEDURE — 82962 GLUCOSE BLOOD TEST: CPT

## 2025-07-13 PROCEDURE — 2580000003 HC RX 258: Performed by: STUDENT IN AN ORGANIZED HEALTH CARE EDUCATION/TRAINING PROGRAM

## 2025-07-13 PROCEDURE — 99285 EMERGENCY DEPT VISIT HI MDM: CPT

## 2025-07-13 PROCEDURE — 84484 ASSAY OF TROPONIN QUANT: CPT

## 2025-07-13 PROCEDURE — 83735 ASSAY OF MAGNESIUM: CPT

## 2025-07-13 PROCEDURE — 71046 X-RAY EXAM CHEST 2 VIEWS: CPT

## 2025-07-13 PROCEDURE — 93005 ELECTROCARDIOGRAM TRACING: CPT

## 2025-07-13 PROCEDURE — 80053 COMPREHEN METABOLIC PANEL: CPT

## 2025-07-13 RX ORDER — 0.9 % SODIUM CHLORIDE 0.9 %
1000 INTRAVENOUS SOLUTION INTRAVENOUS ONCE
Status: COMPLETED | OUTPATIENT
Start: 2025-07-13 | End: 2025-07-13

## 2025-07-13 RX ADMIN — SODIUM CHLORIDE 1000 ML: 9 INJECTION, SOLUTION INTRAVENOUS at 10:31

## 2025-07-13 NOTE — ED PROVIDER NOTES
[facility] EMERGENCY DEPARTMENT  EMERGENCY DEPARTMENT ENCOUNTER       Pt Name: Dominique Oliva  MRN: [id number]  Birthdate 1960  Date of evaluation: 7/13/2025  Provider: Lukasz Erwin DO   PCP: No primary care provider on file.  Note Started: 10:21 AM EDT 7/13/25  Attending Physician: Dr. Mcneal    CHIEF COMPLAINT       Chief Complaint   Patient presents with    Fatigue     Pt ambulatory to room c/o generalized weakness that this morning around 8am. Pt reports \" I felt like a flush feeling all over\". Pt denies any dizziness but does report lightheadedness.         HISTORY OF PRESENT ILLNESS: 1 or more elements      History From: patient, History limited by: none     Dominique Oliva is a 64 y.o. female with history of vertigo, acid reflux, glaucoma, hypertension, and TIA (15 years ago) who presents to the ED with generalized weakness.  Patient reports that around 8 AM this morning when she was praying she felt weak throughout with nausea and lightheadedness.  She states it lasted roughly 7 minutes.  She also reports of midsternal/epigastric chest discomfort since then but reports that it feels similar to her GERD like pain.  She denies any dizziness, fever/chills, back pain, fall or injury, vision changes, headache, numbness or tingling in extremities, neck pain.  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       Please See MDM for Additional Details of the HPI/PMH  Nursing Notes were all reviewed and agreed with or any disagreements were addressed in the HPI.     REVIEW OF SYSTEMS        Positives and Pertinent negatives as per HPI.    PAST HISTORY     Past Medical History:  Past Medical History:   Diagnosis Date    Acid reflux     Glaucoma     Hyperlipidemia     Hypertension     Vertigo        Past Surgical History:  Past Surgical History:   Procedure Laterality Date    US I&D BREAST ABSCESS DEEP  11/17/2023
